# Patient Record
Sex: FEMALE | Race: WHITE | NOT HISPANIC OR LATINO | Employment: OTHER | URBAN - METROPOLITAN AREA
[De-identification: names, ages, dates, MRNs, and addresses within clinical notes are randomized per-mention and may not be internally consistent; named-entity substitution may affect disease eponyms.]

---

## 2020-11-04 ENCOUNTER — HOSPITAL ENCOUNTER (INPATIENT)
Facility: HOSPITAL | Age: 70
LOS: 3 days | Discharge: HOME | End: 2020-11-07
Attending: INTERNAL MEDICINE | Admitting: SURGERY

## 2020-11-04 DIAGNOSIS — K85.10 ACUTE BILIARY PANCREATITIS WITHOUT INFECTION OR NECROSIS: Primary | ICD-10-CM

## 2020-11-04 DIAGNOSIS — Z00.6 EXAMINATION FOR NORMAL COMPARISON OR CONTROL IN CLINICAL RESEARCH: ICD-10-CM

## 2020-11-04 PROBLEM — E66.811 CLASS 1 OBESITY DUE TO EXCESS CALORIES IN ADULT: Chronic | Status: ACTIVE | Noted: 2020-11-04

## 2020-11-04 PROBLEM — I10 HYPERTENSION: Status: ACTIVE | Noted: 2020-11-04

## 2020-11-04 PROBLEM — E66.09 CLASS 1 OBESITY DUE TO EXCESS CALORIES IN ADULT: Chronic | Status: ACTIVE | Noted: 2020-11-04

## 2020-11-04 PROBLEM — K80.43 CALCULUS OF BILE DUCT WITH ACUTE CHOLECYSTITIS AND OBSTRUCTION: Status: ACTIVE | Noted: 2020-11-04

## 2020-11-04 PROBLEM — E78.5 HYPERLIPIDEMIA: Status: ACTIVE | Noted: 2020-11-04

## 2020-11-04 PROBLEM — I73.9 PAD (PERIPHERAL ARTERY DISEASE): Chronic | Status: ACTIVE | Noted: 2020-11-04

## 2020-11-04 PROCEDURE — 25010000002 HYDROMORPHONE PER 4 MG: Performed by: PHYSICIAN ASSISTANT

## 2020-11-04 PROCEDURE — 99223 1ST HOSP IP/OBS HIGH 75: CPT | Performed by: PHYSICIAN ASSISTANT

## 2020-11-04 RX ORDER — SODIUM CHLORIDE 0.9 % (FLUSH) 0.9 %
10 SYRINGE (ML) INJECTION AS NEEDED
Status: DISCONTINUED | OUTPATIENT
Start: 2020-11-04 | End: 2020-11-07 | Stop reason: HOSPADM

## 2020-11-04 RX ORDER — SODIUM CHLORIDE 0.9 % (FLUSH) 0.9 %
10 SYRINGE (ML) INJECTION EVERY 12 HOURS SCHEDULED
Status: DISCONTINUED | OUTPATIENT
Start: 2020-11-05 | End: 2020-11-07 | Stop reason: HOSPADM

## 2020-11-04 RX ORDER — KETOROLAC TROMETHAMINE 15 MG/ML
15 INJECTION, SOLUTION INTRAMUSCULAR; INTRAVENOUS EVERY 6 HOURS PRN
Status: COMPLETED | OUTPATIENT
Start: 2020-11-04 | End: 2020-11-06

## 2020-11-04 RX ORDER — ONDANSETRON 4 MG/1
4 TABLET, FILM COATED ORAL EVERY 6 HOURS PRN
Status: DISCONTINUED | OUTPATIENT
Start: 2020-11-04 | End: 2020-11-07 | Stop reason: HOSPADM

## 2020-11-04 RX ORDER — HYDROMORPHONE HCL 110MG/55ML
1 PATIENT CONTROLLED ANALGESIA SYRINGE INTRAVENOUS
Status: COMPLETED | OUTPATIENT
Start: 2020-11-04 | End: 2020-11-06

## 2020-11-04 RX ORDER — ALUMINA, MAGNESIA, AND SIMETHICONE 2400; 2400; 240 MG/30ML; MG/30ML; MG/30ML
15 SUSPENSION ORAL EVERY 6 HOURS PRN
Status: DISCONTINUED | OUTPATIENT
Start: 2020-11-04 | End: 2020-11-07 | Stop reason: HOSPADM

## 2020-11-04 RX ORDER — SODIUM CHLORIDE 9 MG/ML
100 INJECTION, SOLUTION INTRAVENOUS CONTINUOUS
Status: DISCONTINUED | OUTPATIENT
Start: 2020-11-05 | End: 2020-11-05

## 2020-11-04 RX ORDER — HYDROCODONE BITARTRATE AND ACETAMINOPHEN 10; 325 MG/1; MG/1
1 TABLET ORAL EVERY 4 HOURS PRN
COMMUNITY

## 2020-11-04 RX ORDER — FENOFIBRATE 145 MG/1
160 TABLET, COATED ORAL DAILY
COMMUNITY

## 2020-11-04 RX ORDER — NICOTINE 21 MG/24HR
1 PATCH, TRANSDERMAL 24 HOURS TRANSDERMAL NIGHTLY
Status: DISCONTINUED | OUTPATIENT
Start: 2020-11-04 | End: 2020-11-07 | Stop reason: HOSPADM

## 2020-11-04 RX ORDER — OMEPRAZOLE 20 MG/1
20 CAPSULE, DELAYED RELEASE ORAL 2 TIMES DAILY
COMMUNITY

## 2020-11-04 RX ORDER — ACETAMINOPHEN 325 MG/1
650 TABLET ORAL EVERY 4 HOURS PRN
Status: DISCONTINUED | OUTPATIENT
Start: 2020-11-04 | End: 2020-11-07 | Stop reason: HOSPADM

## 2020-11-04 RX ORDER — CHOLECALCIFEROL (VITAMIN D3) 125 MCG
5 CAPSULE ORAL NIGHTLY PRN
Status: DISCONTINUED | OUTPATIENT
Start: 2020-11-04 | End: 2020-11-07 | Stop reason: HOSPADM

## 2020-11-04 RX ORDER — BISACODYL 10 MG
10 SUPPOSITORY, RECTAL RECTAL DAILY PRN
Status: DISCONTINUED | OUTPATIENT
Start: 2020-11-04 | End: 2020-11-07 | Stop reason: HOSPADM

## 2020-11-04 RX ORDER — ASPIRIN 81 MG/1
81 TABLET, CHEWABLE ORAL DAILY
COMMUNITY

## 2020-11-04 RX ORDER — ATENOLOL 25 MG/1
60 TABLET ORAL DAILY
COMMUNITY

## 2020-11-04 RX ORDER — ACETAMINOPHEN 160 MG/5ML
650 SOLUTION ORAL EVERY 4 HOURS PRN
Status: DISCONTINUED | OUTPATIENT
Start: 2020-11-04 | End: 2020-11-07 | Stop reason: HOSPADM

## 2020-11-04 RX ORDER — ONDANSETRON 2 MG/ML
4 INJECTION INTRAMUSCULAR; INTRAVENOUS EVERY 6 HOURS PRN
Status: DISCONTINUED | OUTPATIENT
Start: 2020-11-04 | End: 2020-11-07 | Stop reason: HOSPADM

## 2020-11-04 RX ORDER — ACETAMINOPHEN 650 MG/1
650 SUPPOSITORY RECTAL EVERY 4 HOURS PRN
Status: DISCONTINUED | OUTPATIENT
Start: 2020-11-04 | End: 2020-11-07 | Stop reason: HOSPADM

## 2020-11-04 RX ADMIN — Medication 1 PATCH: at 23:36

## 2020-11-04 RX ADMIN — HYDROMORPHONE HYDROCHLORIDE 1 MG: 2 INJECTION, SOLUTION INTRAMUSCULAR; INTRAVENOUS; SUBCUTANEOUS at 23:36

## 2020-11-04 RX ADMIN — SODIUM CHLORIDE 100 ML/HR: 9 INJECTION, SOLUTION INTRAVENOUS at 23:36

## 2020-11-05 ENCOUNTER — APPOINTMENT (OUTPATIENT)
Dept: MRI IMAGING | Facility: HOSPITAL | Age: 70
End: 2020-11-05

## 2020-11-05 ENCOUNTER — ANESTHESIA EVENT (OUTPATIENT)
Dept: PERIOP | Facility: HOSPITAL | Age: 70
End: 2020-11-05

## 2020-11-05 ENCOUNTER — INPATIENT HOSPITAL (AMBULATORY)
Dept: URBAN - METROPOLITAN AREA HOSPITAL 84 | Facility: HOSPITAL | Age: 70
End: 2020-11-05

## 2020-11-05 ENCOUNTER — APPOINTMENT (OUTPATIENT)
Dept: GENERAL RADIOLOGY | Facility: HOSPITAL | Age: 70
End: 2020-11-05

## 2020-11-05 ENCOUNTER — APPOINTMENT (OUTPATIENT)
Dept: OTHER | Facility: HOSPITAL | Age: 70
End: 2020-11-05

## 2020-11-05 DIAGNOSIS — K85.10 BILIARY ACUTE PANCREATITIS WITHOUT NECROSIS OR INFECTION: ICD-10-CM

## 2020-11-05 DIAGNOSIS — R93.2 ABNORMAL FINDINGS ON DIAGNOSTIC IMAGING OF LIVER AND BILIARY: ICD-10-CM

## 2020-11-05 DIAGNOSIS — K81.0 ACUTE CHOLECYSTITIS: ICD-10-CM

## 2020-11-05 DIAGNOSIS — R94.5 ABNORMAL RESULTS OF LIVER FUNCTION STUDIES: ICD-10-CM

## 2020-11-05 DIAGNOSIS — R10.11 RIGHT UPPER QUADRANT PAIN: ICD-10-CM

## 2020-11-05 DIAGNOSIS — R10.13 EPIGASTRIC PAIN: ICD-10-CM

## 2020-11-05 LAB
ALBUMIN SERPL-MCNC: 4.1 G/DL (ref 3.5–5.2)
ALBUMIN/GLOB SERPL: 2 G/DL
ALP SERPL-CCNC: 60 U/L (ref 39–117)
ALT SERPL W P-5'-P-CCNC: 82 U/L (ref 1–33)
ANION GAP SERPL CALCULATED.3IONS-SCNC: 7 MMOL/L (ref 5–15)
AST SERPL-CCNC: 124 U/L (ref 1–32)
BASOPHILS # BLD AUTO: 0.1 10*3/MM3 (ref 0–0.2)
BASOPHILS NFR BLD AUTO: 0.5 % (ref 0–1.5)
BILIRUB SERPL-MCNC: 0.6 MG/DL (ref 0–1.2)
BUN SERPL-MCNC: 17 MG/DL (ref 8–23)
BUN/CREAT SERPL: 23.9 (ref 7–25)
CALCIUM SPEC-SCNC: 9.7 MG/DL (ref 8.6–10.5)
CHLORIDE SERPL-SCNC: 105 MMOL/L (ref 98–107)
CHOLEST SERPL-MCNC: 175 MG/DL (ref 0–200)
CO2 SERPL-SCNC: 26 MMOL/L (ref 22–29)
CREAT SERPL-MCNC: 0.71 MG/DL (ref 0.57–1)
DEPRECATED RDW RBC AUTO: 45.9 FL (ref 37–54)
EOSINOPHIL # BLD AUTO: 0 10*3/MM3 (ref 0–0.4)
EOSINOPHIL NFR BLD AUTO: 0.2 % (ref 0.3–6.2)
ERYTHROCYTE [DISTWIDTH] IN BLOOD BY AUTOMATED COUNT: 13.5 % (ref 12.3–15.4)
GFR SERPL CREATININE-BSD FRML MDRD: 81 ML/MIN/1.73
GLOBULIN UR ELPH-MCNC: 2.1 GM/DL
GLUCOSE SERPL-MCNC: 112 MG/DL (ref 65–99)
HCT VFR BLD AUTO: 35.3 % (ref 34–46.6)
HDLC SERPL-MCNC: 37 MG/DL (ref 40–60)
HGB BLD-MCNC: 12.1 G/DL (ref 12–15.9)
LDLC SERPL CALC-MCNC: 111 MG/DL (ref 0–100)
LDLC/HDLC SERPL: 2.9 {RATIO}
LIPASE SERPL-CCNC: 523 U/L (ref 13–60)
LYMPHOCYTES # BLD AUTO: 1.8 10*3/MM3 (ref 0.7–3.1)
LYMPHOCYTES NFR BLD AUTO: 17.5 % (ref 19.6–45.3)
MAGNESIUM SERPL-MCNC: 1.6 MG/DL (ref 1.6–2.4)
MCH RBC QN AUTO: 33 PG (ref 26.6–33)
MCHC RBC AUTO-ENTMCNC: 34.3 G/DL (ref 31.5–35.7)
MCV RBC AUTO: 96.2 FL (ref 79–97)
MONOCYTES # BLD AUTO: 0.7 10*3/MM3 (ref 0.1–0.9)
MONOCYTES NFR BLD AUTO: 6.7 % (ref 5–12)
NEUTROPHILS NFR BLD AUTO: 7.8 10*3/MM3 (ref 1.7–7)
NEUTROPHILS NFR BLD AUTO: 75.1 % (ref 42.7–76)
NRBC BLD AUTO-RTO: 0 /100 WBC (ref 0–0.2)
PHOSPHATE SERPL-MCNC: 3.7 MG/DL (ref 2.5–4.5)
PLATELET # BLD AUTO: 196 10*3/MM3 (ref 140–450)
PMV BLD AUTO: 8.8 FL (ref 6–12)
POTASSIUM SERPL-SCNC: 4.3 MMOL/L (ref 3.5–5.2)
PROT SERPL-MCNC: 6.2 G/DL (ref 6–8.5)
RBC # BLD AUTO: 3.67 10*6/MM3 (ref 3.77–5.28)
SARS-COV-2 RNA PNL SPEC NAA+PROBE: NOT DETECTED
SODIUM SERPL-SCNC: 138 MMOL/L (ref 136–145)
TRIGL SERPL-MCNC: 153 MG/DL (ref 0–150)
VLDLC SERPL-MCNC: 27 MG/DL (ref 5–40)
WBC # BLD AUTO: 10.4 10*3/MM3 (ref 3.4–10.8)

## 2020-11-05 PROCEDURE — 85025 COMPLETE CBC W/AUTO DIFF WBC: CPT | Performed by: PHYSICIAN ASSISTANT

## 2020-11-05 PROCEDURE — 93010 ELECTROCARDIOGRAM REPORT: CPT | Performed by: INTERNAL MEDICINE

## 2020-11-05 PROCEDURE — 83735 ASSAY OF MAGNESIUM: CPT | Performed by: PHYSICIAN ASSISTANT

## 2020-11-05 PROCEDURE — 83690 ASSAY OF LIPASE: CPT | Performed by: PHYSICIAN ASSISTANT

## 2020-11-05 PROCEDURE — 99232 SBSQ HOSP IP/OBS MODERATE 35: CPT | Performed by: INTERNAL MEDICINE

## 2020-11-05 PROCEDURE — 25010000002 LEVOFLOXACIN PER 250 MG: Performed by: NURSE PRACTITIONER

## 2020-11-05 PROCEDURE — 99222 1ST HOSP IP/OBS MODERATE 55: CPT | Performed by: NURSE PRACTITIONER

## 2020-11-05 PROCEDURE — 80061 LIPID PANEL: CPT | Performed by: PHYSICIAN ASSISTANT

## 2020-11-05 PROCEDURE — 87635 SARS-COV-2 COVID-19 AMP PRB: CPT | Performed by: SURGERY

## 2020-11-05 PROCEDURE — 93005 ELECTROCARDIOGRAM TRACING: CPT | Performed by: SURGERY

## 2020-11-05 PROCEDURE — 74181 MRI ABDOMEN W/O CONTRAST: CPT

## 2020-11-05 PROCEDURE — 71046 X-RAY EXAM CHEST 2 VIEWS: CPT

## 2020-11-05 PROCEDURE — 80053 COMPREHEN METABOLIC PANEL: CPT | Performed by: PHYSICIAN ASSISTANT

## 2020-11-05 PROCEDURE — 99222 1ST HOSP IP/OBS MODERATE 55: CPT | Performed by: SURGERY

## 2020-11-05 PROCEDURE — 25010000002 HYDROMORPHONE PER 4 MG: Performed by: PHYSICIAN ASSISTANT

## 2020-11-05 PROCEDURE — 25010000002 KETOROLAC TROMETHAMINE PER 15 MG: Performed by: PHYSICIAN ASSISTANT

## 2020-11-05 PROCEDURE — 84100 ASSAY OF PHOSPHORUS: CPT | Performed by: PHYSICIAN ASSISTANT

## 2020-11-05 RX ORDER — CYCLOBENZAPRINE HCL 10 MG
10 TABLET ORAL 3 TIMES DAILY PRN
COMMUNITY
End: 2020-11-07 | Stop reason: HOSPADM

## 2020-11-05 RX ORDER — LEVOFLOXACIN 5 MG/ML
750 INJECTION, SOLUTION INTRAVENOUS EVERY 24 HOURS
Status: DISCONTINUED | OUTPATIENT
Start: 2020-11-05 | End: 2020-11-07 | Stop reason: HOSPADM

## 2020-11-05 RX ORDER — SIMVASTATIN 40 MG
40 TABLET ORAL NIGHTLY
COMMUNITY

## 2020-11-05 RX ORDER — SODIUM CHLORIDE, SODIUM LACTATE, POTASSIUM CHLORIDE, CALCIUM CHLORIDE 600; 310; 30; 20 MG/100ML; MG/100ML; MG/100ML; MG/100ML
125 INJECTION, SOLUTION INTRAVENOUS CONTINUOUS
Status: DISCONTINUED | OUTPATIENT
Start: 2020-11-05 | End: 2020-11-07 | Stop reason: HOSPADM

## 2020-11-05 RX ORDER — CLINDAMYCIN PHOSPHATE 900 MG/50ML
900 INJECTION, SOLUTION INTRAVENOUS ONCE
Status: CANCELLED | OUTPATIENT
Start: 2020-11-05 | End: 2020-11-05

## 2020-11-05 RX ORDER — PANTOPRAZOLE SODIUM 40 MG/10ML
40 INJECTION, POWDER, LYOPHILIZED, FOR SOLUTION INTRAVENOUS 2 TIMES DAILY
Status: DISCONTINUED | OUTPATIENT
Start: 2020-11-05 | End: 2020-11-07 | Stop reason: HOSPADM

## 2020-11-05 RX ADMIN — HYDROMORPHONE HYDROCHLORIDE 1 MG: 2 INJECTION, SOLUTION INTRAMUSCULAR; INTRAVENOUS; SUBCUTANEOUS at 14:39

## 2020-11-05 RX ADMIN — SODIUM CHLORIDE, POTASSIUM CHLORIDE, SODIUM LACTATE AND CALCIUM CHLORIDE 125 ML/HR: 600; 310; 30; 20 INJECTION, SOLUTION INTRAVENOUS at 20:22

## 2020-11-05 RX ADMIN — HYDROMORPHONE HYDROCHLORIDE 1 MG: 2 INJECTION, SOLUTION INTRAMUSCULAR; INTRAVENOUS; SUBCUTANEOUS at 04:00

## 2020-11-05 RX ADMIN — LEVOFLOXACIN 750 MG: 5 INJECTION, SOLUTION INTRAVENOUS at 17:54

## 2020-11-05 RX ADMIN — KETOROLAC TROMETHAMINE 15 MG: 15 INJECTION, SOLUTION INTRAMUSCULAR; INTRAVENOUS at 02:06

## 2020-11-05 RX ADMIN — KETOROLAC TROMETHAMINE 15 MG: 15 INJECTION, SOLUTION INTRAMUSCULAR; INTRAVENOUS at 09:27

## 2020-11-05 RX ADMIN — PANTOPRAZOLE SODIUM 40 MG: 40 INJECTION, POWDER, FOR SOLUTION INTRAVENOUS at 10:14

## 2020-11-05 RX ADMIN — PANTOPRAZOLE SODIUM 40 MG: 40 INJECTION, POWDER, FOR SOLUTION INTRAVENOUS at 20:21

## 2020-11-05 RX ADMIN — Medication 10 ML: at 02:06

## 2020-11-05 RX ADMIN — Medication 10 ML: at 09:27

## 2020-11-05 RX ADMIN — HYDROMORPHONE HYDROCHLORIDE 1 MG: 2 INJECTION, SOLUTION INTRAMUSCULAR; INTRAVENOUS; SUBCUTANEOUS at 07:15

## 2020-11-05 RX ADMIN — Medication 1 PATCH: at 20:23

## 2020-11-05 RX ADMIN — HYDROMORPHONE HYDROCHLORIDE 1 MG: 2 INJECTION, SOLUTION INTRAMUSCULAR; INTRAVENOUS; SUBCUTANEOUS at 11:35

## 2020-11-05 RX ADMIN — Medication 10 ML: at 20:23

## 2020-11-05 RX ADMIN — HYDROMORPHONE HYDROCHLORIDE 1 MG: 2 INJECTION, SOLUTION INTRAMUSCULAR; INTRAVENOUS; SUBCUTANEOUS at 21:06

## 2020-11-05 RX ADMIN — KETOROLAC TROMETHAMINE 15 MG: 15 INJECTION, SOLUTION INTRAMUSCULAR; INTRAVENOUS at 17:54

## 2020-11-05 RX ADMIN — SODIUM CHLORIDE, POTASSIUM CHLORIDE, SODIUM LACTATE AND CALCIUM CHLORIDE 125 ML/HR: 600; 310; 30; 20 INJECTION, SOLUTION INTRAVENOUS at 10:14

## 2020-11-05 RX ADMIN — METRONIDAZOLE 500 MG: 500 INJECTION, SOLUTION INTRAVENOUS at 20:22

## 2020-11-05 NOTE — PROGRESS NOTES
[facility] Medicine Services Daily Progress Note      Hospitalist Team  LOS 1 days      Patient Care Team:  Christian Salvador MD as PCP - General (Family Medicine)    Patient Location: 4110/1      Subjective   Subjective     Chief Complaint / Subjective  No chief complaint on file.        Brief Synopsis of Hospital Course/HPI  Ms. Chang is a 70 y.o.  presents to [facility] complaining of constant, abdominal pain that woke the patient up on the AM of 11/4/2020 at 0545 without any associated with nausea and decreased appetite. Nothing makes it better and movement makes it worse. Patient reports that the evening before she ate fast food which is abnormal for her. She denies ever having any pain like this before. The pain is diffusely located bit more concentrated in the RUQ and epigastric region. Patient denies any fever, chills, chest pain, SOA, cough, sputum, peripheral edema, dysuria, or recent travel/sick contacts/hospitalizations.      Documentation, labs, and imaging from [facility] ED reviewed and patient noted to have normal renal function, elevated  and ALT 99, Tbili 1.7, elevated WBC 13, significantly elevated lipase 1215 and amylase 814. On CT A/P W patient was noted to have distended and thickened wall gall bladder that is consistent with acute cholecystitis, peripancreatic fat stranding consistent with acute pancreatitis, and biliary ductal dilation and mild distention of distal pancreatic duct consistent with choledocholithiasis.       Date:11/5/20: abd pain          Review of Systems   Constitution: Negative.   HENT: Negative.    Eyes: Negative.    Cardiovascular: Negative.    Respiratory: Negative.    Endocrine: Negative.    Hematologic/Lymphatic: Negative.    Skin: Negative.    Musculoskeletal: Negative.    Gastrointestinal: Positive for abdominal pain.   Genitourinary: Negative.    Neurological: Negative.    Psychiatric/Behavioral: Negative.     "  Allergic/Immunologic: Negative.    All other systems reviewed and are negative.        Objective   Objective      Vital Signs  Temp:  [97.7 °F (36.5 °C)-98.5 °F (36.9 °C)] 98.5 °F (36.9 °C)  Heart Rate:  [71-88] 87  Resp:  [17-20] 18  BP: (115-145)/(53-69) 145/63  Oxygen Therapy  SpO2: 97 %  Pulse Oximetry Type: Intermittent  Device (Oxygen Therapy): room air  Flowsheet Rows      First Filed Value   Admission Height  162.6 cm (64\") Documented at 11/04/2020 2204   Admission Weight  90 kg (198 lb 6.6 oz) Documented at 11/04/2020 2204        Intake & Output (last 3 days)       11/02 0701 - 11/03 0700 11/03 0701 - 11/04 0700 11/04 0701 - 11/05 0700 11/05 0701 - 11/06 0700    P.O.   240 0    I.V. (mL/kg)    1448 (16.1)    Total Intake(mL/kg)   240 (2.7) 1448 (16.1)    Urine (mL/kg/hr)   600 300 (0.3)    Total Output   600 300    Net   -360 +1148                Lines, Drains & Airways    Active LDAs     Name:   Placement date:   Placement time:   Site:   Days:    Peripheral IV 11/05/20 0030 Anterior;Left;Proximal Forearm   11/05/20 0030    Forearm   less than 1                  Physical Exam:    Physical Exam  Vitals signs and nursing note reviewed.   Constitutional:       General: She is not in acute distress.     Appearance: Normal appearance. She is well-developed. She is not toxic-appearing.   HENT:      Head: Normocephalic and atraumatic.      Nose: Nose normal. No congestion or rhinorrhea.      Mouth/Throat:      Mouth: Mucous membranes are moist.      Pharynx: No oropharyngeal exudate.   Eyes:      General:         Right eye: No discharge.      Extraocular Movements: Extraocular movements intact.      Conjunctiva/sclera: Conjunctivae normal.      Pupils: Pupils are equal, round, and reactive to light.   Neck:      Musculoskeletal: Normal range of motion and neck supple. No neck rigidity or muscular tenderness.      Thyroid: No thyromegaly.      Vascular: No carotid bruit or JVD.      Trachea: No tracheal " deviation.   Cardiovascular:      Rate and Rhythm: Normal rate and regular rhythm.      Pulses: Normal pulses.      Heart sounds: Normal heart sounds. No murmur. No friction rub.   Pulmonary:      Effort: Pulmonary effort is normal.      Breath sounds: Normal breath sounds.   Abdominal:      General: Bowel sounds are normal. There is no distension.      Palpations: Abdomen is soft. There is no mass.      Tenderness: There is abdominal tenderness. There is no guarding or rebound.      Hernia: No hernia is present.   Musculoskeletal: Normal range of motion.         General: No swelling, tenderness, deformity or signs of injury.      Right lower leg: No edema.      Left lower leg: No edema.   Lymphadenopathy:      Cervical: No cervical adenopathy.   Skin:     General: Skin is warm and dry.      Coloration: Skin is not jaundiced or pale.      Findings: No bruising, erythema or rash.   Neurological:      General: No focal deficit present.      Mental Status: She is alert and oriented to person, place, and time. Mental status is at baseline.      Cranial Nerves: No cranial nerve deficit.      Sensory: No sensory deficit.      Motor: No weakness or abnormal muscle tone.      Coordination: Coordination normal.   Psychiatric:         Mood and Affect: Mood normal.         Behavior: Behavior normal.         Thought Content: Thought content normal.         Judgment: Judgment normal.               Procedures:    Procedure(s):  CHOLECYSTECTOMY LAPAROSCOPIC INTRAOPERATIVE CHOLANGIOGRAM          Results Review:     I reviewed the patient's new clinical results.      Lab Results (last 24 hours)     Procedure Component Value Units Date/Time    COVID PRE-OP / PRE-PROCEDURE SCREENING ORDER (NO ISOLATION) - Swab, Nasopharynx [[id number]]  (Normal) Collected: 11/05/20 1400    Specimen: Swab from Nasopharynx Updated: 11/05/20 1533    Narrative:      The following orders were created for panel order COVID PRE-OP / PRE-PROCEDURE SCREENING  ORDER (NO ISOLATION) - Swab, Nasopharynx.  Procedure                               Abnormality         Status                     ---------                               -----------         ------                     COVID-19,CEPHEID,COR/MERCEDEZ...[[id number]]  Normal              Final result                 Please view results for these tests on the individual orders.    COVID-19,CEPHEID,COR/MERCEDEZ/PAD IN-HOUSE(OR EMERGENT/ADD-ON),NP SWAB IN TRANSPORT MEDIA 3-4 HR TAT - Swab, Nasopharynx [[id number]]  (Normal) Collected: 11/05/20 1400    Specimen: Swab from Nasopharynx Updated: 11/05/20 1533     COVID19 Not Detected    Narrative:      Fact sheet for providers: https://www.fda.gov/media/309850/download     Fact sheet for patients: https://www.fda.gov/media/700672/download    Lipase [[id number]]  (Abnormal) Collected: 11/05/20 0227    Specimen: Blood Updated: 11/05/20 0348     Lipase 523 U/L     Lipid Panel [[id number]]  (Abnormal) Collected: 11/05/20 0227    Specimen: Blood Updated: 11/05/20 0341     Total Cholesterol 175 mg/dL      Triglycerides 153 mg/dL      HDL Cholesterol 37 mg/dL      LDL Cholesterol  111 mg/dL      VLDL Cholesterol 27 mg/dL      LDL/HDL Ratio 2.90    Narrative:      Cholesterol Reference Ranges  (U.S. Department of Health and Human Services ATP III Classifications)    Desirable          <200 mg/dL  Borderline High    200-239 mg/dL  High Risk          >240 mg/dL      Triglyceride Reference Ranges  (U.S. Department of Health and Human Services ATP III Classifications)    Normal           <150 mg/dL  Borderline High  150-199 mg/dL  High             200-499 mg/dL  Very High        >500 mg/dL    HDL Reference Ranges  (U.S. Department of Health and Human Services ATP III Classifcations)    Low     <40 mg/dl (major risk factor for CHD)  High    >60 mg/dl ('negative' risk factor for CHD)        LDL Reference Ranges  (U.S. Department of Health and Human Services ATP III Classifcations)    Optimal          <100  mg/dL  Near Optimal     100-129 mg/dL  Borderline High  130-159 mg/dL  High             160-189 mg/dL  Very High        >189 mg/dL    Comprehensive Metabolic Panel [[id number]]  (Abnormal) Collected: 11/05/20 0227    Specimen: Blood Updated: 11/05/20 0341     Glucose 112 mg/dL      BUN 17 mg/dL      Creatinine 0.71 mg/dL      Sodium 138 mmol/L      Potassium 4.3 mmol/L      Chloride 105 mmol/L      CO2 26.0 mmol/L      Calcium 9.7 mg/dL      Total Protein 6.2 g/dL      Albumin 4.10 g/dL      ALT (SGPT) 82 U/L      AST (SGOT) 124 U/L      Alkaline Phosphatase 60 U/L      Total Bilirubin 0.6 mg/dL      eGFR Non African Amer 81 mL/min/1.73      Globulin 2.1 gm/dL      A/G Ratio 2.0 g/dL      BUN/Creatinine Ratio 23.9     Anion Gap 7.0 mmol/L     Narrative:      GFR Normal >60  Chronic Kidney Disease <60  Kidney Failure <15      Magnesium [[id number]]  (Normal) Collected: 11/05/20 0227    Specimen: Blood Updated: 11/05/20 0341     Magnesium 1.6 mg/dL     Phosphorus [[id number]]  (Normal) Collected: 11/05/20 0227    Specimen: Blood Updated: 11/05/20 0341     Phosphorus 3.7 mg/dL     CBC & Differential [[id number]]  (Abnormal) Collected: 11/05/20 0227    Specimen: Blood Updated: 11/05/20 0321    Narrative:      The following orders were created for panel order CBC & Differential.  Procedure                               Abnormality         Status                     ---------                               -----------         ------                     CBC Auto Differential[[id number]]        Abnormal            Final result                 Please view results for these tests on the individual orders.    CBC Auto Differential [[id number]]  (Abnormal) Collected: 11/05/20 0227    Specimen: Blood Updated: 11/05/20 0321     WBC 10.40 10*3/mm3      RBC 3.67 10*6/mm3      Hemoglobin 12.1 g/dL      Hematocrit 35.3 %      MCV 96.2 fL      MCH 33.0 pg      MCHC 34.3 g/dL      RDW 13.5 %      RDW-SD 45.9 fl      MPV 8.8 fL      Platelets  196 10*3/mm3      Neutrophil % 75.1 %      Lymphocyte % 17.5 %      Monocyte % 6.7 %      Eosinophil % 0.2 %      Basophil % 0.5 %      Neutrophils, Absolute 7.80 10*3/mm3      Lymphocytes, Absolute 1.80 10*3/mm3      Monocytes, Absolute 0.70 10*3/mm3      Eosinophils, Absolute 0.00 10*3/mm3      Basophils, Absolute 0.10 10*3/mm3      nRBC 0.0 /100 WBC         No results found for: HGBA1C            Lab Results   Component Value Date    LIPASE 523 (H) 11/05/2020     Lab Results   Component Value Date    CHOL 175 11/05/2020    TRIG 153 (H) 11/05/2020    HDL 37 (L) 11/05/2020     (H) 11/05/2020       No results found for: INTRAOP, PREDX, FINALDX, COMDX    Microbiology Results (last 10 days)     Procedure Component Value - Date/Time    COVID PRE-OP / PRE-PROCEDURE SCREENING ORDER (NO ISOLATION) - Swab, Nasopharynx [[id number]]  (Normal) Collected: 11/05/20 1400    Lab Status: Final result Specimen: Swab from Nasopharynx Updated: 11/05/20 1533    Narrative:      The following orders were created for panel order COVID PRE-OP / PRE-PROCEDURE SCREENING ORDER (NO ISOLATION) - Swab, Nasopharynx.  Procedure                               Abnormality         Status                     ---------                               -----------         ------                     COVID-19,CEPHEID,COR/MERCEDEZ...[[id number]]  Normal              Final result                 Please view results for these tests on the individual orders.    COVID-19,CEPHEID,COR/MERCEDEZ/PAD IN-HOUSE(OR EMERGENT/ADD-ON),NP SWAB IN TRANSPORT MEDIA 3-4 HR TAT - Swab, Nasopharynx [[id number]]  (Normal) Collected: 11/05/20 1400    Lab Status: Final result Specimen: Swab from Nasopharynx Updated: 11/05/20 1533     COVID19 Not Detected    Narrative:      Fact sheet for providers: https://www.fda.gov/media/451878/download     Fact sheet for patients: https://www.fda.gov/media/862570/download          ECG/EMG Results (most recent)     Procedure Component Value Units  Date/Time    ECG 12 Lead [[id number]] Collected: 11/05/20 1546     Updated: 11/05/20 1548     QT Interval 402 ms     Narrative:      HEART RATE= 86  bpm  RR Interval= 700  ms  LA Interval= 129  ms  P Horizontal Axis= -20  deg  P Front Axis= 59  deg  QRSD Interval= 93  ms  QT Interval= 402  ms  QRS Axis= 80  deg  T Wave Axis= 42  deg  - BORDERLINE ECG -  Sinus rhythm  Probable left atrial enlargement  Low voltage, precordial leads  Electronically Signed By:   Date and Time of Study: 2020-11-05 15:46:40                    Mri Abdomen Wo Contrast Mrcp    Result Date: 11/5/2020   1. Acute calculus cholecystitis. 2. Fusiform dilation of the common bile duct, and mild dilation of intrahepatic bile ducts. No high-grade biliary stricture or choledocholithiasis is seen. 3. Normal appearance of the pancreatic duct. 4. Inflammatory changes centered within the pancreatic head and the second-third duodenal segments. FINDINGS suggest pancreatitis/duodenitis. These findings could be secondary to gallbladder inflammation, as well. 4. Hepatomegaly with diffuse hepatic steatosis. Presumed left hepatic cyst. 5. Lower lumbar spinal fusion.   Electronically Signed By-Dr. Tavia Domingo MD On:11/5/2020 12:02 PM This report was finalized on [id number] by Dr. Tavia Domingo MD.          Xrays, labs reviewed personally by physician.    Medication Review:   I have reviewed the patient's current medication list      Scheduled Meds  levoFLOXacin, 750 mg, Intravenous, Q24H  metroNIDAZOLE, 500 mg, Intravenous, Q8H  nicotine, 1 patch, Transdermal, Nightly  pantoprazole, 40 mg, Intravenous, BID  sodium chloride, 10 mL, Intravenous, Q12H        Meds Infusions  lactated ringers, 125 mL/hr, Last Rate: 125 mL/hr (11/05/20 1014)        Meds PRN  •  acetaminophen **OR** acetaminophen **OR** acetaminophen  •  aluminum-magnesium hydroxide-simethicone  •  bisacodyl  •  HYDROmorphone  •  ketorolac  •  magnesium hydroxide  •  melatonin  •  ondansetron  **OR** ondansetron  •  sodium chloride        Assessment/Plan   Assessment/Plan     Active Hospital Problems    Diagnosis  POA   • **Calculus of bile duct with acute cholecystitis and obstruction [K80.43]  Yes   • Acute biliary pancreatitis without infection or necrosis [K85.10]  Yes   • PAD (peripheral artery disease) (CMS/HCC) [I73.9]  Unknown   • Hyperlipidemia [E78.5]  Yes   • Hypertension [I10]  Yes   • Class 1 obesity due to excess calories in adult [E66.09]  Unknown   • Tobacco dependence syndrome [F17.200]  Yes      Resolved Hospital Problems   No resolved problems to display.       MEDICAL DECISION MAKING COMPLEXITY BY PROBLEM:   Acute Choledocholithiasis with acute cholecystitis and acute pancreatitis:  - Likely related to age, obesity and poor diet  - IVFs, NPO at midnight, and pain management ordered  - Surgery consulted by transferring ED >We will plan for MRCP to evaluate for choledocholithiasis.  If choledocholithiasis we will have GI see for ERCP.  If there is no evidence of ongoing Rylee toco lithiasis we will wait for her pancreatitis to resolve and will offer her cholecystectomy as there was some suggestion of gallstones on the CT scan.  We will check in daily to see when this can be done possibly tomorrow but if her pain is not better he may have to wait until the weekend.  - Also consulted GI for possible ERCP     HTN: patient is unsure of her home medications and will need to verify with pharmacy in the AM. Holding medications and will monitor BP trends; PRN medications will be ordered if she is uncontrolled     HLD: Same as above. Lipid panel ordered with AM labs.      PAD s/p angioplasty and stent in Right leg: Patient reports that she has complete occlusion in left leg and is suppose to have a peripheral bypass surgery of both legs but she has not scheduled it. Smoking cessation discussed and encouraged. Holding aspirin for possible surgeries.      Obesity: BMI 34.06; lifestyle modifications  discussed and encouraged.     Tobacco use: smoking cessation discussed and encouraged. PRN nicotine patch ordered.      VTE Prophylaxis -   Mechanical Order History:      Ordered        11/04/20 2323  Place Sequential Compression Device  Once         11/04/20 2323  Maintain Sequential Compression Device  Continuous         Signed and Held  SCD (Sequential Compression Device) - Place on Patient in Pre-Op  Once                 Pharmalogical Order History:     None            Code Status -   Code Status and Medical Interventions:   Ordered at: 11/04/20 2323     Code Status:    CPR     Medical Interventions (Level of Support Prior to Arrest):    Full       This patient has been examined wearing appropriate Personal Protective Equipment and discussed with surgery. 11/05/20        Discharge Planning  home          Electronically signed by Zane Burrell MD, 11/05/20, 18:01 EST.  [facility] Hospitalist Team

## 2020-11-05 NOTE — H&P
[facility] Medicine Services      Patient Name: Radha Chang  : 1950  MRN: [id number]  Primary Care Physician: Christian Salvador MD  Date of admission: 2020    Patient Care Team:  Christian Salvador MD as PCP - General (Family Medicine)          Subjective   History Present Illness     Chief Complaint: No chief complaint on file.    Transfer from [facility] ED for abdominal pain    Ms. Chang is a 70 y.o.  presents to [facility] complaining of constant, abdominal pain that woke the patient up on the AM of 2020 at 0545 without any associated with nausea and decreased appetite. Nothing makes it better and movement makes it worse. Patient reports that the evening before she ate fast food which is abnormal for her. She denies ever having any pain like this before. The pain is diffusely located bit more concentrated in the RUQ and epigastric region. Patient denies any fever, chills, chest pain, SOA, cough, sputum, peripheral edema, dysuria, or recent travel/sick contacts/hospitalizations.     Documentation, labs, and imaging from [facility] ED reviewed and patient noted to have normal renal function, elevated  and ALT 99, Tbili 1.7, elevated WBC 13, significantly elevated lipase 1215 and amylase 814. On CT A/P W patient was noted to have distended and thickened wall gall bladder that is consistent with acute cholecystitis, peripancreatic fat stranding consistent with acute pancreatitis, and biliary ductal dilation and mild distention of distal pancreatic duct consistent with choledocholithiasis.          History of Present Illness    Review of Systems   Constitution: Positive for decreased appetite. Negative for fever.   Cardiovascular: Negative for chest pain.   Respiratory: Negative for cough, shortness of breath and sputum production.    Gastrointestinal: Positive for bloating, abdominal pain and nausea. Negative for change in bowel habit,  hematemesis, hematochezia, melena and vomiting.   All other systems reviewed and are negative.          Personal History     Past Medical History:   Past Medical History:   Diagnosis Date   • Arthritis    • Coronary artery disease    • GERD (gastroesophageal reflux disease)    • Hyperlipidemia    • Hypertension    • PAD (peripheral artery disease) (CMS/[facility])        Surgical History:      Past Surgical History:   Procedure Laterality Date   • ANGIOPLASTY Right     right leg stent placed   • APPENDECTOMY     • BACK SURGERY     • CARDIAC CATHETERIZATION     • HYSTERECTOMY     • ROTATOR CUFF REPAIR Right            Family History: family history includes Cancer in her mother; Pancreatitis in her sister. Otherwise pertinent FHx was reviewed and unremarkable.     Social History:  reports that she has been smoking. She has been smoking about 1.00 pack per day. She has never used smokeless tobacco. She reports previous alcohol use. She reports that she does not use drugs.      Medications:  Prior to Admission medications    Medication Sig Start Date End Date Taking? Authorizing Provider   aspirin 81 MG chewable tablet Chew 81 mg Daily.   Yes Dayami Ortega MD   atenolol (TENORMIN) 25 MG tablet Take 20 mg by mouth Daily.   Yes Dayami Ortega MD   fenofibrate (TRICOR) 145 MG tablet Take 145 mg by mouth Daily.   Yes Dayami Ortega MD   HYDROcodone-acetaminophen (NORCO)  MG per tablet Take 1 tablet by mouth Every 4 (Four) Hours As Needed for Moderate Pain .   Yes Dayami Ortega MD   omeprazole (priLOSEC) 20 MG capsule Take 20 mg by mouth 2 (two) times a day.   Yes Dayami Ortega MD       Allergies:    Allergies   Allergen Reactions   • Niacin Itching   • Kefzol [Cefazolin] Itching       Objective   Objective     Vital Signs  Temp:  [97.8 °F (36.6 °C)] 97.8 °F (36.6 °C)  Heart Rate:  [78] 78  Resp:  [18] 18  BP: (123)/(53) 123/53  SpO2:  [99 %] 99 %  on   ;   Device (Oxygen Therapy): room  air  Body mass index is 34.06 kg/m².    Physical Exam  Vitals signs reviewed.   Constitutional:       General: She is not in acute distress.     Appearance: Normal appearance. She is obese. She is not ill-appearing, toxic-appearing or diaphoretic.      Comments: Appears older than stated age; appears uncomfortable   HENT:      Head: Normocephalic and atraumatic.      Right Ear: External ear normal.      Left Ear: External ear normal.      Nose: Nose normal.      Mouth/Throat:      Mouth: Mucous membranes are dry.   Eyes:      General: No scleral icterus.        Right eye: No discharge.         Left eye: No discharge.      Extraocular Movements: Extraocular movements intact.      Conjunctiva/sclera: Conjunctivae normal.   Neck:      Musculoskeletal: Neck supple.   Cardiovascular:      Rate and Rhythm: Normal rate and regular rhythm.      Pulses: Normal pulses.      Heart sounds: Normal heart sounds. No murmur. No gallop.    Pulmonary:      Effort: Pulmonary effort is normal. No respiratory distress.      Breath sounds: Normal breath sounds. No wheezing or rales.   Abdominal:      General: There is distension.      Palpations: Abdomen is soft.      Tenderness: There is abdominal tenderness. There is guarding.      Comments: Hypoactive bowel sounds   Musculoskeletal: Normal range of motion.   Skin:     General: Skin is dry.   Neurological:      General: No focal deficit present.      Mental Status: She is alert and oriented to person, place, and time.   Psychiatric:         Mood and Affect: Mood normal.         Behavior: Behavior normal.         Thought Content: Thought content normal.         Judgment: Judgment normal.         Results Review:  I have personally reviewed most recent cardiac tracings, lab results, microbiology results, pathology results and radiology images and interpretations and agree with findings, most notably: .              Invalid input(s):  ALKPHOS  CrCl cannot be calculated (No successful lab  value found.).  Brief Urine Lab Results     None          Microbiology Results (last 10 days)     ** No results found for the last 240 hours. **          ECG/EMG Results (most recent)     None                    No radiology results for the last 7 days      CrCl cannot be calculated (No successful lab value found.).    Assessment/Plan   Assessment/Plan       Active Hospital Problems    Diagnosis  POA   • **Calculus of bile duct with acute cholecystitis and obstruction [K80.43]  Yes     Priority: High   • Acute biliary pancreatitis without infection or necrosis [K85.10]  Yes     Priority: High   • PAD (peripheral artery disease) (CMS/HCC) [I73.9]  Unknown   • Hyperlipidemia [E78.5]  Yes   • Hypertension [I10]  Yes   • Class 1 obesity due to excess calories in adult [E66.09]  Unknown   • Tobacco dependence syndrome [F17.200]  Yes      Resolved Hospital Problems   No resolved problems to display.       Acute Choledocholithiasis with acute cholecystitis and acute pancreatitis:  - Likely related to age, obesity and poor diet  - IVFs, NPO at midnight, and pain management ordered  - Surgery consulted by transferring ED and continued here  - Also consulted GI for possible ERCP    HTN: patient is unsure of her home medications and will need to verify with pharmacy in the AM. Holding medications and will monitor BP trends; PRN medications will be ordered if she is uncontrolled    HLD: Same as above. Lipid panel ordered with AM labs.     PAD s/p angioplasty and stent in Right leg: Patient reports that she has complete occlusion in left leg and is suppose to have a peripheral bypass surgery of both legs but she has not scheduled it. Smoking cessation discussed and encouraged. Holding aspirin for possible surgeries.     Obesity: BMI 34.06; lifestyle modifications discussed and encouraged.    Tobacco use: smoking cessation discussed and encouraged. PRN nicotine patch ordered.             VTE Prophylaxis -   Mechanical Order  History:     SCDs      Pharmalogical Order History:     None          CODE STATUS:    Code Status and Medical Interventions:   Ordered at: 11/04/20 2323     Code Status:    CPR     Medical Interventions (Level of Support Prior to Arrest):    Full       This patient has been examined wearing appropriate Personal Protective Equipment and discussed with accepting provider. 11/04/20      I discussed the patient's findings and my recommendations with patient.      Signature:Electronically signed by Era Steiner PA-C, 11/05/20, 12:03 AM PEDRO.      [facility] Hospitalist Team

## 2020-11-05 NOTE — PLAN OF CARE
Goal Outcome Evaluation:  Plan of Care Reviewed With: patient      Patient is very painful and requires IV dilaudid and toradol which patient states has helped her pain some. Patient to be seen by GI and general surgeon today for ERCP and cholecystectomy

## 2020-11-05 NOTE — CONSULTS
Inpatient General Surgery Consult  Consult performed by: Dipesh Rodrigues MD  Consult ordered by: Era Steiner PA-C  Reason for consult: pancreatitis, possible cholecystitis          General Surgery Consult Note      Subjective     70-year-old lady who was transferred from [facility] with a chief complaint of 24 hours of sharp progressive constant central abdominal pain.  She never had this before.  Because of the progression she decided to seek medical attention.  She denies any fevers or chills nausea vomiting or change in her bowel function.  She denies any recent pain with oral intake.  On arrival at the outside hospital she was found to have an elevated lipase bilirubin of 1.7 and CT scan showing some dilated intra and extrahepatic bile ducts dilated gallbladder with possible her pericholecystic fluid and evidence of pancreatitis she was transferred for management of her pancreatitis I been asked to see her for considerations for cholecystectomy if this represents gallstone pancreatitis.    History  Past Medical History:   Diagnosis Date   • Arthritis    • Coronary artery disease    • GERD (gastroesophageal reflux disease)    • Hyperlipidemia    • Hypertension    • PAD (peripheral artery disease) (CMS/HCC)      Past Surgical History:   Procedure Laterality Date   • ANGIOPLASTY Right     right leg stent placed   • APPENDECTOMY     • BACK SURGERY     • CARDIAC CATHETERIZATION     • HYSTERECTOMY     • ROTATOR CUFF REPAIR Right      Family History   Problem Relation Age of Onset   • Cancer Mother         pancreatic   • Pancreatitis Sister      Social History     Tobacco Use   • Smoking status: Current Every Day Smoker     Packs/day: 1.00   • Smokeless tobacco: Never Used   Substance Use Topics   • Alcohol use: Not Currently   • Drug use: Never     Medications Prior to Admission   Medication Sig Dispense Refill Last Dose   • aspirin 81 MG chewable tablet Chew 81 mg Daily.      • atenolol  (TENORMIN) 25 MG tablet Take 60 mg by mouth Daily.      • cyclobenzaprine (FLEXERIL) 10 MG tablet Take 10 mg by mouth 3 (Three) Times a Day As Needed for Muscle Spasms.      • fenofibrate (TRICOR) 145 MG tablet Take 160 mg by mouth Daily.      • HYDROcodone-acetaminophen (NORCO)  MG per tablet Take 1 tablet by mouth Every 4 (Four) Hours As Needed for Moderate Pain .      • omeprazole (priLOSEC) 20 MG capsule Take 20 mg by mouth 2 (two) times a day.      • simvastatin (ZOCOR) 40 MG tablet Take 40 mg by mouth Every Night.        Allergies:  Niacin and Kefzol [cefazolin]    Review of Systems   Constitutional: Negative for chills and fever.   HENT: Negative for sore throat and trouble swallowing.    Eyes: Negative for blurred vision and double vision.   Respiratory: Negative for cough and shortness of breath.    Cardiovascular: Positive for leg swelling. Negative for chest pain.   Gastrointestinal: Positive for abdominal pain and constipation. Negative for abdominal distention, blood in stool, diarrhea, nausea and vomiting.   Genitourinary: Negative for dysuria and hematuria.   Skin: Negative for rash and bruise.   Neurological: Negative for dizziness and confusion.   Psychiatric/Behavioral: Negative for agitation and depressed mood.        Objective     Vital Signs  Temp:  [97.7 °F (36.5 °C)-98.4 °F (36.9 °C)] 98.4 °F (36.9 °C)  Heart Rate:  [71-83] 78  Resp:  [18-20] 20  BP: (115-136)/(53-64) 127/53    Physical Exam  Vitals signs reviewed.   Constitutional:       Appearance: Normal appearance. She is well-developed.   HENT:      Head: Normocephalic and atraumatic.   Eyes:      General: No scleral icterus.     Conjunctiva/sclera: Conjunctivae normal.   Neck:      Musculoskeletal: No muscular tenderness.      Trachea: No tracheal deviation.   Cardiovascular:      Rate and Rhythm: Normal rate.      Pulses: Normal pulses.   Pulmonary:      Effort: Pulmonary effort is normal. No respiratory distress.   Abdominal:       General: There is no distension.      Palpations: Abdomen is soft.      Comments: Obese with some focal tenderness to palpation in the epigastrium.  No evidence of peritonitis   Musculoskeletal:         General: No swelling or tenderness.   Lymphadenopathy:      Cervical: No cervical adenopathy.   Skin:     General: Skin is warm and dry.   Neurological:      General: No focal deficit present.      Mental Status: She is alert. Mental status is at baseline.   Psychiatric:         Mood and Affect: Mood normal.         Behavior: Behavior normal.         Results Review:  Lab Results (last 24 hours)     Procedure Component Value Units Date/Time    Lipase [[id number]]  (Abnormal) Collected: 11/05/20 0227    Specimen: Blood Updated: 11/05/20 0348     Lipase 523 U/L     Lipid Panel [[id number]]  (Abnormal) Collected: 11/05/20 0227    Specimen: Blood Updated: 11/05/20 0341     Total Cholesterol 175 mg/dL      Triglycerides 153 mg/dL      HDL Cholesterol 37 mg/dL      LDL Cholesterol  111 mg/dL      VLDL Cholesterol 27 mg/dL      LDL/HDL Ratio 2.90    Narrative:      Cholesterol Reference Ranges  (U.S. Department of Health and Human Services ATP III Classifications)    Desirable          <200 mg/dL  Borderline High    200-239 mg/dL  High Risk          >240 mg/dL      Triglyceride Reference Ranges  (U.S. Department of Health and Human Services ATP III Classifications)    Normal           <150 mg/dL  Borderline High  150-199 mg/dL  High             200-499 mg/dL  Very High        >500 mg/dL    HDL Reference Ranges  (U.S. Department of Health and Human Services ATP III Classifcations)    Low     <40 mg/dl (major risk factor for CHD)  High    >60 mg/dl ('negative' risk factor for CHD)        LDL Reference Ranges  (U.S. Department of Health and Human Services ATP III Classifcations)    Optimal          <100 mg/dL  Near Optimal     100-129 mg/dL  Borderline High  130-159 mg/dL  High             160-189 mg/dL  Very High        >189  mg/dL    Comprehensive Metabolic Panel [[id number]]  (Abnormal) Collected: 11/05/20 0227    Specimen: Blood Updated: 11/05/20 0341     Glucose 112 mg/dL      BUN 17 mg/dL      Creatinine 0.71 mg/dL      Sodium 138 mmol/L      Potassium 4.3 mmol/L      Chloride 105 mmol/L      CO2 26.0 mmol/L      Calcium 9.7 mg/dL      Total Protein 6.2 g/dL      Albumin 4.10 g/dL      ALT (SGPT) 82 U/L      AST (SGOT) 124 U/L      Alkaline Phosphatase 60 U/L      Total Bilirubin 0.6 mg/dL      eGFR Non African Amer 81 mL/min/1.73      Globulin 2.1 gm/dL      A/G Ratio 2.0 g/dL      BUN/Creatinine Ratio 23.9     Anion Gap 7.0 mmol/L     Narrative:      GFR Normal >60  Chronic Kidney Disease <60  Kidney Failure <15      Magnesium [[id number]]  (Normal) Collected: 11/05/20 0227    Specimen: Blood Updated: 11/05/20 0341     Magnesium 1.6 mg/dL     Phosphorus [[id number]]  (Normal) Collected: 11/05/20 0227    Specimen: Blood Updated: 11/05/20 0341     Phosphorus 3.7 mg/dL     CBC & Differential [[id number]]  (Abnormal) Collected: 11/05/20 0227    Specimen: Blood Updated: 11/05/20 0321    Narrative:      The following orders were created for panel order CBC & Differential.  Procedure                               Abnormality         Status                     ---------                               -----------         ------                     CBC Auto Differential[[id number]]        Abnormal            Final result                 Please view results for these tests on the individual orders.    CBC Auto Differential [[id number]]  (Abnormal) Collected: 11/05/20 0227    Specimen: Blood Updated: 11/05/20 0321     WBC 10.40 10*3/mm3      RBC 3.67 10*6/mm3      Hemoglobin 12.1 g/dL      Hematocrit 35.3 %      MCV 96.2 fL      MCH 33.0 pg      MCHC 34.3 g/dL      RDW 13.5 %      RDW-SD 45.9 fl      MPV 8.8 fL      Platelets 196 10*3/mm3      Neutrophil % 75.1 %      Lymphocyte % 17.5 %      Monocyte % 6.7 %      Eosinophil % 0.2 %      Basophil %  0.5 %      Neutrophils, Absolute 7.80 10*3/mm3      Lymphocytes, Absolute 1.80 10*3/mm3      Monocytes, Absolute 0.70 10*3/mm3      Eosinophils, Absolute 0.00 10*3/mm3      Basophils, Absolute 0.10 10*3/mm3      nRBC 0.0 /100 WBC         Imaging Results (Last 24 Hours)     Procedure Component Value Units Date/Time    CT Outside Films [[id number]] Resulted: 11/05/20 1137     Updated: 11/05/20 1137    Narrative:      This procedure was auto-finalized with no dictation required.    MRI abdomen wo contrast mrcp [[id number]] Resulted: 11/05/20 1157     Updated: 11/05/20 1115          I reviewed the patient's other test results and agree with the interpretation  I reviewed the patient's new imaging results and agree with the interpretation.    Assessment/Plan     Principal Problem:    Calculus of bile duct with acute cholecystitis and obstruction  Active Problems:    Hyperlipidemia    Hypertension    Tobacco dependence syndrome    Acute biliary pancreatitis without infection or necrosis    PAD (peripheral artery disease) (CMS/HCC)    Class 1 obesity due to excess calories in adult    The CT scan was equivocal for common bile duct stones with the elevation of the bilirubin it is possible.  We will plan for MRCP to evaluate for choledocholithiasis.  If choledocholithiasis we will have GI see for ERCP.  If there is no evidence of ongoing Rylee toco lithiasis we will wait for her pancreatitis to resolve and will offer her cholecystectomy as there was some suggestion of gallstones on the CT scan.  We will check in daily to see when this can be done possibly tomorrow but if her pain is not better he may have to wait until the weekend.    This note was created using Dragon Voice Recognition software.    Dipesh Rodrigues MD  11/05/20  11:57 EST

## 2020-11-05 NOTE — CONSULTS
GI CONSULT  NOTE:    Referring Provider:  DARLENE Leal    Chief complaint: Cholecystitis, elevated LFTs    Subjective .     History of present illness: Radha Chang is a 70 y.o. female with past medical history significant for arthritis, CAD, hyperlipidemia, hypertension, PAD, appendectomy, and hysterectomy who presents as a transfer from [facility] with complaints of abdominal pain and elevated LFTs.  The patient reportedly had a CT at the outlying facility that showed distended and thickened gallbladder consistent with cholecystitis, changes of acute pancreatitis, biliary duct dilation and mild distention of the distal pancreatic duct consistent with choledocholithiasis.  LFTs at [facility] were total bilirubin 1.7, , and ALT 99.  The patient was ultimately transferred to [facility] for further evaluation and treatment.  The patient reports that she began having epigastric pain that then radiated throughout her entire abdomen.  The pain is constant and was sharp in nature.  States that her pain has improved since yesterday.  She denies any nausea/vomiting.  Heartburn is typically controlled on omeprazole as an outpatient.  She denies dysphagia.  States that she does typically struggle with some constipation secondary to hydrocodone use as an outpatient.  She will use stool softeners and fiber daily.  Denies any bright red blood per rectum or melena.  No recent fevers.  She denies any previous history of elevated LFTs.  No history of liver disease.      Endo History:  No previous endoscopy.    Past Medical History:  Past Medical History:   Diagnosis Date   • Arthritis    • Coronary artery disease    • GERD (gastroesophageal reflux disease)    • Hyperlipidemia    • Hypertension    • PAD (peripheral artery disease) (CMS/HCC)        Past Surgical History:  Past Surgical History:   Procedure Laterality Date   • ANGIOPLASTY Right     right leg stent placed   •  APPENDECTOMY     • BACK SURGERY     • CARDIAC CATHETERIZATION     • HYSTERECTOMY     • ROTATOR CUFF REPAIR Right        Social History:  Social History     Tobacco Use   • Smoking status: Current Every Day Smoker     Packs/day: 1.00   • Smokeless tobacco: Never Used   Substance Use Topics   • Alcohol use: Not Currently   • Drug use: Never       Family History:  Family History   Problem Relation Age of Onset   • Cancer Mother         pancreatic   • Pancreatitis Sister        Medications:  Medications Prior to Admission   Medication Sig Dispense Refill Last Dose   • aspirin 81 MG chewable tablet Chew 81 mg Daily.      • atenolol (TENORMIN) 25 MG tablet Take 20 mg by mouth Daily.      • fenofibrate (TRICOR) 145 MG tablet Take 145 mg by mouth Daily.      • HYDROcodone-acetaminophen (NORCO)  MG per tablet Take 1 tablet by mouth Every 4 (Four) Hours As Needed for Moderate Pain .      • omeprazole (priLOSEC) 20 MG capsule Take 20 mg by mouth 2 (two) times a day.          Scheduled Meds:nicotine, 1 patch, Transdermal, Nightly  sodium chloride, 10 mL, Intravenous, Q12H      Continuous Infusions:sodium chloride, 100 mL/hr, Last Rate: 100 mL/hr (11/04/20 3871)      PRN Meds:.•  acetaminophen **OR** acetaminophen **OR** acetaminophen  •  aluminum-magnesium hydroxide-simethicone  •  bisacodyl  •  HYDROmorphone  •  ketorolac  •  magnesium hydroxide  •  melatonin  •  ondansetron **OR** ondansetron  •  sodium chloride    ALLERGIES:  Niacin and Kefzol [cefazolin]    ROS:  The following systems were reviewed and negative;   Constitution:  No fevers, chills, no unintentional weight loss  Skin: no rash, no jaundice  Eyes:  No blurry vision, no eye pain  HENT:  No change in hearing or smell  Resp:  No dyspnea or cough  CV:  No chest pain or palpitations  :  No dysuria, hematuria  Musculoskeletal:  No leg cramps or arthralgias  Neuro:  No tremor, no numbness  Psych:  No depression or confusion    Objective     Vital Signs:  "  Vitals:    11/04/20 2204 11/05/20 0102 11/05/20 0433 11/05/20 0816   BP: 123/53 136/64 115/62 127/53   BP Location: Left arm Left arm Left arm Left arm   Patient Position: Sitting Lying Sitting Sitting   Pulse: 78 71 83 78   Resp: 18 20 18 20   Temp: 97.8 °F (36.6 °C) 98.2 °F (36.8 °C) 97.7 °F (36.5 °C) 98.4 °F (36.9 °C)   TempSrc: Oral Oral Oral Oral   SpO2: 99% 96% 95% 94%   Weight: 90 kg (198 lb 6.6 oz)      Height: 162.6 cm (64\")          Physical Exam:       General Appearance:    Awake and alert, in no acute distress, overweight habitus   Head:    Normocephalic, without obvious abnormality, atraumatic   Throat:   No oral lesions, no thrush, oral mucosa moist   Lungs:     Respirations regular, even and unlabored   Chest Wall:    No abnormalities observed   Abdomen:     Soft, epigastric/right upper quadrant tenderness, no rebound or guarding, non-distended   Rectal:     Deferred   Extremities:   Moves all extremities, no edema, no cyanosis   Pulses:   Pulses palpable and equal bilaterally   Skin:   No rash, no jaundice, normal palpation   Lymph nodes:   No cervical, supraclavicular or submandibular palpable adenopathy   Neurologic:   Cranial nerves 2 - 12 grossly intact, no asterixis       Results Review:   I reviewed the patient's labs and imaging.  CBC    Results from last 7 days   Lab Units 11/05/20  0227   WBC 10*3/mm3 10.40   HEMOGLOBIN g/dL 12.1   PLATELETS 10*3/mm3 196     CMP   Results from last 7 days   Lab Units 11/05/20  0227   SODIUM mmol/L 138   POTASSIUM mmol/L 4.3   CHLORIDE mmol/L 105   CO2 mmol/L 26.0   BUN mg/dL 17   CREATININE mg/dL 0.71   GLUCOSE mg/dL 112*   ALBUMIN g/dL 4.10   BILIRUBIN mg/dL 0.6   ALK PHOS U/L 60   AST (SGOT) U/L 124*   ALT (SGPT) U/L 82*   MAGNESIUM mg/dL 1.6   PHOSPHORUS mg/dL 3.7   LIPASE U/L 523*     Cr Clearance Estimated Creatinine Clearance: 70.1 mL/min (by C-G formula based on SCr of 0.71 mg/dL).  Coag     HbA1C No results found for: HGBA1C  Blood Glucose No " results found for: POCGLU  Infection     UA      Radiology(recent) No radiology results for the last day       ASSESSMENT:  -Elevated LFTs -rule out choledocholithiasis  -Gallstone pancreatitis  -Epigastric/RUQ pain  -Abnormal CT showing changes of acute cholecystitis  -Arthritis  -CAD  -Hyperlipidemia  -Hypertension  -PAD  -History of appendectomy  -History of hysterectomy    PLAN:  Patient is a 70-year-old female with history of arthritis, CAD, hyperlipidemia, hypertension, PAD, appendectomy, and hysterectomy who presents as a transfer from [facility] with complaints of abdominal pain and elevated LFTs.  The patient reportedly had a CT at the outlying facility that showed distended and thickened gallbladder consistent with cholecystitis, changes of acute pancreatitis, biliary duct dilation and mild distention of the distal pancreatic duct consistent with choledocholithiasis.  LFTs at [facility] were total bilirubin 1.7, , and ALT 99.    LFTs are trending down.  Continue to monitor.  We will plan MRCP to assess for possible choledocholithiasis.  Consider ERCP pending results.  If MRCP is unremarkable, would recommend proceeding with laparoscopic cholecystectomy with possible IOC.  We will change fluid to LR due to patient's acute pancreatitis.  Maintain n.p.o.  Start PPI twice daily.  Antiemetics/analgesics as needed.  Supportive care.    I discussed the patients findings and my recommendations with the patient.  I will discuss the case with Dr. Dominguez and change plan accordingly.    We appreciate the referral.    Ana Vigil, YURI  11/05/20  09:18 EST

## 2020-11-05 NOTE — PAT
Inpt added to surgery schedule for 11/6/2020 with Dr. Rodrigues, all testing has been ordered, spoke with nurse Dewey.

## 2020-11-05 NOTE — PLAN OF CARE
Goal Outcome Evaluation:  Plan of Care Reviewed With: patient      Patient had MRI this morning and plan has advanced to surgery tomorrow with Dr. Rodrigues. Patient is agreeable to this plan. Patient still having significant pain but states that she feels better than she did on Tuesday and that the pain medicine does help. VSS, will continue to monitor.

## 2020-11-05 NOTE — PROGRESS NOTES
Discharge Planning Assessment   Saurav     Patient Name: Radha Chang  MRN: [id number]  Today's Date: 11/5/2020    Admit Date: 11/4/2020    Discharge Needs Assessment     Row Name 11/05/20 1432       Living Environment    Lives With  spouse    Current Living Arrangements  home/apartment/condo    Potentially Unsafe Housing Conditions  unable to assess    Primary Care Provided by  self    Provides Primary Care For  no one    Family Caregiver if Needed  spouse    Quality of Family Relationships  unable to assess       Resource/Environmental Concerns    Transportation Concerns  car, none       Transition Planning    Patient/Family Anticipates Transition to  home with family    Patient/Family Anticipated Services at Transition  none    Transportation Anticipated  family or friend will provide;car, drives self       Discharge Needs Assessment    Equipment Currently Used at Home  cane, straight    Provided Post Acute Provider List?  N/A    N/A Provider List Comment  patient denies any needs for discharge        Discharge Plan     Row Name 11/05/20 1442       Plan    Plan  anticipate return home    Patient/Family in Agreement with Plan  yes    Plan Comments  spoke to patient in room with ppe(mask and goggles); staying 6 feet away and for less than 15 mins; patient states she is independent at home with spouse and follows with dr bennett;         Patient Forms    Important Message from Medicare (IMM)  Delivered per documentation im given 11/4            Carol naegele rn  Case management  Office number [phone]  Cell phone [phone]

## 2020-11-06 ENCOUNTER — ANESTHESIA (OUTPATIENT)
Dept: PERIOP | Facility: HOSPITAL | Age: 70
End: 2020-11-06

## 2020-11-06 ENCOUNTER — APPOINTMENT (OUTPATIENT)
Dept: GENERAL RADIOLOGY | Facility: HOSPITAL | Age: 70
End: 2020-11-06

## 2020-11-06 LAB
ABO GROUP BLD: NORMAL
ALBUMIN SERPL-MCNC: 3.4 G/DL (ref 3.5–5.2)
ALBUMIN/GLOB SERPL: 1.4 G/DL
ALP SERPL-CCNC: 58 U/L (ref 39–117)
ALT SERPL W P-5'-P-CCNC: 50 U/L (ref 1–33)
AMMONIA BLD-SCNC: 20 UMOL/L (ref 11–51)
ANION GAP SERPL CALCULATED.3IONS-SCNC: 12 MMOL/L (ref 5–15)
AST SERPL-CCNC: 57 U/L (ref 1–32)
BASOPHILS # BLD AUTO: 0.2 10*3/MM3 (ref 0–0.2)
BASOPHILS NFR BLD AUTO: 1.4 % (ref 0–1.5)
BILIRUB SERPL-MCNC: 0.5 MG/DL (ref 0–1.2)
BLD GP AB SCN SERPL QL: NEGATIVE
BUN SERPL-MCNC: 16 MG/DL (ref 8–23)
BUN/CREAT SERPL: 23.9 (ref 7–25)
CALCIUM SPEC-SCNC: 9 MG/DL (ref 8.6–10.5)
CHLORIDE SERPL-SCNC: 103 MMOL/L (ref 98–107)
CO2 SERPL-SCNC: 22 MMOL/L (ref 22–29)
CREAT SERPL-MCNC: 0.67 MG/DL (ref 0.57–1)
CRP SERPL-MCNC: 22.13 MG/DL (ref 0–0.5)
DEPRECATED RDW RBC AUTO: 45.9 FL (ref 37–54)
EOSINOPHIL # BLD AUTO: 0 10*3/MM3 (ref 0–0.4)
EOSINOPHIL NFR BLD AUTO: 0.4 % (ref 0.3–6.2)
ERYTHROCYTE [DISTWIDTH] IN BLOOD BY AUTOMATED COUNT: 13.5 % (ref 12.3–15.4)
GFR SERPL CREATININE-BSD FRML MDRD: 87 ML/MIN/1.73
GLOBULIN UR ELPH-MCNC: 2.5 GM/DL
GLUCOSE SERPL-MCNC: 91 MG/DL (ref 65–99)
HCT VFR BLD AUTO: 32 % (ref 34–46.6)
HGB BLD-MCNC: 10.8 G/DL (ref 12–15.9)
INR PPP: 1.13 (ref 0.93–1.1)
LIPASE SERPL-CCNC: 76 U/L (ref 13–60)
LYMPHOCYTES # BLD AUTO: 1.9 10*3/MM3 (ref 0.7–3.1)
LYMPHOCYTES NFR BLD AUTO: 16 % (ref 19.6–45.3)
MCH RBC QN AUTO: 32.4 PG (ref 26.6–33)
MCHC RBC AUTO-ENTMCNC: 33.6 G/DL (ref 31.5–35.7)
MCV RBC AUTO: 96.6 FL (ref 79–97)
MONOCYTES # BLD AUTO: 0.7 10*3/MM3 (ref 0.1–0.9)
MONOCYTES NFR BLD AUTO: 6.3 % (ref 5–12)
NEUTROPHILS NFR BLD AUTO: 75.9 % (ref 42.7–76)
NEUTROPHILS NFR BLD AUTO: 9 10*3/MM3 (ref 1.7–7)
NRBC BLD AUTO-RTO: 0.1 /100 WBC (ref 0–0.2)
PLATELET # BLD AUTO: 170 10*3/MM3 (ref 140–450)
PMV BLD AUTO: 8.4 FL (ref 6–12)
POTASSIUM SERPL-SCNC: 3.9 MMOL/L (ref 3.5–5.2)
PROT SERPL-MCNC: 5.9 G/DL (ref 6–8.5)
PROTHROMBIN TIME: 12.4 SECONDS (ref 9.6–11.7)
RBC # BLD AUTO: 3.32 10*6/MM3 (ref 3.77–5.28)
RH BLD: POSITIVE
SODIUM SERPL-SCNC: 137 MMOL/L (ref 136–145)
T&S EXPIRATION DATE: NORMAL
WBC # BLD AUTO: 11.8 10*3/MM3 (ref 3.4–10.8)

## 2020-11-06 PROCEDURE — 25010000002 KETOROLAC TROMETHAMINE PER 15 MG: Performed by: PHYSICIAN ASSISTANT

## 2020-11-06 PROCEDURE — 25010000002 HYDROMORPHONE PER 4 MG: Performed by: NURSE ANESTHETIST, CERTIFIED REGISTERED

## 2020-11-06 PROCEDURE — 99232 SBSQ HOSP IP/OBS MODERATE 35: CPT | Performed by: NURSE PRACTITIONER

## 2020-11-06 PROCEDURE — 82140 ASSAY OF AMMONIA: CPT | Performed by: NURSE PRACTITIONER

## 2020-11-06 PROCEDURE — 47563 LAPARO CHOLECYSTECTOMY/GRAPH: CPT | Performed by: SURGERY

## 2020-11-06 PROCEDURE — 74300 X-RAY BILE DUCTS/PANCREAS: CPT

## 2020-11-06 PROCEDURE — 25010000002 PROPOFOL 10 MG/ML EMULSION: Performed by: NURSE ANESTHETIST, CERTIFIED REGISTERED

## 2020-11-06 PROCEDURE — 85610 PROTHROMBIN TIME: CPT | Performed by: NURSE PRACTITIONER

## 2020-11-06 PROCEDURE — 25010000002 FENTANYL CITRATE (PF) 100 MCG/2ML SOLUTION: Performed by: NURSE ANESTHETIST, CERTIFIED REGISTERED

## 2020-11-06 PROCEDURE — 86140 C-REACTIVE PROTEIN: CPT | Performed by: NURSE PRACTITIONER

## 2020-11-06 PROCEDURE — 99232 SBSQ HOSP IP/OBS MODERATE 35: CPT | Performed by: INTERNAL MEDICINE

## 2020-11-06 PROCEDURE — 86900 BLOOD TYPING SEROLOGIC ABO: CPT

## 2020-11-06 PROCEDURE — 80053 COMPREHEN METABOLIC PANEL: CPT | Performed by: PHYSICIAN ASSISTANT

## 2020-11-06 PROCEDURE — 86850 RBC ANTIBODY SCREEN: CPT | Performed by: SURGERY

## 2020-11-06 PROCEDURE — 83690 ASSAY OF LIPASE: CPT | Performed by: NURSE PRACTITIONER

## 2020-11-06 PROCEDURE — 88304 TISSUE EXAM BY PATHOLOGIST: CPT | Performed by: SURGERY

## 2020-11-06 PROCEDURE — 47563 LAPARO CHOLECYSTECTOMY/GRAPH: CPT | Performed by: NURSE PRACTITIONER

## 2020-11-06 PROCEDURE — BF111ZZ FLUOROSCOPY OF BILIARY AND PANCREATIC DUCTS USING LOW OSMOLAR CONTRAST: ICD-10-PCS | Performed by: SURGERY

## 2020-11-06 PROCEDURE — 86901 BLOOD TYPING SEROLOGIC RH(D): CPT

## 2020-11-06 PROCEDURE — 0 IOHEXOL 300 MG/ML SOLUTION: Performed by: SURGERY

## 2020-11-06 PROCEDURE — 25010000002 ONDANSETRON PER 1 MG: Performed by: NURSE ANESTHETIST, CERTIFIED REGISTERED

## 2020-11-06 PROCEDURE — 85025 COMPLETE CBC W/AUTO DIFF WBC: CPT | Performed by: PHYSICIAN ASSISTANT

## 2020-11-06 PROCEDURE — 86900 BLOOD TYPING SEROLOGIC ABO: CPT | Performed by: SURGERY

## 2020-11-06 PROCEDURE — 86901 BLOOD TYPING SEROLOGIC RH(D): CPT | Performed by: SURGERY

## 2020-11-06 PROCEDURE — 25010000002 HYDROMORPHONE PER 4 MG: Performed by: PHYSICIAN ASSISTANT

## 2020-11-06 PROCEDURE — 25010000002 LEVOFLOXACIN PER 250 MG: Performed by: SURGERY

## 2020-11-06 PROCEDURE — 25010000002 HYDROMORPHONE PER 4 MG: Performed by: SURGERY

## 2020-11-06 PROCEDURE — 99233 SBSQ HOSP IP/OBS HIGH 50: CPT | Performed by: SURGERY

## 2020-11-06 PROCEDURE — 0FT44ZZ RESECTION OF GALLBLADDER, PERCUTANEOUS ENDOSCOPIC APPROACH: ICD-10-PCS | Performed by: SURGERY

## 2020-11-06 DEVICE — LIGAMAX 5 MM ENDOSCOPIC MULTIPLE CLIP APPLIER
Type: IMPLANTABLE DEVICE | Site: ABDOMEN | Status: FUNCTIONAL
Brand: LIGAMAX

## 2020-11-06 DEVICE — SEAL HEMO SURG ARISTA/AH ABS/PWDR 3GM: Type: IMPLANTABLE DEVICE | Site: ABDOMEN | Status: FUNCTIONAL

## 2020-11-06 RX ORDER — HYDROMORPHONE HCL 110MG/55ML
0.5 PATIENT CONTROLLED ANALGESIA SYRINGE INTRAVENOUS
Status: DISCONTINUED | OUTPATIENT
Start: 2020-11-06 | End: 2020-11-06 | Stop reason: HOSPADM

## 2020-11-06 RX ORDER — NEOSTIGMINE METHYLSULFATE 5 MG/5 ML
SYRINGE (ML) INTRAVENOUS AS NEEDED
Status: DISCONTINUED | OUTPATIENT
Start: 2020-11-06 | End: 2020-11-06 | Stop reason: SURG

## 2020-11-06 RX ORDER — FENTANYL CITRATE 50 UG/ML
INJECTION, SOLUTION INTRAMUSCULAR; INTRAVENOUS AS NEEDED
Status: DISCONTINUED | OUTPATIENT
Start: 2020-11-06 | End: 2020-11-06 | Stop reason: SURG

## 2020-11-06 RX ORDER — ONDANSETRON 2 MG/ML
INJECTION INTRAMUSCULAR; INTRAVENOUS AS NEEDED
Status: DISCONTINUED | OUTPATIENT
Start: 2020-11-06 | End: 2020-11-06 | Stop reason: SURG

## 2020-11-06 RX ORDER — HYDROCODONE BITARTRATE AND ACETAMINOPHEN 10; 325 MG/1; MG/1
1 TABLET ORAL EVERY 4 HOURS PRN
Status: DISCONTINUED | OUTPATIENT
Start: 2020-11-06 | End: 2020-11-07 | Stop reason: HOSPADM

## 2020-11-06 RX ORDER — HYDROMORPHONE HCL 110MG/55ML
PATIENT CONTROLLED ANALGESIA SYRINGE INTRAVENOUS AS NEEDED
Status: DISCONTINUED | OUTPATIENT
Start: 2020-11-06 | End: 2020-11-06 | Stop reason: SURG

## 2020-11-06 RX ORDER — HYDROCODONE BITARTRATE AND ACETAMINOPHEN 5; 325 MG/1; MG/1
1 TABLET ORAL ONCE
Status: DISCONTINUED | OUTPATIENT
Start: 2020-11-06 | End: 2020-11-06

## 2020-11-06 RX ORDER — GLYCOPYRROLATE 1 MG/5 ML
SYRINGE (ML) INTRAVENOUS AS NEEDED
Status: DISCONTINUED | OUTPATIENT
Start: 2020-11-06 | End: 2020-11-06 | Stop reason: SURG

## 2020-11-06 RX ORDER — LIDOCAINE HYDROCHLORIDE AND EPINEPHRINE 10; 10 MG/ML; UG/ML
INJECTION, SOLUTION INFILTRATION; PERINEURAL AS NEEDED
Status: DISCONTINUED | OUTPATIENT
Start: 2020-11-06 | End: 2020-11-06 | Stop reason: HOSPADM

## 2020-11-06 RX ORDER — ROCURONIUM BROMIDE 10 MG/ML
INJECTION, SOLUTION INTRAVENOUS AS NEEDED
Status: DISCONTINUED | OUTPATIENT
Start: 2020-11-06 | End: 2020-11-06 | Stop reason: SURG

## 2020-11-06 RX ORDER — HYDROCODONE BITARTRATE AND ACETAMINOPHEN 5; 325 MG/1; MG/1
1 TABLET ORAL EVERY 6 HOURS PRN
Status: DISCONTINUED | OUTPATIENT
Start: 2020-11-06 | End: 2020-11-06 | Stop reason: ALTCHOICE

## 2020-11-06 RX ORDER — IBUPROFEN 400 MG/1
600 TABLET ORAL ONCE AS NEEDED
Status: DISCONTINUED | OUTPATIENT
Start: 2020-11-06 | End: 2020-11-06 | Stop reason: HOSPADM

## 2020-11-06 RX ORDER — LIDOCAINE HYDROCHLORIDE 20 MG/ML
INJECTION, SOLUTION EPIDURAL; INFILTRATION; INTRACAUDAL; PERINEURAL AS NEEDED
Status: DISCONTINUED | OUTPATIENT
Start: 2020-11-06 | End: 2020-11-06 | Stop reason: SURG

## 2020-11-06 RX ORDER — PROPOFOL 10 MG/ML
VIAL (ML) INTRAVENOUS AS NEEDED
Status: DISCONTINUED | OUTPATIENT
Start: 2020-11-06 | End: 2020-11-06 | Stop reason: SURG

## 2020-11-06 RX ORDER — PHENYLEPHRINE HCL IN 0.9% NACL 0.5 MG/5ML
SYRINGE (ML) INTRAVENOUS AS NEEDED
Status: DISCONTINUED | OUTPATIENT
Start: 2020-11-06 | End: 2020-11-06 | Stop reason: SURG

## 2020-11-06 RX ORDER — HYDROCODONE BITARTRATE AND ACETAMINOPHEN 10; 325 MG/1; MG/1
1 TABLET ORAL ONCE AS NEEDED
Status: DISCONTINUED | OUTPATIENT
Start: 2020-11-06 | End: 2020-11-06 | Stop reason: HOSPADM

## 2020-11-06 RX ADMIN — HYDROMORPHONE HYDROCHLORIDE 0.5 MG: 2 INJECTION INTRAMUSCULAR; INTRAVENOUS; SUBCUTANEOUS at 11:59

## 2020-11-06 RX ADMIN — KETOROLAC TROMETHAMINE 15 MG: 15 INJECTION, SOLUTION INTRAMUSCULAR; INTRAVENOUS at 05:30

## 2020-11-06 RX ADMIN — METRONIDAZOLE 500 MG: 500 INJECTION, SOLUTION INTRAVENOUS at 18:33

## 2020-11-06 RX ADMIN — LIDOCAINE HYDROCHLORIDE 100 MG: 20 INJECTION, SOLUTION EPIDURAL; INFILTRATION; INTRACAUDAL; PERINEURAL at 11:05

## 2020-11-06 RX ADMIN — PANTOPRAZOLE SODIUM 40 MG: 40 INJECTION, POWDER, FOR SOLUTION INTRAVENOUS at 20:31

## 2020-11-06 RX ADMIN — Medication 4 MG: at 12:04

## 2020-11-06 RX ADMIN — Medication 8 MG: at 12:04

## 2020-11-06 RX ADMIN — SODIUM CHLORIDE, POTASSIUM CHLORIDE, SODIUM LACTATE AND CALCIUM CHLORIDE 125 ML/HR: 600; 310; 30; 20 INJECTION, SOLUTION INTRAVENOUS at 06:33

## 2020-11-06 RX ADMIN — METRONIDAZOLE 500 MG: 500 INJECTION, SOLUTION INTRAVENOUS at 09:45

## 2020-11-06 RX ADMIN — FENTANYL CITRATE 50 MCG: 50 INJECTION, SOLUTION INTRAMUSCULAR; INTRAVENOUS at 10:58

## 2020-11-06 RX ADMIN — HYDROMORPHONE HYDROCHLORIDE 0.5 MG: 2 INJECTION INTRAMUSCULAR; INTRAVENOUS; SUBCUTANEOUS at 11:41

## 2020-11-06 RX ADMIN — HYDROMORPHONE HYDROCHLORIDE 1 MG: 2 INJECTION, SOLUTION INTRAMUSCULAR; INTRAVENOUS; SUBCUTANEOUS at 16:35

## 2020-11-06 RX ADMIN — ONDANSETRON 4 MG: 2 INJECTION INTRAMUSCULAR; INTRAVENOUS at 12:00

## 2020-11-06 RX ADMIN — FENTANYL CITRATE 50 MCG: 50 INJECTION, SOLUTION INTRAMUSCULAR; INTRAVENOUS at 11:35

## 2020-11-06 RX ADMIN — HYDROMORPHONE HYDROCHLORIDE 0.5 MG: 2 INJECTION INTRAMUSCULAR; INTRAVENOUS; SUBCUTANEOUS at 11:47

## 2020-11-06 RX ADMIN — HYDROMORPHONE HYDROCHLORIDE 1 MG: 2 INJECTION, SOLUTION INTRAMUSCULAR; INTRAVENOUS; SUBCUTANEOUS at 07:17

## 2020-11-06 RX ADMIN — PHENYLEPHRINE HYDROCHLORIDE 100 MCG: 10 INJECTION INTRAVENOUS at 11:19

## 2020-11-06 RX ADMIN — METRONIDAZOLE 500 MG: 500 INJECTION, SOLUTION INTRAVENOUS at 01:03

## 2020-11-06 RX ADMIN — HYDROMORPHONE HYDROCHLORIDE 1 MG: 2 INJECTION, SOLUTION INTRAMUSCULAR; INTRAVENOUS; SUBCUTANEOUS at 10:41

## 2020-11-06 RX ADMIN — LEVOFLOXACIN 750 MG: 5 INJECTION, SOLUTION INTRAVENOUS at 16:35

## 2020-11-06 RX ADMIN — HYDROMORPHONE HYDROCHLORIDE 1 MG: 2 INJECTION, SOLUTION INTRAMUSCULAR; INTRAVENOUS; SUBCUTANEOUS at 01:03

## 2020-11-06 RX ADMIN — Medication 10 ML: at 09:46

## 2020-11-06 RX ADMIN — PANTOPRAZOLE SODIUM 40 MG: 40 INJECTION, POWDER, FOR SOLUTION INTRAVENOUS at 09:45

## 2020-11-06 RX ADMIN — HYDROMORPHONE HYDROCHLORIDE 0.5 MG: 2 INJECTION, SOLUTION INTRAMUSCULAR; INTRAVENOUS; SUBCUTANEOUS at 12:52

## 2020-11-06 RX ADMIN — HYDROCODONE BITARTRATE AND ACETAMINOPHEN 1 TABLET: 5; 325 TABLET ORAL at 20:30

## 2020-11-06 RX ADMIN — ACETAMINOPHEN 650 MG: 325 TABLET, FILM COATED ORAL at 02:30

## 2020-11-06 RX ADMIN — HYDROCODONE BITARTRATE AND ACETAMINOPHEN 1 TABLET: 5; 325 TABLET ORAL at 22:37

## 2020-11-06 RX ADMIN — ROCURONIUM BROMIDE 40 MG: 10 INJECTION, SOLUTION INTRAVENOUS at 11:05

## 2020-11-06 RX ADMIN — SODIUM CHLORIDE, POTASSIUM CHLORIDE, SODIUM LACTATE AND CALCIUM CHLORIDE: 600; 310; 30; 20 INJECTION, SOLUTION INTRAVENOUS at 10:54

## 2020-11-06 RX ADMIN — PROPOFOL 150 MG: 10 INJECTION, EMULSION INTRAVENOUS at 11:05

## 2020-11-06 RX ADMIN — Medication 10 ML: at 20:31

## 2020-11-06 NOTE — OP NOTE
Operative Report:    Patient Name:  Radha Chang  YOB: 1950    Date of Surgery:  11/6/2020     Indications: 70-year-old lady who was transferred from [facility] with pancreatitis likely gallstone pancreatitis.  She went underwent MRCP here in the hospital which not show any evidence of common bile duct stones.  As her pancreatitis resolved I counseled her about the risks and benefits of cholecystectomy to prevent recurrent gallstone pancreatitis she understood the risks of the operation and was willing to proceed.    Pre-op Diagnosis:   Acute biliary pancreatitis without infection or necrosis [K85.10]       Post-Op Diagnosis Codes:     * Acute biliary pancreatitis without infection or necrosis [K85.10]    Procedure/CPT® Codes:      Procedure(s):  CHOLECYSTECTOMY LAPAROSCOPIC INTRAOPERATIVE CHOLANGIOGRAM    Staff:  Surgeon(s):  Dipesh Rodrigues MD    Assistant: Phyllis Morgan APRN    Anesthesia: General    Estimated Blood Loss: minimal    Implants:    Implant Name Type Inv. Item Serial No.  Lot No. LRB No. Used Action   CLIPAPPLR M/ ENDO LIGAMAX5 5MM 33CM MD/GUILLERMO - [id number] Implant CLIPAPPLR M/ ENDO LIGAMAX5 5MM 33CM MD/LG  [employer]  DIV OF J AND J [id number] N/A 1 Implanted   SEAL HEMO ABS RANDY AH PWDR 3GM - [id number] Implant SEAL HEMO ABS RANDY AH PWDR 3GM  MEDAFOR HEMOSTATIS POLYMER Medudem [id number] N/A 1 Implanted       Specimen:          Specimens     ID Source Type Tests Collected By Collected At Frozen?      A Gallbladder Tissue · TISSUE PATHOLOGY EXAM   Dipesh Rodrigues MD 11/6/20 1128      Description: gallbladder               Findings: Intraoperative cholangiogram showing filling of the cystic duct, bile duct, hepatic duct right and left hepatic ducts they were dilated but there was no evidence of gallstone within the distal common bile duct there was normal spillage of contrast into the duodenum.    Complications: None    Description of  Procedure: Patient was taken to the operating room placed on the operating table in the supine position under general anesthesia.  Her abdomen was prepped and draped normal sterile fashion.  Timeout was performed identifying the correct patient procedure site of operation.  I began the operation by entering the abdomen through infraumbilical skin incision the fascia was grasped elevated incised the peritoneum was entered bluntly.  The Brody trocar was placed the abdomen insufflated camera introduced there is no evidence of injury to underlying structures.  She was placed reverse Trendelenburg with the right side up.  The epigastric and 2 right subcostal 5 mm ports were placed under laparoscopic guidance.  The gallbladder was grasped retracted towards the right upper quadrant and the infundibulum tracked laterally the base the gallbladder was dissected free from surrounding tissues I was able to identify the cystic duct and cystic artery.  I put clips on the cystic artery prior to the cholangiogram.  The Hummel clamp was placed across the base the gallbladder the catheter was placed in the bile duct and the intraoperative cholangiogram was obtained.  This showed opacification of the cystic duct which then led into the bile duct.  The common bile duct was dilated with there is no evidence of stones downstream there was a smooth taper with spillage of contrast to the duodenum.  The common hepatic duct and right and left hepatic ducts were dilated but were opacified without any evidence of stones.  That they completed the cholangiogram.  The cystic duct was then doubly clipped and divided the cystic artery was divided the gallbladder was dissected off of the liver edge using the Bovie.  There was some bleeding which was stopped using the Bovie.  There were still gallstones which were evacuated with the suction .  The gallbladder was placed in Endo Catch bag and removed.  Her upper quadrant was thoroughly  inspected it was suctioned hemostasis was achieved Gaurav was placed at the surgical dissection planes.  Upon inspection she did have some adhesions in the right lower quadrant from her previous appendectomy there are also some lesions in the left abdomen from the omentum to the abdominal wall.  The remainder of the inspection was normal.  The ports were then removed without any abdominal wall hemorrhage the Brody trocar site was closed with 0 Vicryl suture the skin was closed with 4-0 Vicryl suture and skin affix was placed on the wounds.      Dipesh Rodrigues MD     Date: 11/6/2020  Time: 12:31 EST    This note was created using Dragon Voice Recognition software.

## 2020-11-06 NOTE — ANESTHESIA PROCEDURE NOTES
Peripheral IV    Patient location during procedure: OR  Start time: 11/6/2020 11:10 AM  End time: 11/6/2020 11:14 AM  Performed By   CRNA: Lizeth Dorado CRNA  Preanesthetic Checklist  Completed: patient identified, site marked, surgical consent, pre-op evaluation, timeout performed, IV checked, risks and benefits discussed and monitors and equipment checked  Peripheral IV Prep   Patient position: supine   Prep: ChloraPrep  Peripheral IV Procedure   Laterality:left  Location:  Wrist  Catheter size: 20 G         Post Assessment   Dressing Type: transparent.    IV Dressing/Site: clean, dry and intact

## 2020-11-06 NOTE — PLAN OF CARE
Goal Outcome Evaluation:  Plan of Care Reviewed With: patient     Outcome Summary: Very painful tonight-back pain. Taking prn pain meds through out the night.Expecting potential surgery today and has signed consent. Alert and oriented x4 and understanding of possibility of OR today.

## 2020-11-06 NOTE — PROGRESS NOTES
General Surgery Progress Note     LOS: 2 days   Patient Care Team:  Christian Salvador MD as PCP - General (Family Medicine)    Subjective     Interval History:   Patient has had an interval improvement in her abdominal pain.  Still not having much of an appetite.  She now complains of severe back pain from laying flat for the MRI.  No significant bowel function since arrival.    Objective     Vital Signs  Temp:  [97.6 °F (36.4 °C)-98.5 °F (36.9 °C)] 97.6 °F (36.4 °C)  Heart Rate:  [87-94] 90  Resp:  [17-20] 18  BP: (139-178)/(63-70) 150/67    Physical Exam  Constitutional:       Appearance: Normal appearance.   HENT:      Head: Normocephalic and atraumatic.   Eyes:      General: No scleral icterus.     Conjunctiva/sclera: Conjunctivae normal.   Cardiovascular:      Rate and Rhythm: Normal rate.      Pulses: Normal pulses.   Pulmonary:      Effort: Pulmonary effort is normal. No respiratory distress.   Abdominal:      Comments: Soft and obese mild focal tender to palpation right upper quadrant without peritoneal signs   Musculoskeletal:         General: Swelling present.   Skin:     General: Skin is warm.   Neurological:      General: No focal deficit present.      Mental Status: She is alert. Mental status is at baseline.   Psychiatric:         Mood and Affect: Mood normal.         Behavior: Behavior normal.            Results Review:    Lab Results (last 24 hours)     Procedure Component Value Units Date/Time    Comprehensive Metabolic Panel [[id number]]  (Abnormal) Collected: 11/06/20 0157    Specimen: Blood Updated: 11/06/20 0225     Glucose 91 mg/dL      BUN 16 mg/dL      Creatinine 0.67 mg/dL      Sodium 137 mmol/L      Potassium 3.9 mmol/L      Comment: Slight hemolysis detected by analyzer. Results may be affected.        Chloride 103 mmol/L      CO2 22.0 mmol/L      Calcium 9.0 mg/dL      Total Protein 5.9 g/dL      Albumin 3.40 g/dL      ALT (SGPT) 50 U/L      AST (SGOT) 57 U/L      Alkaline  Phosphatase 58 U/L      Total Bilirubin 0.5 mg/dL      eGFR Non African Amer 87 mL/min/1.73      Globulin 2.5 gm/dL      A/G Ratio 1.4 g/dL      BUN/Creatinine Ratio 23.9     Anion Gap 12.0 mmol/L     Narrative:      GFR Normal >60  Chronic Kidney Disease <60  Kidney Failure <15      Lipase [[id number]]  (Abnormal) Collected: 11/06/20 0157    Specimen: Blood Updated: 11/06/20 0225     Lipase 76 U/L     C-reactive Protein [[id number]]  (Abnormal) Collected: 11/06/20 0157    Specimen: Blood Updated: 11/06/20 0225     C-Reactive Protein 22.13 mg/dL     Ammonia [[id number]]  (Normal) Collected: 11/06/20 0157    Specimen: Blood Updated: 11/06/20 0224     Ammonia 20 umol/L     Protime-INR [[id number]]  (Abnormal) Collected: 11/06/20 0156    Specimen: Blood Updated: 11/06/20 0218     Protime 12.4 Seconds      INR 1.13    CBC & Differential [[id number]]  (Abnormal) Collected: 11/06/20 0157    Specimen: Blood Updated: 11/06/20 0204    Narrative:      The following orders were created for panel order CBC & Differential.  Procedure                               Abnormality         Status                     ---------                               -----------         ------                     CBC Auto Differential[[id number]]        Abnormal            Final result                 Please view results for these tests on the individual orders.    CBC Auto Differential [[id number]]  (Abnormal) Collected: 11/06/20 0157    Specimen: Blood Updated: 11/06/20 0204     WBC 11.80 10*3/mm3      RBC 3.32 10*6/mm3      Hemoglobin 10.8 g/dL      Hematocrit 32.0 %      MCV 96.6 fL      MCH 32.4 pg      MCHC 33.6 g/dL      RDW 13.5 %      RDW-SD 45.9 fl      MPV 8.4 fL      Platelets 170 10*3/mm3      Neutrophil % 75.9 %      Lymphocyte % 16.0 %      Monocyte % 6.3 %      Eosinophil % 0.4 %      Basophil % 1.4 %      Neutrophils, Absolute 9.00 10*3/mm3      Lymphocytes, Absolute 1.90 10*3/mm3      Monocytes, Absolute 0.70 10*3/mm3      Eosinophils,  Absolute 0.00 10*3/mm3      Basophils, Absolute 0.20 10*3/mm3      nRBC 0.1 /100 WBC     COVID PRE-OP / PRE-PROCEDURE SCREENING ORDER (NO ISOLATION) - Swab, Nasopharynx [[id number]]  (Normal) Collected: 11/05/20 1400    Specimen: Swab from Nasopharynx Updated: 11/05/20 1533    Narrative:      The following orders were created for panel order COVID PRE-OP / PRE-PROCEDURE SCREENING ORDER (NO ISOLATION) - Swab, Nasopharynx.  Procedure                               Abnormality         Status                     ---------                               -----------         ------                     COVID-19,CEPHEID,COR/EMRCEDEZ...[[id number]]  Normal              Final result                 Please view results for these tests on the individual orders.    COVID-19,CEPHEID,COR/MERCEDEZ/PAD IN-HOUSE(OR EMERGENT/ADD-ON),NP SWAB IN TRANSPORT MEDIA 3-4 HR TAT - Swab, Nasopharynx [[id number]]  (Normal) Collected: 11/05/20 1400    Specimen: Swab from Nasopharynx Updated: 11/05/20 1533     COVID19 Not Detected    Narrative:      Fact sheet for providers: https://www.fda.gov/media/089842/download     Fact sheet for patients: https://www.fda.gov/media/547755/download        Imaging Results (Last 24 Hours)     Procedure Component Value Units Date/Time    XR Chest PA & Lateral [[id number]] Collected: 11/05/20 1842     Updated: 11/05/20 1845    Narrative:      EXAMINATION: XR CHEST PA AND LATERAL-     DATE OF EXAM: 11/5/2020 6:23 PM     INDICATION: pre op; K85.10-Biliary acute pancreatitis without necrosis  or infection; Z00.6-Encounter for examination for normal comparison and  control in clinical research program.  Preoperative evaluation prior to  cholecystectomy.      COMPARISON: None available     TECHNIQUE: PA and Lateral views of the chest were obtained.     FINDINGS:  The cardiomediastinal silhouette is within normal limits. Pulmonary  vascularity is unremarkable. There is no focal airspace consolidation,  pleural effusion, or pneumothorax.  There is biapical pleural-parenchymal  scarring. There are degenerative changes of the thoracic spine.       Impression:      No acute cardiopulmonary abnormality.     Electronically Signed By-Aristides Galeano On:11/5/2020 6:43 PM  This report was finalized on [id number] by  Aristides Galeano, .    MRI abdomen wo contrast mrcp [[id number]] Collected: 11/05/20 1157     Updated: 11/05/20 1204    Narrative:      MRI ABDOMEN WO CONTRAST MRCP-     Date of Exam: 11/5/2020 11:15 AM     Indication: rule out choledocholithiasis, elevated LFTs.  Severe  abdominal pain for one day. Previous appendectomy.     Comparison: CT abdomen and pelvis with contrast performed at [facility] 11/04/2020.     Technique:  Multiplanar multisequence MR images of the abdomen was  performed without contrast. MRCP was performed.         FINDINGS:     Several of the images are degraded by motion.     Cholelithiasis is seen. There is abnormal wall thickening of the  gallbladder with pericholecystic fluid consistent with acute  cholecystitis. The common bile duct demonstrates fusiform dilation up to  11 mm, without high-grade stricture. No choledocholithiasis is seen. The  intrahepatic bile ducts are mildly prominent at 5 mm.     The pancreatic duct appears nondilated.     Inflammatory changes are centered around the pancreatic head and the  second-third duodenal segments, and may represent secondary reactive  inflammation. Acute pancreatitis and/or duodenitis have a similar  appearance.     A 13 mm T2 hyperintense lesion is seen within the medial left hepatic  segment is a segment IVb), statistically favored to represent a cyst.  The liver is diffusely steatotic, and is enlarged up to 18 cm, but does  not appear frankly cirrhotic.     The spleen, adrenals, and kidneys are within normal limits. There are  surgical changes of posterior lumbar spine fusion. Mild lumbar curvature  toward the left.     The included lung bases are clear, and the  heart size appears within  normal limits.          Impression:         1. Acute calculus cholecystitis.  2. Fusiform dilation of the common bile duct, and mild dilation of  intrahepatic bile ducts. No high-grade biliary stricture or  choledocholithiasis is seen.  3. Normal appearance of the pancreatic duct.  4. Inflammatory changes centered within the pancreatic head and the  second-third duodenal segments. FINDINGS suggest  pancreatitis/duodenitis. These findings could be secondary to  gallbladder inflammation, as well.  4. Hepatomegaly with diffuse hepatic steatosis. Presumed left hepatic  cyst.  5. Lower lumbar spinal fusion.        Electronically Signed By-Dr. Tavia Domingo MD On:11/5/2020 12:02 PM  This report was finalized on [id number] by Dr. Tavia Domingo MD.    CT Outside Films [[id number]] Resulted: 11/05/20 1137     Updated: 11/05/20 1137    Narrative:      This procedure was auto-finalized with no dictation required.         I reviewed the patient's new clinical results.    Medication Review:    Current Facility-Administered Medications:   •  acetaminophen (TYLENOL) tablet 650 mg, 650 mg, Oral, Q4H PRN, 650 mg at 11/06/20 0230 **OR** acetaminophen (TYLENOL) 160 MG/5ML solution 650 mg, 650 mg, Oral, Q4H PRN **OR** acetaminophen (TYLENOL) suppository 650 mg, 650 mg, Rectal, Q4H PRN, Era Steiner PA-C  •  aluminum-magnesium hydroxide-simethicone (MAALOX MAX) 400-400-40 MG/5ML suspension 15 mL, 15 mL, Oral, Q6H PRN, Era Steiner PA-C  •  bisacodyl (DULCOLAX) suppository 10 mg, 10 mg, Rectal, Daily PRN, Era Steiner PA-C  •  HYDROmorphone (DILAUDID) injection 1 mg, 1 mg, Intravenous, Q3H PRN, Era Steiner PA-C, 1 mg at 11/06/20 0717  •  lactated ringers infusion, 125 mL/hr, Intravenous, Continuous, Ana Vigil APRN, Last Rate: 125 mL/hr at 11/06/20 0633, 125 mL/hr at 11/06/20 0633  •  levoFLOXacin (LEVAQUIN) 750 mg/150 mL D5W (premix) (LEVAQUIN) 750 mg, 750 mg, Intravenous, Q24H,  Ana Vigil, APRN, Stopped at 11/05/20 1930  •  magnesium hydroxide (MILK OF MAGNESIA) suspension 2400 mg/10mL 10 mL, 10 mL, Oral, Daily PRN, Era Steiner PA-C  •  melatonin tablet 5 mg, 5 mg, Oral, Nightly PRN, Era Steiner PA-C  •  metroNIDAZOLE (FLAGYL) 500 mg/100mL IVPB, 500 mg, Intravenous, Q8H, Ana Vigil, APRN, Last Rate: 0 mL/hr at 11/06/20 0150, 500 mg at 11/06/20 0945  •  nicotine (NICODERM CQ) 21 MG/24HR patch 1 patch, 1 patch, Transdermal, Nightly, Era Steiner PA-C, 1 patch at 11/05/20 2023  •  ondansetron (ZOFRAN) tablet 4 mg, 4 mg, Oral, Q6H PRN **OR** ondansetron (ZOFRAN) injection 4 mg, 4 mg, Intravenous, Q6H PRN, Era Steiner PA-C  •  pantoprazole (PROTONIX) injection 40 mg, 40 mg, Intravenous, BID, Ana Vigil, APRN, 40 mg at 11/06/20 0945  •  sodium chloride 0.9 % flush 10 mL, 10 mL, Intravenous, Q12H, Era Steiner PA-C, 10 mL at 11/05/20 2023  •  sodium chloride 0.9 % flush 10 mL, 10 mL, Intravenous, PRN, Era Steiner PA-C    Assessment/Plan     Principal Problem:    Calculus of bile duct with acute cholecystitis and obstruction  Active Problems:    Hyperlipidemia    Hypertension    Tobacco dependence syndrome    Acute biliary pancreatitis without infection or necrosis    PAD (peripheral artery disease) (CMS/HCC)    Class 1 obesity due to excess calories in adult      MRI did not show any evidence of choledocholithiasis.  No plan for ERCP at present.  I have counseled her about the likely diagnosis of gallstone pancreatitis and the decision to move forward with cholecystectomy for preventing recurrent gallstone pancreatitis.  There is also a possibility that there is a degree of cholecystitis.  I talked to her about the nonsurgical management including watching and waiting which could yield a recurrent pancreatitis which can be life-threatening.  We talked about the risks of surgery including but not limited to bleeding infection scarring hernia  intra-abdominal injury to bowel liver bile ducts bleeding and bile leaking.  After discussion about the risks of the operation the benefits the side effects of being status post cholecystectomy she understands the risk and does want to proceed with the operation.  I will try to do a cholangiogram during the operation to better evaluate the bile ducts if there is common bile duct stones she may still require ERCP.  She understands the decision making today and is willing to proceed.    This note was created using Dragon Voice Recognition software.    Dipesh Rodrigues MD  11/06/20  09:46 EST

## 2020-11-06 NOTE — PROGRESS NOTES
LOS: 2 days   Patient Care Team:  Christian Salvador MD as PCP - General (Family Medicine)      Subjective     Interval History:     Subjective: Patient denies any abdominal pain this morning.  Her main complaint is severe back pain.  No nausea/vomiting.      ROS:   No chest pain, shortness of breath, or cough.        Medication Review:     Current Facility-Administered Medications:   •  acetaminophen (TYLENOL) tablet 650 mg, 650 mg, Oral, Q4H PRN, 650 mg at 11/06/20 0230 **OR** acetaminophen (TYLENOL) 160 MG/5ML solution 650 mg, 650 mg, Oral, Q4H PRN **OR** acetaminophen (TYLENOL) suppository 650 mg, 650 mg, Rectal, Q4H PRN, Era Steiner PA-C  •  aluminum-magnesium hydroxide-simethicone (MAALOX MAX) 400-400-40 MG/5ML suspension 15 mL, 15 mL, Oral, Q6H PRN, Era Steiner PA-C  •  bisacodyl (DULCOLAX) suppository 10 mg, 10 mg, Rectal, Daily PRN, rEa Steiner PA-C  •  HYDROmorphone (DILAUDID) injection 1 mg, 1 mg, Intravenous, Q3H PRN, Era Steiner PA-C, 1 mg at 11/06/20 0717  •  lactated ringers infusion, 125 mL/hr, Intravenous, Continuous, Ana Vigil, APRN, Last Rate: 125 mL/hr at 11/06/20 0633, 125 mL/hr at 11/06/20 0633  •  levoFLOXacin (LEVAQUIN) 750 mg/150 mL D5W (premix) (LEVAQUIN) 750 mg, 750 mg, Intravenous, Q24H, Ana Vigil, APRN, Stopped at 11/05/20 1930  •  magnesium hydroxide (MILK OF MAGNESIA) suspension 2400 mg/10mL 10 mL, 10 mL, Oral, Daily PRN, Era Steiner PA-C  •  melatonin tablet 5 mg, 5 mg, Oral, Nightly PRN, Era Steiner PA-C  •  metroNIDAZOLE (FLAGYL) 500 mg/100mL IVPB, 500 mg, Intravenous, Q8H, Ana Vigil APRN, Last Rate: 0 mL/hr at 11/06/20 0150, 500 mg at 11/06/20 0945  •  nicotine (NICODERM CQ) 21 MG/24HR patch 1 patch, 1 patch, Transdermal, Nightly, Era Steiner PA-C, 1 patch at 11/05/20 2023  •  ondansetron (ZOFRAN) tablet 4 mg, 4 mg, Oral, Q6H PRN **OR** ondansetron (ZOFRAN) injection 4 mg, 4 mg, Intravenous, Q6H PRN, Era Steiner  REVA REYNOSO  •  pantoprazole (PROTONIX) injection 40 mg, 40 mg, Intravenous, BID, Ana Vigil, APRN, 40 mg at 11/06/20 0945  •  sodium chloride 0.9 % flush 10 mL, 10 mL, Intravenous, Q12H, Era Steiner PA-C, 10 mL at 11/06/20 0946  •  sodium chloride 0.9 % flush 10 mL, 10 mL, Intravenous, PRN, Era Steiner PA-C      Objective     Vital Signs  Vitals:    11/05/20 1610 11/05/20 2151 11/06/20 0515 11/06/20 0808   BP: 145/63 147/69 178/70 150/67   BP Location: Right arm Left arm Left arm Left arm   Patient Position: Sitting Lying Sitting Sitting   Pulse: 87 91 94 90   Resp: 18 18 20 18   Temp: 98.5 °F (36.9 °C) 98.4 °F (36.9 °C) 98.2 °F (36.8 °C) 97.6 °F (36.4 °C)   TempSrc: Oral Oral Oral Oral   SpO2: 97% 94% 97% 98%   Weight:       Height:           Physical Exam:     General Appearance:    Awake and alert, in no acute distress, obese habitus   Head:    Normocephalic, without obvious abnormality   Eyes:          Conjunctivae normal, anicteric sclera   Throat:   No oral lesions, no thrush, oral mucosa moist   Neck:   No adenopathy, supple, no JVD   Lungs:     respirations regular, even and unlabored   Abdomen:     Soft, RUQ tenderness, no rebound or guarding, non-distended   Rectal:     Deferred   Extremities:   No edema, no cyanosis   Skin:   No bruising or rash, no jaundice        Results Review:    CBC    Results from last 7 days   Lab Units 11/06/20 0157 11/05/20 0227   WBC 10*3/mm3 11.80* 10.40   HEMOGLOBIN g/dL 10.8* 12.1   PLATELETS 10*3/mm3 170 196     CMP   Results from last 7 days   Lab Units 11/06/20  0157 11/05/20 0227   SODIUM mmol/L 137 138   POTASSIUM mmol/L 3.9 4.3   CHLORIDE mmol/L 103 105   CO2 mmol/L 22.0 26.0   BUN mg/dL 16 17   CREATININE mg/dL 0.67 0.71   GLUCOSE mg/dL 91 112*   ALBUMIN g/dL 3.40* 4.10   BILIRUBIN mg/dL 0.5 0.6   ALK PHOS U/L 58 60   AST (SGOT) U/L 57* 124*   ALT (SGPT) U/L 50* 82*   MAGNESIUM mg/dL  --  1.6   PHOSPHORUS mg/dL  --  3.7   LIPASE U/L 76* 523*   AMMONIA  umol/L 20  --      Cr Clearance Estimated Creatinine Clearance: 70.1 mL/min (by C-G formula based on SCr of 0.67 mg/dL).  Coag   Results from last 7 days   Lab Units 11/06/20  0156   INR  1.13*     HbA1C No results found for: HGBA1C  Blood Glucose No results found for: POCGLU  Infection     UA      Radiology(recent) Mri Abdomen Wo Contrast Mrcp    Result Date: 11/5/2020   1. Acute calculus cholecystitis. 2. Fusiform dilation of the common bile duct, and mild dilation of intrahepatic bile ducts. No high-grade biliary stricture or choledocholithiasis is seen. 3. Normal appearance of the pancreatic duct. 4. Inflammatory changes centered within the pancreatic head and the second-third duodenal segments. FINDINGS suggest pancreatitis/duodenitis. These findings could be secondary to gallbladder inflammation, as well. 4. Hepatomegaly with diffuse hepatic steatosis. Presumed left hepatic cyst. 5. Lower lumbar spinal fusion.   Electronically Signed By-Dr. Tavia Domingo MD On:11/5/2020 12:02 PM This report was finalized on [id number] by Dr. Tavia Domingo MD.    Xr Chest Pa & Lateral    Result Date: 11/5/2020  No acute cardiopulmonary abnormality.  Electronically Signed By-Aristides Galeano On:11/5/2020 6:43 PM This report was finalized on [id number] by  Aristides Galeano, .         Assessment/Plan     ASSESSMENT:  -Elevated LFTs -rule out choledocholithiasis  -Gallstone pancreatitis  -Epigastric/RUQ pain  -Abnormal CT showing changes of acute cholecystitis  -Arthritis  -CAD  -Hyperlipidemia  -Hypertension  -PAD  -History of appendectomy  -History of hysterectomy    PLAN:  Patient is a 70-year-old female with history of arthritis, CAD, hyperlipidemia, hypertension, PAD, appendectomy, and hysterectomy who presents as a transfer from [facility] with complaints of abdominal pain and elevated LFTs.  The patient reportedly had a CT at the outlying facility that showed distended and thickened gallbladder  consistent with cholecystitis, changes of acute pancreatitis, biliary duct dilation and mild distention of the distal pancreatic duct consistent with choledocholithiasis.  LFTs at [facility] were total bilirubin 1.7, , and ALT 99.  MRCP yesterday did not show any evidence of choledocholithiasis.     Patient denies any abdominal pain this morning.  Her main complaint is back pain.  MRCP yesterday did not show any evidence of choledocholithiasis.  LFTs continue to trend down.  Continue to monitor.  Lipase has also trended down significantly.  CRP is markedly elevated at 22.13.  Plan is for cholecystectomy once general surgery deems it safe from a pancreatitis standpoint.  Continue antiemetics/analgesics as needed.  Also continue aggressive IV fluid hydration.  Continue PPI twice daily.  Not much else to add from GI standpoint at this time.  We will see as needed.    Ana Vigil, YURI  11/06/20  10:06 EST

## 2020-11-06 NOTE — PLAN OF CARE
Goal Outcome Evaluation:  Plan of Care Reviewed With: patient     Outcome Summary: Patient is A&O x4. Able to verbalize and make needs known. Complaints of pain noted during shift. PRN medications administered as requested. Tolerated surgery well. Call light within reach. Will continue to monitor.

## 2020-11-06 NOTE — ANESTHESIA PROCEDURE NOTES
Airway  Urgency: elective    Date/Time: 11/6/2020 11:08 AM  Airway not difficult    General Information and Staff    Patient location during procedure: OR  Anesthesiologist: Brandon Gould MD  CRNA: Lizeth Dorado CRNA    Indications and Patient Condition  Indications for airway management: airway protection    Preoxygenated: yes  Mask difficulty assessment: 1 - vent by mask    Final Airway Details  Final airway type: endotracheal airway      Successful airway: ETT    Successful intubation technique: direct laryngoscopy  Facilitating devices/methods: intubating stylet  Endotracheal tube insertion site: oral  Blade: Elvira  Blade size: 3  ETT size (mm): 7.5  Cormack-Lehane Classification: grade I - full view of glottis  Placement verified by: chest auscultation and capnometry   Measured from: lips  ETT/EBT  to lips (cm): 21  Number of attempts at approach: 1  Assessment: lips, teeth, and gum same as pre-op

## 2020-11-06 NOTE — PROGRESS NOTES
Continued Stay Note  [facility]     Patient Name: Radha Chang  MRN: [id number]  Today's Date: 11/6/2020    Admit Date: 11/4/2020    Discharge Plan     Row Name 11/06/20 1410       Plan    Plan  anticipate return home    Plan Comments  Barriers to discharge: Patient having a lap choley today 11/6          Expected Discharge Date and Time     Expected Discharge Date Expected Discharge Time    Nov 8, 2020               Anna [place] [address]   [phone]  office  [phone]  fax  [email or web address]

## 2020-11-06 NOTE — PROGRESS NOTES
[facility] Daily Progress Note      Hospitalist Team  LOS 2 days      Patient Care Team:  Christian Salvador MD as PCP - General (Family Medicine)    Patient Location: 4110/1      Subjective   Subjective     Chief Complaint / Subjective  No chief complaint on file.        Brief Synopsis of Hospital Course/HPI  Ms. Chang is a 70 y.o.  presents to [facility] complaining of constant, abdominal pain that woke the patient up on the AM of 11/4/2020 at 0545 without any associated with nausea and decreased appetite. Nothing makes it better and movement makes it worse. Patient reports that the evening before she ate fast food which is abnormal for her. She denies ever having any pain like this before. The pain is diffusely located bit more concentrated in the RUQ and epigastric region. Patient denies any fever, chills, chest pain, SOA, cough, sputum, peripheral edema, dysuria, or recent travel/sick contacts/hospitalizations.      Documentation, labs, and imaging from [facility] ED reviewed and patient noted to have normal renal function, elevated  and ALT 99, Tbili 1.7, elevated WBC 13, significantly elevated lipase 1215 and amylase 814. On CT A/P W patient was noted to have distended and thickened wall gall bladder that is consistent with acute cholecystitis, peripancreatic fat stranding consistent with acute pancreatitis, and biliary ductal dilation and mild distention of distal pancreatic duct consistent with choledocholithiasis.       Date:  11/5/20: abd pain    11/6/20-s/p CHOLECYSTECTOMY LAPAROSCOPIC INTRAOPERATIVE CHOLANGIOGRAM, doing well after surgery.      Review of Systems   Constitution: Negative.   HENT: Negative.    Eyes: Negative.    Cardiovascular: Negative.    Respiratory: Negative.    Endocrine: Negative.    Hematologic/Lymphatic: Negative.    Skin: Negative.    Musculoskeletal: Negative.    Gastrointestinal: Positive for abdominal pain.    "  Genitourinary: Negative.    Neurological: Negative.    Psychiatric/Behavioral: Negative.    Allergic/Immunologic: Negative.    All other systems reviewed and are negative.        Objective   Objective      Vital Signs  Temp:  [96.9 °F (36.1 °C)-98.4 °F (36.9 °C)] 98.1 °F (36.7 °C)  Heart Rate:  [] 90  Resp:  [14-21] 18  BP: (117-178)/(39-71) 148/71  Oxygen Therapy  SpO2: 93 %  Pulse Oximetry Type: Intermittent  Device (Oxygen Therapy): room air  Flow (L/min): 2  Flowsheet Rows      First Filed Value   Admission Height  162.6 cm (64\") Documented at 11/04/2020 2204   Admission Weight  90 kg (198 lb 6.6 oz) Documented at 11/04/2020 2204        Intake & Output (last 3 days)       11/03 0701 - 11/04 0700 11/04 0701 - 11/05 0700 11/05 0701 - 11/06 0700 11/06 0701 - 11/07 0700    P.O.  240 0 0    I.V. (mL/kg)   2995 (33.3) 915 (10.2)    Total Intake(mL/kg)  240 (2.7) 2995 (33.3) 915 (10.2)    Urine (mL/kg/hr)  600 1100 (0.5) 200 (0.2)    Total Output  600 1100 200    Net  -360 +1895 +715                Lines, Drains & Airways    Active LDAs     Name:   Placement date:   Placement time:   Site:   Days:    Peripheral IV 11/05/20 0030 Anterior;Left;Proximal Forearm   11/05/20    0030    Forearm   less than 1                Physical Exam  Vitals signs and nursing note reviewed.   Constitutional:       General: She is not in acute distress.     Appearance: Normal appearance. She is well-developed. She is not toxic-appearing.   HENT:      Head: Normocephalic and atraumatic.      Nose: Nose normal. No congestion or rhinorrhea.      Mouth/Throat:      Mouth: Mucous membranes are moist.      Pharynx: No oropharyngeal exudate.   Eyes:      General:         Right eye: No discharge.      Extraocular Movements: Extraocular movements intact.      Conjunctiva/sclera: Conjunctivae normal.      Pupils: Pupils are equal, round, and reactive to light.   Neck:      Musculoskeletal: Normal range of motion and neck supple. No neck " rigidity or muscular tenderness.      Thyroid: No thyromegaly.      Vascular: No carotid bruit or JVD.      Trachea: No tracheal deviation.   Cardiovascular:      Rate and Rhythm: Normal rate and regular rhythm.      Pulses: Normal pulses.      Heart sounds: Normal heart sounds. No murmur. No friction rub.   Pulmonary:      Effort: Pulmonary effort is normal.      Breath sounds: Normal breath sounds.   Abdominal:      General: Bowel sounds are normal. There is no distension.      Palpations: Abdomen is soft. There is no mass.      Tenderness: There is abdominal tenderness. There is no guarding or rebound.      Hernia: No hernia is present.   Musculoskeletal: Normal range of motion.         General: No swelling, tenderness, deformity or signs of injury.      Right lower leg: No edema.      Left lower leg: No edema.   Lymphadenopathy:      Cervical: No cervical adenopathy.   Skin:     General: Skin is warm and dry.      Coloration: Skin is not jaundiced or pale.      Findings: No bruising, erythema or rash.   Neurological:      General: No focal deficit present.      Mental Status: She is alert and oriented to person, place, and time. Mental status is at baseline.      Cranial Nerves: No cranial nerve deficit.      Sensory: No sensory deficit.      Motor: No weakness or abnormal muscle tone.      Coordination: Coordination normal.   Psychiatric:         Mood and Affect: Mood normal.         Behavior: Behavior normal.         Thought Content: Thought content normal.         Judgment: Judgment normal.              Wounds (last 24 hours)      LDA Wound     Row Name 11/06/20 1629 11/06/20 1317 11/06/20 1307       Wound 11/06/20 1123 abdomen Incision    Wound - Properties Group Placement Date: 11/06/20  -LT Placement Time: 1123 -LT Location: abdomen  -LT Primary Wound Type: Incision  -LT    Dressing Appearance  dry;intact;open to air;no drainage  -JM  dry;intact;no drainage;open to air  -AV  dry;intact;no drainage;open to  air  -AV    Closure  Liquid skin adhesive  -JM  Liquid skin adhesive  -AV  Liquid skin adhesive  -AV    Retired Wound - Properties Group Date first assessed: 11/06/20 -LT Time first assessed: 1123 -LT Location: abdomen  -LT Primary Wound Type: Incision  -LT    Row Name 11/06/20 1251 11/06/20 1237 11/06/20 1222       Wound 11/06/20 1123 abdomen Incision    Wound - Properties Group Placement Date: 11/06/20  -LT Placement Time: 1123 -LT Location: abdomen  -LT Primary Wound Type: Incision  -LT    Dressing Appearance  dry;intact;no drainage;open to air  -AV  dry;intact;no drainage;open to air  -AV  dry;intact;no drainage;open to air  -AV    Closure  Liquid skin adhesive  -AV  Liquid skin adhesive  -AV  Liquid skin adhesive  -AV    Retired Wound - Properties Group Date first assessed: 11/06/20  -LT Time first assessed: 1123  -LT Location: abdomen  -LT Primary Wound Type: Incision  -LT    Row Name 11/06/20 1209             Wound 11/06/20 1123 abdomen Incision    Wound - Properties Group Placement Date: 11/06/20  -LT Placement Time: 1123 -LT Location: abdomen  -LT Primary Wound Type: Incision  -LT    Closure  Liquid skin adhesive skin affix x4  -LT      Retired Wound - Properties Group Date first assessed: 11/06/20  -LT Time first assessed: 1123 -LT Location: abdomen  -LT Primary Wound Type: Incision  -LT      User Key  (r) = Recorded By, (t) = Taken By, (c) = Cosigned By    Initials Name Provider Type    Yakelin Smith RN Registered Nurse    Tiffany Su RN Registered Nurse    Danielle Barrios LPN Licensed Nurse          Procedures:    Procedure(s):  CHOLECYSTECTOMY LAPAROSCOPIC INTRAOPERATIVE CHOLANGIOGRAM          Results Review:     I reviewed the patient's new clinical results.      Lab Results (last 24 hours)     Procedure Component Value Units Date/Time    Comprehensive Metabolic Panel [[id number]]  (Abnormal) Collected: 11/06/20 0157    Specimen: Blood Updated: 11/06/20 0225     Glucose 91  mg/dL      BUN 16 mg/dL      Creatinine 0.67 mg/dL      Sodium 137 mmol/L      Potassium 3.9 mmol/L      Comment: Slight hemolysis detected by analyzer. Results may be affected.        Chloride 103 mmol/L      CO2 22.0 mmol/L      Calcium 9.0 mg/dL      Total Protein 5.9 g/dL      Albumin 3.40 g/dL      ALT (SGPT) 50 U/L      AST (SGOT) 57 U/L      Alkaline Phosphatase 58 U/L      Total Bilirubin 0.5 mg/dL      eGFR Non African Amer 87 mL/min/1.73      Globulin 2.5 gm/dL      A/G Ratio 1.4 g/dL      BUN/Creatinine Ratio 23.9     Anion Gap 12.0 mmol/L     Narrative:      GFR Normal >60  Chronic Kidney Disease <60  Kidney Failure <15      Lipase [[id number]]  (Abnormal) Collected: 11/06/20 0157    Specimen: Blood Updated: 11/06/20 0225     Lipase 76 U/L     C-reactive Protein [[id number]]  (Abnormal) Collected: 11/06/20 0157    Specimen: Blood Updated: 11/06/20 0225     C-Reactive Protein 22.13 mg/dL     Ammonia [[id number]]  (Normal) Collected: 11/06/20 0157    Specimen: Blood Updated: 11/06/20 0224     Ammonia 20 umol/L     Protime-INR [[id number]]  (Abnormal) Collected: 11/06/20 0156    Specimen: Blood Updated: 11/06/20 0218     Protime 12.4 Seconds      INR 1.13    CBC & Differential [[id number]]  (Abnormal) Collected: 11/06/20 0157    Specimen: Blood Updated: 11/06/20 0204    Narrative:      The following orders were created for panel order CBC & Differential.  Procedure                               Abnormality         Status                     ---------                               -----------         ------                     CBC Auto Differential[[id number]]        Abnormal            Final result                 Please view results for these tests on the individual orders.    CBC Auto Differential [[id number]]  (Abnormal) Collected: 11/06/20 0157    Specimen: Blood Updated: 11/06/20 0204     WBC 11.80 10*3/mm3      RBC 3.32 10*6/mm3      Hemoglobin 10.8 g/dL      Hematocrit 32.0 %      MCV 96.6 fL      MCH 32.4  pg      MCHC 33.6 g/dL      RDW 13.5 %      RDW-SD 45.9 fl      MPV 8.4 fL      Platelets 170 10*3/mm3      Neutrophil % 75.9 %      Lymphocyte % 16.0 %      Monocyte % 6.3 %      Eosinophil % 0.4 %      Basophil % 1.4 %      Neutrophils, Absolute 9.00 10*3/mm3      Lymphocytes, Absolute 1.90 10*3/mm3      Monocytes, Absolute 0.70 10*3/mm3      Eosinophils, Absolute 0.00 10*3/mm3      Basophils, Absolute 0.20 10*3/mm3      nRBC 0.1 /100 WBC         No results found for: HGBA1C  Results from last 7 days   Lab Units 11/06/20  0156   INR  1.13*           Lab Results   Component Value Date    LIPASE 76 (H) 11/06/2020     Lab Results   Component Value Date    CHOL 175 11/05/2020    TRIG 153 (H) 11/05/2020    HDL 37 (L) 11/05/2020     (H) 11/05/2020       No results found for: INTRAOP, PREDX, FINALDX, COMDX    Microbiology Results (last 10 days)     Procedure Component Value - Date/Time    COVID PRE-OP / PRE-PROCEDURE SCREENING ORDER (NO ISOLATION) - Swab, Nasopharynx [[id number]]  (Normal) Collected: 11/05/20 1400    Lab Status: Final result Specimen: Swab from Nasopharynx Updated: 11/05/20 1533    Narrative:      The following orders were created for panel order COVID PRE-OP / PRE-PROCEDURE SCREENING ORDER (NO ISOLATION) - Swab, Nasopharynx.  Procedure                               Abnormality         Status                     ---------                               -----------         ------                     COVID-19,CEPHEID,COR/MERCEDEZ...[[id number]]  Normal              Final result                 Please view results for these tests on the individual orders.    COVID-19,CEPHEID,COR/MERCEDEZ/PAD IN-HOUSE(OR EMERGENT/ADD-ON),NP SWAB IN TRANSPORT MEDIA 3-4 HR TAT - Swab, Nasopharynx [[id number]]  (Normal) Collected: 11/05/20 1400    Lab Status: Final result Specimen: Swab from Nasopharynx Updated: 11/05/20 1533     COVID19 Not Detected    Narrative:      Fact sheet for providers:  https://www.fda.gov/media/211331/download     Fact sheet for patients: https://www.fda.gov/media/372934/download          ECG/EMG Results (most recent)     Procedure Component Value Units Date/Time    ECG 12 Lead [[id number]] Collected: 11/05/20 1546     Updated: 11/05/20 1548     QT Interval 402 ms     Narrative:      HEART RATE= 86  bpm  RR Interval= 700  ms  ID Interval= 129  ms  P Horizontal Axis= -20  deg  P Front Axis= 59  deg  QRSD Interval= 93  ms  QT Interval= 402  ms  QRS Axis= 80  deg  T Wave Axis= 42  deg  - BORDERLINE ECG -  Sinus rhythm  Probable left atrial enlargement  Low voltage, precordial leads  Electronically Signed By:   Date and Time of Study: 2020-11-05 15:46:40                    Mri Abdomen Wo Contrast Mrcp    Result Date: 11/5/2020   1. Acute calculus cholecystitis. 2. Fusiform dilation of the common bile duct, and mild dilation of intrahepatic bile ducts. No high-grade biliary stricture or choledocholithiasis is seen. 3. Normal appearance of the pancreatic duct. 4. Inflammatory changes centered within the pancreatic head and the second-third duodenal segments. FINDINGS suggest pancreatitis/duodenitis. These findings could be secondary to gallbladder inflammation, as well. 4. Hepatomegaly with diffuse hepatic steatosis. Presumed left hepatic cyst. 5. Lower lumbar spinal fusion.   Electronically Signed By-Dr. Tavia Domingo MD On:11/5/2020 12:02 PM This report was finalized on [id number] by Dr. Tavia Domingo MD.    Fl Cholangiogram Operative    Result Date: 11/6/2020  Intraoperative cholangiogram with fluoroscopy. Please refer to the procedure report for findings and recommendations.  Electronically Signed By-Dr. Tavia Domingo MD On:11/6/2020 12:27 PM This report was finalized on [id number] by Dr. Tavia Domingo MD.    Xr Chest Pa & Lateral    Result Date: 11/5/2020  No acute cardiopulmonary abnormality.  Electronically Signed By-Aristides Galeano On:11/5/2020 6:43 PM This report was  finalized on [id number] by  Aristides Galeano .          Xrays, labs reviewed personally by physician.    Medication Review:   I have reviewed the patient's current medication list      Scheduled Meds  levoFLOXacin, 750 mg, Intravenous, Q24H  metroNIDAZOLE, 500 mg, Intravenous, Q8H  nicotine, 1 patch, Transdermal, Nightly  pantoprazole, 40 mg, Intravenous, BID  sodium chloride, 10 mL, Intravenous, Q12H        Meds Infusions  lactated ringers, 125 mL/hr, Last Rate: Stopped (11/06/20 1226)        Meds PRN  •  acetaminophen **OR** acetaminophen **OR** acetaminophen  •  aluminum-magnesium hydroxide-simethicone  •  bisacodyl  •  magnesium hydroxide  •  melatonin  •  ondansetron **OR** ondansetron  •  sodium chloride        Assessment/Plan   Assessment/Plan     Active Hospital Problems    Diagnosis  POA   • **Calculus of bile duct with acute cholecystitis and obstruction [K80.43]  Yes   • Acute biliary pancreatitis without infection or necrosis [K85.10]  Yes   • PAD (peripheral artery disease) (CMS/HCC) [I73.9]  Unknown   • Hyperlipidemia [E78.5]  Yes   • Hypertension [I10]  Yes   • Class 1 obesity due to excess calories in adult [E66.09]  Unknown   • Tobacco dependence syndrome [F17.200]  Yes      Resolved Hospital Problems   No resolved problems to display.       MEDICAL DECISION MAKING COMPLEXITY BY PROBLEM:   Acute Choledocholithiasis with acute cholecystitis and acute pancreatitis:  - Likely related to age, obesity and poor diet  - IVFs, NPO at midnight, and pain management ordered  - Surgery consulted by transferring ED >CHOLECYSTECTOMY LAPAROSCOPIC INTRAOPERATIVE CHOLANGIOGRAM  - Also consulted GI for possible ERCP     HTN: patient is unsure of her home medications and will need to verify with pharmacy in the AM. Holding medications and will monitor BP trends; PRN medications will be ordered if she is uncontrolled     HLD: Same as above. Lipid panel ordered with AM labs.      PAD s/p angioplasty and stent in  Right leg: Patient reports that she has complete occlusion in left leg and is suppose to have a peripheral bypass surgery of both legs but she has not scheduled it. Smoking cessation discussed and encouraged. Holding aspirin for possible surgeries.      Obesity: BMI 34.06; lifestyle modifications discussed and encouraged.     Tobacco use: smoking cessation discussed and encouraged. PRN nicotine patch ordered.      VTE Prophylaxis -   Mechanical Order History:      Ordered        11/04/20 2323  Place Sequential Compression Device  Once         11/04/20 2323  Maintain Sequential Compression Device  Continuous         Signed and Held  SCD (Sequential Compression Device) - Place on Patient in Pre-Op  Once                 Pharmalogical Order History:     None            Code Status -   Code Status and Medical Interventions:   Ordered at: 11/04/20 2323     Code Status:    CPR     Medical Interventions (Level of Support Prior to Arrest):    Full       This patient has been examined wearing appropriate Personal Protective Equipment and discussed with surgery. 11/06/20        Discharge Planning  home          Electronically signed by Zane Burrell MD, 11/06/20, 17:59 EST.  [facility] Hospitalist Team

## 2020-11-06 NOTE — ANESTHESIA PREPROCEDURE EVALUATION
Anesthesia Evaluation     Patient summary reviewed and Nursing notes reviewed   no history of anesthetic complications:  NPO Solid Status: > 8 hours  NPO Liquid Status: > 8 hours           Airway   Dental      Pulmonary    (+) a smoker Current,   Cardiovascular     ECG reviewed  PT is on anticoagulation therapy  Patient on routine beta blocker    (+) hypertension, CAD, PVD, hyperlipidemia,       Neuro/Psych  GI/Hepatic/Renal/Endo    (+) obesity,  GERD,      Musculoskeletal     (+) chronic pain,   Abdominal    Substance History      OB/GYN          Other   arthritis,      ROS/Med Hx Other: Acute pancreatitis, claudication, biliary stone    PSH  ROTATOR CUFF REPAIR APPENDECTOMY  HYSTERECTOMY BACK SURGERY  CARDIAC CATHETERIZATION ANGIOPLASTY                  Anesthesia Plan    ASA 3     general   (Patient identified; pre-operative vital signs, all relevant labs/studies, complete medical/surgical/anesthetic history, full medication list, full allergy list, and NPO status obtained/reviewed; physical assessment performed; anesthetic options, side effects, potential complications, risks, and benefits discussed; questions answered; written anesthesia consent obtained; patient cleared for procedure; anesthesia machine and equipment checked and functioning)  intravenous induction     Anesthetic plan, all risks, benefits, and alternatives have been provided, discussed and informed consent has been obtained with: patient.

## 2020-11-06 NOTE — ANESTHESIA POSTPROCEDURE EVALUATION
Patient: Radha Chang    Procedure Summary     Date: 11/06/20 Room / Location: [facility] OR 06 / [facility] MAIN OR    Anesthesia Start: 1054 Anesthesia Stop: 1226    Procedure: CHOLECYSTECTOMY LAPAROSCOPIC INTRAOPERATIVE CHOLANGIOGRAM (N/A Abdomen) Diagnosis:       Acute biliary pancreatitis without infection or necrosis      (Acute biliary pancreatitis without infection or necrosis [K85.10])    Surgeon: Dipesh Rodrigues MD Provider: Brandon Gould MD    Anesthesia Type: general ASA Status: 3          Anesthesia Type: general    Vitals  Vitals Value Taken Time   /42 11/06/20 1309   Temp 98.2 °F (36.8 °C) 11/06/20 1222   Pulse 94 11/06/20 1311   Resp 14 11/06/20 1251   SpO2 95 % 11/06/20 1311   Vitals shown include unvalidated device data.        Post Anesthesia Care and Evaluation    Patient location during evaluation: PACU  Patient participation: complete - patient participated  Level of consciousness: awake  Pain scale: See nurse's notes for pain score.  Pain management: adequate  Airway patency: patent  Anesthetic complications: No anesthetic complications  PONV Status: none  Cardiovascular status: acceptable  Respiratory status: acceptable  Hydration status: acceptable    Comments: Patient seen and examined postoperatively; vital signs stable; SpO2 greater than or equal to 90%; cardiopulmonary status stable; nausea/vomiting adequately controlled; pain adequately controlled; no apparent anesthesia complications; patient discharged from anesthesia care when discharge criteria were met

## 2020-11-07 VITALS
DIASTOLIC BLOOD PRESSURE: 57 MMHG | HEIGHT: 64 IN | HEART RATE: 89 BPM | WEIGHT: 198.41 LBS | RESPIRATION RATE: 16 BRPM | OXYGEN SATURATION: 92 % | BODY MASS INDEX: 33.87 KG/M2 | TEMPERATURE: 98.4 F | SYSTOLIC BLOOD PRESSURE: 136 MMHG

## 2020-11-07 LAB
ALBUMIN SERPL-MCNC: 3.2 G/DL (ref 3.5–5.2)
ALBUMIN/GLOB SERPL: 1.1 G/DL
ALP SERPL-CCNC: 64 U/L (ref 39–117)
ALT SERPL W P-5'-P-CCNC: 49 U/L (ref 1–33)
ANION GAP SERPL CALCULATED.3IONS-SCNC: 11 MMOL/L (ref 5–15)
AST SERPL-CCNC: 76 U/L (ref 1–32)
BASOPHILS # BLD AUTO: 0 10*3/MM3 (ref 0–0.2)
BASOPHILS NFR BLD AUTO: 0.3 % (ref 0–1.5)
BILIRUB SERPL-MCNC: 0.5 MG/DL (ref 0–1.2)
BUN SERPL-MCNC: 11 MG/DL (ref 8–23)
BUN/CREAT SERPL: 20.4 (ref 7–25)
CALCIUM SPEC-SCNC: 8.9 MG/DL (ref 8.6–10.5)
CHLORIDE SERPL-SCNC: 104 MMOL/L (ref 98–107)
CO2 SERPL-SCNC: 23 MMOL/L (ref 22–29)
CREAT SERPL-MCNC: 0.54 MG/DL (ref 0.57–1)
CRP SERPL-MCNC: 25.04 MG/DL (ref 0–0.5)
DEPRECATED RDW RBC AUTO: 47.3 FL (ref 37–54)
EOSINOPHIL # BLD AUTO: 0 10*3/MM3 (ref 0–0.4)
EOSINOPHIL NFR BLD AUTO: 0.4 % (ref 0.3–6.2)
ERYTHROCYTE [DISTWIDTH] IN BLOOD BY AUTOMATED COUNT: 13.8 % (ref 12.3–15.4)
GFR SERPL CREATININE-BSD FRML MDRD: 111 ML/MIN/1.73
GLOBULIN UR ELPH-MCNC: 2.8 GM/DL
GLUCOSE SERPL-MCNC: 113 MG/DL (ref 65–99)
HCT VFR BLD AUTO: 30.7 % (ref 34–46.6)
HGB BLD-MCNC: 10.4 G/DL (ref 12–15.9)
LIPASE SERPL-CCNC: 30 U/L (ref 13–60)
LYMPHOCYTES # BLD AUTO: 1.4 10*3/MM3 (ref 0.7–3.1)
LYMPHOCYTES NFR BLD AUTO: 14.7 % (ref 19.6–45.3)
MCH RBC QN AUTO: 33 PG (ref 26.6–33)
MCHC RBC AUTO-ENTMCNC: 33.8 G/DL (ref 31.5–35.7)
MCV RBC AUTO: 97.9 FL (ref 79–97)
MONOCYTES # BLD AUTO: 0.8 10*3/MM3 (ref 0.1–0.9)
MONOCYTES NFR BLD AUTO: 8 % (ref 5–12)
NEUTROPHILS NFR BLD AUTO: 7.4 10*3/MM3 (ref 1.7–7)
NEUTROPHILS NFR BLD AUTO: 76.6 % (ref 42.7–76)
NRBC BLD AUTO-RTO: 0 /100 WBC (ref 0–0.2)
PLATELET # BLD AUTO: 159 10*3/MM3 (ref 140–450)
PMV BLD AUTO: 8.9 FL (ref 6–12)
POTASSIUM SERPL-SCNC: 3.6 MMOL/L (ref 3.5–5.2)
PROT SERPL-MCNC: 6 G/DL (ref 6–8.5)
RBC # BLD AUTO: 3.14 10*6/MM3 (ref 3.77–5.28)
SODIUM SERPL-SCNC: 138 MMOL/L (ref 136–145)
WBC # BLD AUTO: 9.7 10*3/MM3 (ref 3.4–10.8)

## 2020-11-07 PROCEDURE — 80053 COMPREHEN METABOLIC PANEL: CPT | Performed by: SURGERY

## 2020-11-07 PROCEDURE — 83690 ASSAY OF LIPASE: CPT | Performed by: SURGERY

## 2020-11-07 PROCEDURE — 85025 COMPLETE CBC W/AUTO DIFF WBC: CPT | Performed by: SURGERY

## 2020-11-07 PROCEDURE — 86140 C-REACTIVE PROTEIN: CPT | Performed by: SURGERY

## 2020-11-07 PROCEDURE — 99239 HOSP IP/OBS DSCHRG MGMT >30: CPT | Performed by: INTERNAL MEDICINE

## 2020-11-07 RX ADMIN — PANTOPRAZOLE SODIUM 40 MG: 40 INJECTION, POWDER, FOR SOLUTION INTRAVENOUS at 09:05

## 2020-11-07 RX ADMIN — Medication 10 ML: at 12:24

## 2020-11-07 RX ADMIN — HYDROCODONE BITARTRATE AND ACETAMINOPHEN 1 TABLET: 10; 325 TABLET ORAL at 03:27

## 2020-11-07 RX ADMIN — METRONIDAZOLE 500 MG: 500 INJECTION, SOLUTION INTRAVENOUS at 09:05

## 2020-11-07 RX ADMIN — METRONIDAZOLE 500 MG: 500 INJECTION, SOLUTION INTRAVENOUS at 02:00

## 2020-11-07 RX ADMIN — SODIUM CHLORIDE, POTASSIUM CHLORIDE, SODIUM LACTATE AND CALCIUM CHLORIDE 125 ML/HR: 600; 310; 30; 20 INJECTION, SOLUTION INTRAVENOUS at 01:22

## 2020-11-07 RX ADMIN — HYDROCODONE BITARTRATE AND ACETAMINOPHEN 1 TABLET: 10; 325 TABLET ORAL at 09:04

## 2020-11-07 NOTE — PROGRESS NOTES
LOS: 3 days   Patient Care Team:  Christian Salvador MD as PCP - General (Family Medicine)    Reason for follow-up: Postop    Subjective   Patient seen and examined.  Ready to go home.  No nausea or vomiting.  Main complaint is back pain from chronic issues    Objective   Visions are all clean dry and intact without infection    Vital Signs  Vitals:    11/06/20 2100 11/07/20 0100 11/07/20 0251 11/07/20 0500   BP: 170/64 157/67  136/57   BP Location: Left arm Left arm  Left arm   Patient Position: Lying Lying  Lying   Pulse: 101   89   Resp: 16   16   Temp: 99.3 °F (37.4 °C)  98.3 °F (36.8 °C) 98.4 °F (36.9 °C)   TempSrc: Oral  Oral Oral   SpO2: 96%   92%   Weight:       Height:             Results Review:       Lab Results (last 24 hours)     Procedure Component Value Units Date/Time    Comprehensive Metabolic Panel [[id number]]  (Abnormal) Collected: 11/07/20 0216    Specimen: Blood Updated: 11/07/20 0318     Glucose 113 mg/dL      BUN 11 mg/dL      Creatinine 0.54 mg/dL      Sodium 138 mmol/L      Potassium 3.6 mmol/L      Chloride 104 mmol/L      CO2 23.0 mmol/L      Calcium 8.9 mg/dL      Total Protein 6.0 g/dL      Albumin 3.20 g/dL      ALT (SGPT) 49 U/L      AST (SGOT) 76 U/L      Alkaline Phosphatase 64 U/L      Total Bilirubin 0.5 mg/dL      eGFR Non African Amer 111 mL/min/1.73      Globulin 2.8 gm/dL      A/G Ratio 1.1 g/dL      BUN/Creatinine Ratio 20.4     Anion Gap 11.0 mmol/L     Narrative:      GFR Normal >60  Chronic Kidney Disease <60  Kidney Failure <15      C-reactive Protein [[id number]]  (Abnormal) Collected: 11/07/20 0216    Specimen: Blood Updated: 11/07/20 0318     C-Reactive Protein 25.04 mg/dL     Lipase [[id number]]  (Normal) Collected: 11/07/20 0216    Specimen: Blood Updated: 11/07/20 0318     Lipase 30 U/L     CBC & Differential [[id number]]  (Abnormal) Collected: 11/07/20 0216    Specimen: Blood Updated: 11/07/20 0252    Narrative:      The following orders were created for panel  order CBC & Differential.  Procedure                               Abnormality         Status                     ---------                               -----------         ------                     CBC Auto Differential[[id number]]        Abnormal            Final result                 Please view results for these tests on the individual orders.    CBC Auto Differential [[id number]]  (Abnormal) Collected: 11/07/20 0216    Specimen: Blood Updated: 11/07/20 0252     WBC 9.70 10*3/mm3      RBC 3.14 10*6/mm3      Hemoglobin 10.4 g/dL      Hematocrit 30.7 %      MCV 97.9 fL      MCH 33.0 pg      MCHC 33.8 g/dL      RDW 13.8 %      RDW-SD 47.3 fl      MPV 8.9 fL      Platelets 159 10*3/mm3      Neutrophil % 76.6 %      Lymphocyte % 14.7 %      Monocyte % 8.0 %      Eosinophil % 0.4 %      Basophil % 0.3 %      Neutrophils, Absolute 7.40 10*3/mm3      Lymphocytes, Absolute 1.40 10*3/mm3      Monocytes, Absolute 0.80 10*3/mm3      Eosinophils, Absolute 0.00 10*3/mm3      Basophils, Absolute 0.00 10*3/mm3      nRBC 0.0 /100 WBC            Imaging Results (Last 24 Hours)     Procedure Component Value Units Date/Time    FL Cholangiogram Operative [[id number]] Collected: 11/06/20 1225     Updated: 11/06/20 1229    Narrative:      DATE OF EXAM:  11/6/2020 11:40 AM     PROCEDURE:  FL CHOLANGIOGRAM OPERATIVE-     INDICATIONS:  CHOLECYSTECTOMY LAPAROSCOPIC INTRAOPERATIVE CHOLANGIOGRAM;  K85.10-Biliary acute pancreatitis without necrosis or infection;  Z00.6-Encounter for examination for normal comparison and control in  clinical research program     COMPARISON:  MRI abdomen, MRCP 11/05/2020.     TECHNIQUE:   Digital spot images were obtained from an intraoperative cholangiogram  procedure performed by the surgeon.      Fluoroscopic time:   37.9 seconds     FINDINGS:  3 spot fluoroscopic series images were obtained during intraoperative  cholangiogram. Contrast was injected into the cystic duct stump.  Informed dilation of the  CBD. No high-grade CBD stricture is identified.  No definite intraluminal filling defects are identified on the submitted  images. Contrast fills the duodenum.        Impression:      Intraoperative cholangiogram with fluoroscopy. Please refer to the  procedure report for findings and recommendations.     Electronically Signed By-Dr. Tavia Domingo MD On:11/6/2020 12:27 PM  This report was finalized on [id number] by Dr. Tavia Domingo MD.          Medication Review:   Current Facility-Administered Medications:   •  acetaminophen (TYLENOL) tablet 650 mg, 650 mg, Oral, Q4H PRN, 650 mg at 11/06/20 0230 **OR** acetaminophen (TYLENOL) 160 MG/5ML solution 650 mg, 650 mg, Oral, Q4H PRN **OR** acetaminophen (TYLENOL) suppository 650 mg, 650 mg, Rectal, Q4H PRN, Dipesh Rodrigues MD  •  aluminum-magnesium hydroxide-simethicone (MAALOX MAX) 400-400-40 MG/5ML suspension 15 mL, 15 mL, Oral, Q6H PRN, Dipesh Rodrigues MD  •  bisacodyl (DULCOLAX) suppository 10 mg, 10 mg, Rectal, Daily PRN, Dipesh Rodrigues MD  •  HYDROcodone-acetaminophen (NORCO)  MG per tablet 1 tablet, 1 tablet, Oral, Q4H PRN, Randy Marmolejo PA-C, 1 tablet at 11/07/20 0904  •  lactated ringers infusion, 125 mL/hr, Intravenous, Continuous, Dipesh Rodrigues MD, Last Rate: 125 mL/hr at 11/07/20 0122, 125 mL/hr at 11/07/20 0122  •  levoFLOXacin (LEVAQUIN) 750 mg/150 mL D5W (premix) (LEVAQUIN) 750 mg, 750 mg, Intravenous, Q24H, Dipesh Rodrigues MD, Last Rate: 0 mL/hr at 11/05/20 1930, 750 mg at 11/06/20 1635  •  magnesium hydroxide (MILK OF MAGNESIA) suspension 2400 mg/10mL 10 mL, 10 mL, Oral, Daily PRN, Dipesh Rodrigues MD  •  melatonin tablet 5 mg, 5 mg, Oral, Nightly PRN, Dipesh Rodrigues MD  •  metroNIDAZOLE (FLAGYL) 500 mg/100mL IVPB, 500 mg, Intravenous, Q8H, Dipesh Rodrigues MD, Last Rate: 0 mL/hr at 11/06/20 0150, 500 mg at 11/07/20 0905  •  nicotine (NICODERM CQ) 21 MG/24HR patch 1 patch, 1 patch, Transdermal, Nightly, Dipesh Rodrigues MD, 1  patch at 11/05/20 2023  •  ondansetron (ZOFRAN) tablet 4 mg, 4 mg, Oral, Q6H PRN **OR** ondansetron (ZOFRAN) injection 4 mg, 4 mg, Intravenous, Q6H PRN, Dipesh Rodrigues MD  •  pantoprazole (PROTONIX) injection 40 mg, 40 mg, Intravenous, BID, Dipesh Rodrigues MD, 40 mg at 11/07/20 0905  •  sodium chloride 0.9 % flush 10 mL, 10 mL, Intravenous, Q12H, Dipesh Rodrigues MD, 10 mL at 11/06/20 2031  •  sodium chloride 0.9 % flush 10 mL, 10 mL, Intravenous, PRN, Dipesh Rodrigues MD    Assessment/Plan         Calculus of bile duct with acute cholecystitis and obstruction    Hyperlipidemia    Hypertension    Tobacco dependence syndrome    Acute biliary pancreatitis without infection or necrosis    PAD (peripheral artery disease) (CMS/HCC)    Class 1 obesity due to excess calories in adult      Impression: Postop day #1 lap julia for gallstone pancreatitis    Plan: Okay to go home today.  See Dr. Rodrigues in the office in 2 weeks.        Az Pablo DO  11/07/20  11:06 EST

## 2020-11-07 NOTE — DISCHARGE SUMMARY
[facility] Medicine Services  DISCHARGE SUMMARY        Prepared For PCP:  Christian Salvador MD    Patient Name: Radha Chang  : 1950  MRN: [id number]      Date of Admission:   2020    Date of Discharge:  2020    Length of stay:  LOS: 3 days     Hospital Course     Presenting Problem:   Calculus of bile duct with acute cholecystitis and obstruction [K80.43]      Active Hospital Problems    Diagnosis  POA   • **Calculus of bile duct with acute cholecystitis and obstruction [K80.43]  Yes   • Acute biliary pancreatitis without infection or necrosis [K85.10]  Yes   • PAD (peripheral artery disease) (CMS/HCC) [I73.9]  Unknown   • Hyperlipidemia [E78.5]  Yes   • Hypertension [I10]  Yes   • Class 1 obesity due to excess calories in adult [E66.09]  Unknown   • Tobacco dependence syndrome [F17.200]  Yes      Resolved Hospital Problems   No resolved problems to display.     Acute Choledocholithiasis with acute cholecystitis and acute pancreatitis:  - Likely related to age, obesity and poor diet  - Surgery consulted >CHOLECYSTECTOMY LAPAROSCOPIC INTRAOPERATIVE CHOLANGIOGRAM  - Also consulted GI for possible ERCP     HTN: patient is unsure of her home medications and will need to verify with pharmacy in the AM. Holding medications and will monitor BP trends; PRN medications will be ordered if she is uncontrolled     HLD: Same as above. Lipid panel ordered with AM labs.      PAD s/p angioplasty and stent in Right leg: Patient reports that she has complete occlusion in left leg and is suppose to have a peripheral bypass surgery of both legs but she has not scheduled it. Smoking cessation discussed and encouraged. Holding aspirin for possible surgeries.      Obesity: BMI 34.06; lifestyle modifications discussed and encouraged.     Tobacco use: smoking cessation discussed and encouraged. PRN nicotine patch ordered      Hospital Course:  Radha Chang is a 70 y.o. female   presented to [facility] complaining of constant, abdominal pain that woke the patient up on the AM of 11/4/2020 at 0545 without any associated with nausea and decreased appetite. Nothing makes it better and movement makes it worse. Patient reports that the evening before she ate fast food which is abnormal for her. She denies ever having any pain like this before. The pain is diffusely located bit more concentrated in the RUQ and epigastric region. Patient denies any fever, chills, chest pain, SOA, cough, sputum, peripheral edema, dysuria, or recent travel/sick contacts/hospitalizations.      Documentation, labs, and imaging from [facility] ED reviewed and patient noted to have normal renal function, elevated  and ALT 99, Tbili 1.7, elevated WBC 13, significantly elevated lipase 1215 and amylase 814. On CT A/P W patient was noted to have distended and thickened wall gall bladder that is consistent with acute cholecystitis, peripancreatic fat stranding consistent with acute pancreatitis, and biliary ductal dilation and mild distention of distal pancreatic duct consistent with choledocholithiasis.       Date:  11/5/20: abd pain    11/6/20-s/p CHOLECYSTECTOMY LAPAROSCOPIC INTRAOPERATIVE CHOLANGIOGRAM, doing well after surgery.  11/7/20-doing better today, will dc home      Day of Discharge     HPI:       Vital Signs:   Temp:  [97.8 °F (36.6 °C)-99.3 °F (37.4 °C)] 98.4 °F (36.9 °C)  Heart Rate:  [] 89  Resp:  [14-21] 16  BP: (117-170)/(39-71) 136/57       Physical Exam  Vitals signs and nursing note reviewed.   Constitutional:       Appearance: Normal appearance. She is well-developed.   HENT:      Head: Normocephalic and atraumatic.      Nose: Nose normal. No congestion or rhinorrhea.      Mouth/Throat:      Mouth: Mucous membranes are moist.      Pharynx: No oropharyngeal exudate.   Eyes:      General: No scleral icterus.     Extraocular Movements: Extraocular movements intact.       Conjunctiva/sclera: Conjunctivae normal.      Pupils: Pupils are equal, round, and reactive to light.   Neck:      Musculoskeletal: Normal range of motion and neck supple. No muscular tenderness.      Thyroid: No thyromegaly.      Vascular: No carotid bruit or JVD.      Trachea: No tracheal deviation.   Cardiovascular:      Rate and Rhythm: Normal rate and regular rhythm.      Pulses: Normal pulses.      Heart sounds: Normal heart sounds. No friction rub.   Pulmonary:      Effort: Pulmonary effort is normal.      Breath sounds: Normal breath sounds. No stridor. No rhonchi.   Chest:      Chest wall: No tenderness.   Abdominal:      General: Bowel sounds are normal.      Palpations: Abdomen is soft. There is no mass.      Tenderness: There is no rebound.      Hernia: No hernia is present.   Musculoskeletal: Normal range of motion.         General: No swelling, tenderness, deformity or signs of injury.      Right lower leg: No edema.      Left lower leg: No edema.   Skin:     General: Skin is warm and dry.      Coloration: Skin is not jaundiced or pale.      Findings: No bruising, erythema or rash.   Neurological:      General: No focal deficit present.      Mental Status: She is alert and oriented to person, place, and time. Mental status is at baseline.      Cranial Nerves: No cranial nerve deficit.      Sensory: No sensory deficit.      Motor: No weakness or abnormal muscle tone.      Coordination: Coordination normal.   Psychiatric:         Mood and Affect: Mood normal.         Behavior: Behavior normal.         Thought Content: Thought content normal.         Judgment: Judgment normal.         Pertinent  and/or Most Recent Results     Results from last 7 days   Lab Units 11/07/20  0216 11/06/20  0157 11/05/20  0227   WBC 10*3/mm3 9.70 11.80* 10.40   HEMOGLOBIN g/dL 10.4* 10.8* 12.1   HEMATOCRIT % 30.7* 32.0* 35.3   PLATELETS 10*3/mm3 159 170 196   SODIUM mmol/L 138 137 138   POTASSIUM mmol/L 3.6 3.9 4.3   CHLORIDE  mmol/L 104 103 105   CO2 mmol/L 23.0 22.0 26.0   BUN mg/dL 11 16 17   CREATININE mg/dL 0.54* 0.67 0.71   GLUCOSE mg/dL 113* 91 112*   CALCIUM mg/dL 8.9 9.0 9.7     Results from last 7 days   Lab Units 11/07/20  0216 11/06/20  0157 11/06/20  0156 11/05/20  0227   BILIRUBIN mg/dL 0.5 0.5  --  0.6   ALK PHOS U/L 64 58  --  60   ALT (SGPT) U/L 49* 50*  --  82*   AST (SGOT) U/L 76* 57*  --  124*   PROTIME Seconds  --   --  12.4*  --    INR   --   --  1.13*  --      Results from last 7 days   Lab Units 11/05/20 0227   CHOLESTEROL mg/dL 175   TRIGLYCERIDES mg/dL 153*   HDL CHOL mg/dL 37*           Brief Urine Lab Results     None          Microbiology Results Abnormal     Procedure Component Value - Date/Time    COVID PRE-OP / PRE-PROCEDURE SCREENING ORDER (NO ISOLATION) - Swab, Nasopharynx [[id number]]  (Normal) Collected: 11/05/20 1400    Lab Status: Final result Specimen: Swab from Nasopharynx Updated: 11/05/20 1533    Narrative:      The following orders were created for panel order COVID PRE-OP / PRE-PROCEDURE SCREENING ORDER (NO ISOLATION) - Swab, Nasopharynx.  Procedure                               Abnormality         Status                     ---------                               -----------         ------                     COVID-19,CEPHEID,COR/MERCEDEZ...[[id number]]  Normal              Final result                 Please view results for these tests on the individual orders.    COVID-19,CEPHEID,COR/MERCEDEZ/PAD IN-HOUSE(OR EMERGENT/ADD-ON),NP SWAB IN TRANSPORT MEDIA 3-4 HR TAT - Swab, Nasopharynx [[id number]]  (Normal) Collected: 11/05/20 1400    Lab Status: Final result Specimen: Swab from Nasopharynx Updated: 11/05/20 1533     COVID19 Not Detected    Narrative:      Fact sheet for providers: https://www.fda.gov/media/546036/download     Fact sheet for patients: https://www.fda.gov/media/721975/download          Mri Abdomen Wo Contrast Mrcp    Result Date: 11/5/2020  Impression:  1. Acute calculus cholecystitis. 2.  Fusiform dilation of the common bile duct, and mild dilation of intrahepatic bile ducts. No high-grade biliary stricture or choledocholithiasis is seen. 3. Normal appearance of the pancreatic duct. 4. Inflammatory changes centered within the pancreatic head and the second-third duodenal segments. FINDINGS suggest pancreatitis/duodenitis. These findings could be secondary to gallbladder inflammation, as well. 4. Hepatomegaly with diffuse hepatic steatosis. Presumed left hepatic cyst. 5. Lower lumbar spinal fusion.   Electronically Signed By-Dr. Tavia Domingo MD On:11/5/2020 12:02 PM This report was finalized on [id number] by Dr. Tavia Domingo MD.    Fl Cholangiogram Operative    Result Date: 11/6/2020  Impression: Intraoperative cholangiogram with fluoroscopy. Please refer to the procedure report for findings and recommendations.  Electronically Signed By-Dr. Tavia Domingo MD On:11/6/2020 12:27 PM This report was finalized on [id number] by Dr. Tavia Domingo MD.    Xr Chest Pa & Lateral    Result Date: 11/5/2020  Impression: No acute cardiopulmonary abnormality.  Electronically Signed By-Aristides Galeano On:11/5/2020 6:43 PM This report was finalized on [id number] by  Aristides Galeano, .                             Test Results Pending at Discharge  Pending Labs     Order Current Status    Tissue Pathology Exam Collected (11/06/20 1128)            Procedures Performed  Procedure(s):  CHOLECYSTECTOMY LAPAROSCOPIC INTRAOPERATIVE CHOLANGIOGRAM         Consults:   Consults     Date and Time Order Name Status Description    11/4/2020 0441 Inpatient Gastroenterology Consult Completed     11/4/2020 2326 Inpatient General Surgery Consult Completed             Discharge Details        Discharge Medications      Continue These Medications      Instructions Start Date   aspirin 81 MG chewable tablet   81 mg, Oral, Daily      atenolol 25 MG tablet  Commonly known as: TENORMIN   60 mg, Oral, Daily      fenofibrate  145 MG tablet  Commonly known as: TRICOR   160 mg, Oral, Daily      HYDROcodone-acetaminophen  MG per tablet  Commonly known as: NORCO   1 tablet, Oral, Every 4 Hours PRN      omeprazole 20 MG capsule  Commonly known as: priLOSEC   20 mg, Oral, 2 times daily      simvastatin 40 MG tablet  Commonly known as: ZOCOR   40 mg, Oral, Nightly         Stop These Medications    cyclobenzaprine 10 MG tablet  Commonly known as: FLEXERIL            Allergies   Allergen Reactions   • Niacin Itching   • Kefzol [Cefazolin] Itching         Discharge Disposition:  Home or Self Care    Diet:  Hospital:  Diet Order   Procedures   • Diet Liquids; Clear Liquid         Discharge Activity:   Activity Instructions     Gradually Increase Activity Until at Pre-Hospitalization Level              CODE STATUS:    Code Status and Medical Interventions:   Ordered at: 11/04/20 2323     Code Status:    CPR     Medical Interventions (Level of Support Prior to Arrest):    Full         Follow-up Appointments  No future appointments.          Condition on Discharge:      Stable      This patient has been examined wearing appropriate Personal Protective Equipment and discussed with rn. 11/07/20      Electronically signed by Zane Burrell MD, 11/07/20, 11:41 AM EST.      Time: I spent  34  minutes on this discharge activity which included face-to-face encounter with the patient/reviewing the data in the system/coordination of the care with the nursing staff as well as consultants/documentation/entering orders.

## 2020-11-07 NOTE — PLAN OF CARE
Goal Outcome Evaluation:  Plan of Care Reviewed With: patient  Progress: improving  Outcome Summary: patient alert and oriented, ambulates by self. Has not complained of her abdomen, punctures intact, clean, no drainage. She has chronic back pain which has been her main issue tonight. repostioning and warm blankets on her right hip with norco 10s has helped. Vitals stable as well. Will continue to monitor.

## 2020-11-08 ENCOUNTER — READMISSION MANAGEMENT (OUTPATIENT)
Dept: CALL CENTER | Facility: HOSPITAL | Age: 70
End: 2020-11-08

## 2020-11-08 NOTE — OUTREACH NOTE
Prep Survey      Responses   [facility] discharged from?  [facility]   Is LACE score < 7 ?  No   Eligibility  Readm Mgmt   Discharge diagnosis  cholecystectomy   Does the patient have one of the following disease processes/diagnoses(primary or secondary)?  General Surgery   Does the patient have Home health ordered?  No   Is there a DME ordered?  No   Comments regarding appointments  f/u apmts needed   Medication alerts for this patient  stop flexeril   Prep survey completed?  Yes          Soheila Soto RN

## 2020-11-09 NOTE — PROGRESS NOTES
Case Management Discharge Note      Final Note: Home    Provided Post Acute Provider List?: N/A  N/A Provider List Comment: patient denies any needs for discharge    Selected Continued Care - Discharged on 11/7/2020 Admission date: 11/4/2020 - Discharge disposition: Home or Self Care              Final Discharge Disposition Code: 01 - home or self-care

## 2020-11-10 LAB
LAB AP CASE REPORT: NORMAL
PATH REPORT.FINAL DX SPEC: NORMAL
PATH REPORT.GROSS SPEC: NORMAL

## 2020-11-11 ENCOUNTER — READMISSION MANAGEMENT (OUTPATIENT)
Dept: CALL CENTER | Facility: HOSPITAL | Age: 70
End: 2020-11-11

## 2020-11-11 NOTE — OUTREACH NOTE
General Surgery Week 1 Survey      Responses   [facility] patient discharged from?  [facility]   Does the patient have one of the following disease processes/diagnoses(primary or secondary)?  General Surgery   Week 1 attempt successful?  Yes   Call start time  1538   Call end time  1544   Is patient permission given to speak with other caregiver?  Yes   List who call center can speak with  spouse   Medication alerts for this patient  Pt's pcp has put her back on muscle relaxer.   Meds reviewed with patient/caregiver?  Yes   Is the patient having any side effects they believe may be caused by any medication additions or changes?  No   Does the patient have all medications related to this admission filled (includes all antibiotics, pain medications, etc.)  Yes   Does the patient have a follow up appointment scheduled with their surgeon?  Yes   Has the patient kept scheduled appointments due by today?  Yes   Has home health visited the patient within 72 hours of discharge?  N/A   Psychosocial issues?  No   Did the patient receive a copy of their discharge instructions?  Yes   Nursing interventions  Reviewed instructions with patient   What is the patient's perception of their health status since discharge?  Improving   Nursing interventions  Nurse provided patient education   Is the patient /caregiver able to teach back basic post-op care?  Continue use of incentive spirometry at least 1 week post discharge, Practice 'cough and deep breath', Drive as instructed by MD in discharge instructions, Take showers only when approved by MD-sponge bathe until then, No tub bath, swimming, or hot tub until instructed by MD, Keep incision areas clean,dry and protected, Do not remove steri-strips, Lifting as instructed by MD in discharge instructions   Is the patient/caregiver able to teach back signs and symptoms of incisional infection?  Increased redness, swelling or pain at the incisonal site, Increased drainage or bleeding,  Incisional warmth, Pus or odor from incision, Fever   Is the patient/caregiver able to teach back steps to recovery at home?  Set small, achievable goals for return to baseline health, Rest and rebuild strength, gradually increase activity   Is the patient/caregiver able to teach back the hierarchy of who to call/visit for symptoms/problems? PCP, Specialist, Home health nurse, Urgent Care, ED, 911  Yes   Additional teach back comments  Pt doing very well with no complications.   Week 1 call completed?  Yes          Savannah Liu RN

## 2020-11-14 LAB — QT INTERVAL: 402 MS

## 2020-11-18 ENCOUNTER — READMISSION MANAGEMENT (OUTPATIENT)
Dept: CALL CENTER | Facility: HOSPITAL | Age: 70
End: 2020-11-18

## 2020-11-18 NOTE — OUTREACH NOTE
General Surgery Week 2 Survey      Responses   [facility] patient discharged from?  [facility]   Does the patient have one of the following disease processes/diagnoses(primary or secondary)?  General Surgery   Week 2 attempt successful?  Yes   Call start time  1518   Call end time  1519   Discharge diagnosis  cholecystectomy   Meds reviewed with patient/caregiver?  Yes   Is the patient taking all medications as directed (includes completed medication regime)?  Yes   Does the patient have a follow up appointment scheduled with their surgeon?  Yes [11/19/20]   Has the patient kept scheduled appointments due by today?  N/A   Comments  12/21/20 appt with Dr. Evans   What is the patient's perception of their health status since discharge?  Improving   Nursing interventions  Nurse provided patient education   Is the patient/caregiver able to teach back signs and symptoms of incisional infection?  Fever   Is the patient/caregiver able to teach back steps to recovery at home?  Rest and rebuild strength, gradually increase activity, Eat a well-balance diet   If the patient is a current smoker, are they able to teach back resources for cessation?  Smoking cessation medications   Week 2 call completed?  Yes   Revoked  No further contact(revokes)-requires comment   Graduated/Revoked comments  Pt is doing well           Melissa Douglas RN

## 2020-11-19 ENCOUNTER — OFFICE VISIT (OUTPATIENT)
Dept: SURGERY | Facility: CLINIC | Age: 70
End: 2020-11-19

## 2020-11-19 VITALS
SYSTOLIC BLOOD PRESSURE: 157 MMHG | OXYGEN SATURATION: 98 % | BODY MASS INDEX: 34.15 KG/M2 | TEMPERATURE: 97.7 F | HEART RATE: 69 BPM | HEIGHT: 64 IN | WEIGHT: 200 LBS | RESPIRATION RATE: 20 BRPM | DIASTOLIC BLOOD PRESSURE: 76 MMHG

## 2020-11-19 DIAGNOSIS — K85.10 ACUTE BILIARY PANCREATITIS WITHOUT INFECTION OR NECROSIS: Primary | ICD-10-CM

## 2020-11-19 PROCEDURE — 99024 POSTOP FOLLOW-UP VISIT: CPT | Performed by: SURGERY

## 2020-11-19 RX ORDER — FLUCONAZOLE 150 MG/1
1 TABLET ORAL DAILY
COMMUNITY
Start: 2020-11-10

## 2020-11-19 RX ORDER — GABAPENTIN 300 MG/1
300 CAPSULE ORAL 3 TIMES DAILY
COMMUNITY

## 2020-11-19 RX ORDER — IBUPROFEN 200 MG
200 TABLET ORAL AS NEEDED
COMMUNITY

## 2020-11-19 RX ORDER — CYCLOBENZAPRINE HCL 10 MG
10 TABLET ORAL DAILY
COMMUNITY

## 2020-11-19 RX ORDER — MULTIPLE VITAMINS W/ MINERALS TAB 9MG-400MCG
1 TAB ORAL DAILY
COMMUNITY

## 2020-11-19 RX ORDER — FUROSEMIDE 40 MG/1
40 TABLET ORAL DAILY
COMMUNITY

## 2020-11-19 NOTE — PROGRESS NOTES
"Subjective   Radha Chang is a 70 y.o. female.   70-year-old lady who is now about 10 days out from laparoscopic cholecystectomy for acute pancreatitis.  She is recovering quite well from her surgery.  She says that she is eating and drinking pretty well she is having regular bowel function she has not had any severe abdominal pain like what brought her to the hospital.  She continues to have severe back pain that she is always dealt with.    Objective   /76 (BP Location: Right arm, Patient Position: Sitting, Cuff Size: Adult)   Pulse 69   Temp 97.7 °F (36.5 °C) (Temporal)   Resp 20   Ht 162.6 cm (64\")   Wt 90.7 kg (200 lb)   SpO2 98%   BMI 34.33 kg/m²   Physical Exam  On exam she is in no distress her abdomen is soft and obese it is nondistended it is minimally tender to palpation her incisions are healing well without erythema or drainage.  Assessment/Plan   Diagnoses and all orders for this visit:    1. Acute biliary pancreatitis without infection or necrosis (Primary)    Pathology reviewed consistent with chronic cholecystitis mucosal erosions and cholelithiasis.    10 days out from laparoscopic cholecystectomy for acute biliary pancreatitis.  Recovering well.  No restrictions.  Follow-up as needed.    Dipesh Rodrigues MD  11/19/2020  11:00 AM EST    This note was created using Dragon Voice Recognition software.  "

## 2020-12-21 ENCOUNTER — OFFICE (AMBULATORY)
Dept: URBAN - METROPOLITAN AREA CLINIC 64 | Facility: CLINIC | Age: 70
End: 2020-12-21

## 2020-12-21 VITALS
DIASTOLIC BLOOD PRESSURE: 58 MMHG | WEIGHT: 196 LBS | HEIGHT: 64 IN | SYSTOLIC BLOOD PRESSURE: 141 MMHG | HEART RATE: 71 BPM

## 2020-12-21 DIAGNOSIS — Z12.11 ENCOUNTER FOR SCREENING FOR MALIGNANT NEOPLASM OF COLON: ICD-10-CM

## 2020-12-21 DIAGNOSIS — K85.10 BILIARY ACUTE PANCREATITIS WITHOUT NECROSIS OR INFECTION: ICD-10-CM

## 2020-12-21 PROCEDURE — 99214 OFFICE O/P EST MOD 30 MIN: CPT | Performed by: NURSE PRACTITIONER

## 2021-02-02 ENCOUNTER — OFFICE (AMBULATORY)
Dept: URBAN - METROPOLITAN AREA PATHOLOGY 4 | Facility: PATHOLOGY | Age: 71
End: 2021-02-02

## 2021-02-02 ENCOUNTER — OFFICE (AMBULATORY)
Dept: URBAN - METROPOLITAN AREA PATHOLOGY 4 | Facility: PATHOLOGY | Age: 71
End: 2021-02-02
Payer: COMMERCIAL

## 2021-02-02 ENCOUNTER — ON CAMPUS - OUTPATIENT (AMBULATORY)
Dept: URBAN - METROPOLITAN AREA HOSPITAL 2 | Facility: HOSPITAL | Age: 71
End: 2021-02-02

## 2021-02-02 VITALS
SYSTOLIC BLOOD PRESSURE: 137 MMHG | HEART RATE: 82 BPM | DIASTOLIC BLOOD PRESSURE: 105 MMHG | SYSTOLIC BLOOD PRESSURE: 129 MMHG | HEART RATE: 85 BPM | SYSTOLIC BLOOD PRESSURE: 141 MMHG | HEART RATE: 81 BPM | OXYGEN SATURATION: 97 % | HEART RATE: 72 BPM | HEIGHT: 64 IN | RESPIRATION RATE: 18 BRPM | WEIGHT: 183 LBS | SYSTOLIC BLOOD PRESSURE: 158 MMHG | RESPIRATION RATE: 16 BRPM | SYSTOLIC BLOOD PRESSURE: 142 MMHG | HEART RATE: 89 BPM | SYSTOLIC BLOOD PRESSURE: 152 MMHG | HEART RATE: 73 BPM | OXYGEN SATURATION: 100 % | DIASTOLIC BLOOD PRESSURE: 70 MMHG | DIASTOLIC BLOOD PRESSURE: 66 MMHG | DIASTOLIC BLOOD PRESSURE: 88 MMHG | SYSTOLIC BLOOD PRESSURE: 161 MMHG | DIASTOLIC BLOOD PRESSURE: 59 MMHG | DIASTOLIC BLOOD PRESSURE: 67 MMHG | OXYGEN SATURATION: 98 % | TEMPERATURE: 98.4 F | OXYGEN SATURATION: 96 %

## 2021-02-02 DIAGNOSIS — D12.4 BENIGN NEOPLASM OF DESCENDING COLON: ICD-10-CM

## 2021-02-02 DIAGNOSIS — D12.5 BENIGN NEOPLASM OF SIGMOID COLON: ICD-10-CM

## 2021-02-02 DIAGNOSIS — Z12.11 ENCOUNTER FOR SCREENING FOR MALIGNANT NEOPLASM OF COLON: ICD-10-CM

## 2021-02-02 DIAGNOSIS — K64.1 SECOND DEGREE HEMORRHOIDS: ICD-10-CM

## 2021-02-02 PROBLEM — K63.5 POLYP OF COLON: Status: ACTIVE | Noted: 2021-02-02

## 2021-02-02 LAB
GI HISTOLOGY: A. UNSPECIFIED: (no result)
GI HISTOLOGY: B. UNSPECIFIED: (no result)
GI HISTOLOGY: PDF REPORT: (no result)

## 2021-02-02 PROCEDURE — 88305 TISSUE EXAM BY PATHOLOGIST: CPT | Mod: 26 | Performed by: INTERNAL MEDICINE

## 2021-02-02 PROCEDURE — 45385 COLONOSCOPY W/LESION REMOVAL: CPT | Mod: PT | Performed by: INTERNAL MEDICINE

## 2021-02-02 RX ORDER — LIDOCAINE 5 G/100G
OINTMENT TOPICAL
Qty: 2 | Refills: 0 | Status: ACTIVE
Start: 2021-02-02

## 2023-05-10 ENCOUNTER — HOSPITAL ENCOUNTER (INPATIENT)
Facility: HOSPITAL | Age: 73
LOS: 1 days | Discharge: HOME | DRG: 247 | End: 2023-05-12
Attending: EMERGENCY MEDICINE | Admitting: STUDENT IN AN ORGANIZED HEALTH CARE EDUCATION/TRAINING PROGRAM
Payer: MEDICARE

## 2023-05-10 ENCOUNTER — APPOINTMENT (OUTPATIENT)
Dept: GENERAL RADIOLOGY | Facility: HOSPITAL | Age: 73
DRG: 247 | End: 2023-05-10
Payer: MEDICARE

## 2023-05-10 DIAGNOSIS — I21.4 NSTEMI, INITIAL EPISODE OF CARE: ICD-10-CM

## 2023-05-10 DIAGNOSIS — I20.0 UNSTABLE ANGINA: Primary | ICD-10-CM

## 2023-05-10 LAB
ANION GAP SERPL CALCULATED.3IONS-SCNC: 14 MMOL/L (ref 5–15)
BASOPHILS # BLD AUTO: 0.1 10*3/MM3 (ref 0–0.2)
BASOPHILS NFR BLD AUTO: 1.3 % (ref 0–1.5)
BUN SERPL-MCNC: 10 MG/DL (ref 8–23)
BUN/CREAT SERPL: 12 (ref 7–25)
CALCIUM SPEC-SCNC: 9.9 MG/DL (ref 8.6–10.5)
CHLORIDE SERPL-SCNC: 103 MMOL/L (ref 98–107)
CO2 SERPL-SCNC: 22 MMOL/L (ref 22–29)
CREAT SERPL-MCNC: 0.83 MG/DL (ref 0.57–1)
DEPRECATED RDW RBC AUTO: 48.1 FL (ref 37–54)
EGFRCR SERPLBLD CKD-EPI 2021: 74.5 ML/MIN/1.73
EOSINOPHIL # BLD AUTO: 0.1 10*3/MM3 (ref 0–0.4)
EOSINOPHIL NFR BLD AUTO: 0.7 % (ref 0.3–6.2)
ERYTHROCYTE [DISTWIDTH] IN BLOOD BY AUTOMATED COUNT: 13.5 % (ref 12.3–15.4)
GEN 5 2HR TROPONIN T REFLEX: 20 NG/L
GLUCOSE SERPL-MCNC: 114 MG/DL (ref 65–99)
HCT VFR BLD AUTO: 37.7 % (ref 34–46.6)
HGB BLD-MCNC: 12.8 G/DL (ref 12–15.9)
HOLD SPECIMEN: NORMAL
HOLD SPECIMEN: NORMAL
LYMPHOCYTES # BLD AUTO: 3.8 10*3/MM3 (ref 0.7–3.1)
LYMPHOCYTES NFR BLD AUTO: 38.9 % (ref 19.6–45.3)
MCH RBC QN AUTO: 32.8 PG (ref 26.6–33)
MCHC RBC AUTO-ENTMCNC: 33.9 G/DL (ref 31.5–35.7)
MCV RBC AUTO: 96.7 FL (ref 79–97)
MONOCYTES # BLD AUTO: 0.7 10*3/MM3 (ref 0.1–0.9)
MONOCYTES NFR BLD AUTO: 7.6 % (ref 5–12)
NEUTROPHILS NFR BLD AUTO: 5 10*3/MM3 (ref 1.7–7)
NEUTROPHILS NFR BLD AUTO: 51.5 % (ref 42.7–76)
NRBC BLD AUTO-RTO: 0.1 /100 WBC (ref 0–0.2)
PLATELET # BLD AUTO: 235 10*3/MM3 (ref 140–450)
PMV BLD AUTO: 9.3 FL (ref 6–12)
POTASSIUM SERPL-SCNC: 3.5 MMOL/L (ref 3.5–5.2)
RBC # BLD AUTO: 3.9 10*6/MM3 (ref 3.77–5.28)
SODIUM SERPL-SCNC: 139 MMOL/L (ref 136–145)
TROPONIN T DELTA: 8 NG/L
TROPONIN T SERPL HS-MCNC: 12 NG/L
WBC NRBC COR # BLD: 9.7 10*3/MM3 (ref 3.4–10.8)
WHOLE BLOOD HOLD COAG: NORMAL

## 2023-05-10 PROCEDURE — 71045 X-RAY EXAM CHEST 1 VIEW: CPT

## 2023-05-10 PROCEDURE — 80061 LIPID PANEL: CPT | Performed by: STUDENT IN AN ORGANIZED HEALTH CARE EDUCATION/TRAINING PROGRAM

## 2023-05-10 PROCEDURE — 85025 COMPLETE CBC W/AUTO DIFF WBC: CPT | Performed by: EMERGENCY MEDICINE

## 2023-05-10 PROCEDURE — 93005 ELECTROCARDIOGRAM TRACING: CPT | Performed by: EMERGENCY MEDICINE

## 2023-05-10 PROCEDURE — 93005 ELECTROCARDIOGRAM TRACING: CPT

## 2023-05-10 PROCEDURE — 25010000002 MORPHINE PER 10 MG: Performed by: EMERGENCY MEDICINE

## 2023-05-10 PROCEDURE — 99285 EMERGENCY DEPT VISIT HI MDM: CPT

## 2023-05-10 PROCEDURE — 84484 ASSAY OF TROPONIN QUANT: CPT | Performed by: EMERGENCY MEDICINE

## 2023-05-10 PROCEDURE — 83880 ASSAY OF NATRIURETIC PEPTIDE: CPT | Performed by: STUDENT IN AN ORGANIZED HEALTH CARE EDUCATION/TRAINING PROGRAM

## 2023-05-10 PROCEDURE — 84443 ASSAY THYROID STIM HORMONE: CPT | Performed by: STUDENT IN AN ORGANIZED HEALTH CARE EDUCATION/TRAINING PROGRAM

## 2023-05-10 PROCEDURE — 80048 BASIC METABOLIC PNL TOTAL CA: CPT | Performed by: EMERGENCY MEDICINE

## 2023-05-10 RX ORDER — NITROGLYCERIN 20 MG/100ML
10-50 INJECTION INTRAVENOUS
Status: DISCONTINUED | OUTPATIENT
Start: 2023-05-10 | End: 2023-05-12

## 2023-05-10 RX ORDER — SODIUM CHLORIDE 0.9 % (FLUSH) 0.9 %
10 SYRINGE (ML) INJECTION AS NEEDED
Status: DISCONTINUED | OUTPATIENT
Start: 2023-05-10 | End: 2023-05-12 | Stop reason: HOSPADM

## 2023-05-10 RX ORDER — MORPHINE SULFATE 2 MG/ML
2 INJECTION, SOLUTION INTRAMUSCULAR; INTRAVENOUS ONCE
Status: COMPLETED | OUTPATIENT
Start: 2023-05-10 | End: 2023-05-10

## 2023-05-10 RX ORDER — NITROGLYCERIN 20 MG/100ML
INJECTION INTRAVENOUS
Status: COMPLETED
Start: 2023-05-10 | End: 2023-05-10

## 2023-05-10 RX ADMIN — NITROGLYCERIN 10 MCG/MIN: 20 INJECTION INTRAVENOUS at 23:43

## 2023-05-10 RX ADMIN — MORPHINE SULFATE 2 MG: 2 INJECTION, SOLUTION INTRAMUSCULAR; INTRAVENOUS at 22:37

## 2023-05-10 NOTE — Clinical Note
First balloon inflation max pressure = 5 jessica. First balloon inflation duration = 5 seconds. Second inflation of balloon - Max pressure = 10 jessica. 2nd Inflation of balloon - Duration = 6 seconds. Third inflation of balloon - Max pressure = 10 jessica. 3rd Inflation of balloon - Duration = 4 seconds. Fourth inflation of balloon - Max pressure = 10 jessica. 4th Inflation of balloon - Duration = 6 seconds.

## 2023-05-10 NOTE — Clinical Note
OT EVALUATION   03/10/18 1225   Note Type   Note type Eval only   Restrictions/Precautions   Other Precautions Cardiac/sternal;O2;Fall Risk;Hard of hearing   Pain Assessment   Pain Assessment No/denies pain   Pain Score No Pain   Home Living   Type of Home Apartment   Home Layout One level; Able to live on main level with bedroom/bathroom   Bathroom Shower/Tub Tub/shower unit   Bathroom Toilet Standard   Bathroom Equipment Grab bars in shower; Shower chair  (hand held shower)   Home Equipment Cane;Walker  (rollator)   Prior Function   Level of [place] Needs assistance with IADLs; Needs assistance with ADLs and functional mobility   Lives With Alone   Receives Help From Home health; Family   ADL Assistance Needs assistance   IADLs Needs assistance   Falls in the last 6 months 0   Comments pt reports she just returned home from [place] where she was previously in the hospital  She indicates that her aide helps with housekeeping and her ADLS and that her daughters assist her as well  Subjective   Subjective They brouhgt me the wrong peaches     ADL   Where Assessed Chair   Eating Assistance 4  Minimal Assistance   Eating Deficit Setup   Grooming Assistance 4  Minimal Assistance   UB Dressing Assistance 4  Minimal Assistance   LB Dressing Assistance 3  Moderate Assistance   Bed Mobility   Rolling R 5  Supervision   Supine to Sit 5  Supervision   Transfers   Sit to Stand 4  Minimal assistance   Stand to Sit 4  Minimal assistance   Stand pivot 4  Minimal assistance   Functional Mobility   Functional Mobility 4  Minimal assistance   Additional Comments 5 feet   Additional items Rolling walker   Balance   Static Sitting Fair   Dynamic Sitting Fair   Static Standing Fair   Dynamic Standing Fair -   Activity Tolerance   Activity Tolerance Patient limited by pain   RUE Assessment   RUE Assessment (ROM: grossly WFL MMT: 4/5)   LUE Assessment   LUE Assessment (ROM: grossly WFL MMT: 4/5)   Hand Function   Gross Motor catheter removed. Coordination Functional   Fine Motor Coordination Impaired   Sensation   Additional Comments L UE residual deficits from CVA   Cognition   Overall Cognitive Status [facility]   Arousal/Participation Alert; Cooperative   Attention Attends with cues to redirect   Orientation Level Oriented X4   Following Commands Follows one step commands without difficulty   Comments pt hard of hearing   Assessment   Limitation Decreased ADL status; Decreased UE ROM; Decreased UE strength;Decreased endurance;Decreased self-care trans;Decreased high-level ADLs   Prognosis Good   Assessment Patient evaluated by Occupational Therapy  Patient admitted with Acute on chronic diastolic CHF (congestive heart failure) ([facility] [address] )  The patients occupational profile, medical and therapy history includes a expanded review of medical and/or therapy records and additional review of physical, cognitive, or psychosocial history related to current functional performance  Comorbidities affecting functional mobility and ADLS include: TIANA, CHF, CKD, CVA, gout and obesity  Prior to admission, patient was requiring assist for functional mobility with rolling walker, requiring assist for ADLS, requiring assist for IADLS and living alone in a single level home with a few steps to enter  Pt has an aide 3x/week and assist/support from local family members  The evaluation identifies the following performance deficits: weakness, decreased ROM, impaired balance, decreased endurance, decreased coordination, increased fall risk, decreased ADLS, decreased IADLS, decreased activity tolerance, SOB upon exertion and decreased strength, that result in activity limitations and/or participation restrictions  This evaluation requires clinical decision making of high complexity, because the patient presents with comorbidites that affect occupational performance and required significant modification of tasks or assistance with consideration of multiple treatment options    The Barthel Index was used as a functional outcome tool presenting with a score of 40, indicating marked limitations of functional mobility and ADLS  Patient will benefit from skilled Occupational Therapy services to address above deficits and facilitate a safe return to prior level of function  Goals   Patient Goals to go home   STG Time Frame (1-7 days)   Short Term Goal #1 Patient will increase standing tolerance to 4 minutes during functional activity; Patient will increase bed mobility to independent; Patient will increase functional mobility to and from bathroom with rolling walker with min assist to increase performance with ADLS; Patient will tolerate 5 minutes of UE ROM/strengthening to increase general activity tolerance and performance in ADLS/IADLS; Patient will improve functional activity tolerance to 8 minutes of sustained functional tasks to increase participation in basic self-care and decrease assistance level  LTG Time Frame (8-14 days)   Long Term Goal #1 Patient will increase standing tolerance to 7 minutes during functional activity; Patient will increase bed mobility to independent; Patient will increase functional mobility to and from bathroom with rolling walker with supervision to increase performance with ADLS; Patient will tolerate 8 minutes of UE ROM/strengthening to increase general activity tolerance and performance in ADLS/IADLS; Patient will improve functional activity tolerance to 12 minutes of sustained functional tasks to increase participation in basic self-care and decrease assistance level  Functional Transfer Goals   Pt Will Perform All Functional Transfers (STG: supervision LTG: independent)   ADL Goals   Pt Will Perform UE Dressing (STG: supervision LTG: indepedent)   Pt Will Perform LE Dressing (STG: MIN assist LTG: supervision)   Plan   Treatment Interventions ADL retraining;Functional transfer training;UE strengthening/ROM; Endurance training;Patient/family training;Equipment evaluation/education; Activityengagement; Energy conservation   OT Frequency 3-5x/wk   Recommendation   OT Discharge Recommendation 24 hour supervision/assist   Equipment Recommended (BLAYNE ROSS, bedside commode, [employer])   Barthel Index   Feeding 5   Bathing 0   Grooming Score 0   Dressing Score 5   Bladder Score 5   Bowels Score 10   Toilet Use Score 5   Transfers (Bed/Chair) Score 10   Mobility (Level Surface) Score 0   Stairs Score 0   Barthel Index Score 40

## 2023-05-10 NOTE — Clinical Note
A 7 fr sheath was  inserted using micropuncture technique with ultrasound guidance into the right femoral vein. Sheath insertion not delayed.

## 2023-05-10 NOTE — Clinical Note
Hemostasis started on the right radial artery. R-Band was used in achieving hemostasis. Radial compression device applied to vessel. Hemostasis achieved successfully. Closure device additional comment: 16 ml in band

## 2023-05-10 NOTE — Clinical Note
First balloon inflation max pressure = 10 jessica. First balloon inflation duration = 4 seconds. Second inflation of balloon - Max pressure = 10 jessica. 2nd Inflation of balloon - Duration = 2 seconds.

## 2023-05-11 PROBLEM — I21.4 NSTEMI, INITIAL EPISODE OF CARE: Status: ACTIVE | Noted: 2023-05-10

## 2023-05-11 PROBLEM — I20.0 UNSTABLE ANGINA: Status: ACTIVE | Noted: 2023-05-11

## 2023-05-11 LAB
ACT BLD: 233 SECONDS (ref 89–137)
ANION GAP SERPL CALCULATED.3IONS-SCNC: 14 MMOL/L (ref 5–15)
BASE DEFICIT: ABNORMAL
BASE EXCESS BLDV CALC-SCNC: ABNORMAL MMOL/L
BUN SERPL-MCNC: 9 MG/DL (ref 8–23)
BUN/CREAT SERPL: 11.4 (ref 7–25)
CA-I BLDA-SCNC: 1.29 MMOL/L (ref 1.12–1.32)
CALCIUM SPEC-SCNC: 9.6 MG/DL (ref 8.6–10.5)
CHLORIDE SERPL-SCNC: 105 MMOL/L (ref 98–107)
CHOLEST SERPL-MCNC: 210 MG/DL (ref 0–200)
CO2 CONTENT VENOUS: ABNORMAL
CO2 SERPL-SCNC: 23 MMOL/L (ref 22–29)
CREAT SERPL-MCNC: 0.79 MG/DL (ref 0.57–1)
DEPRECATED RDW RBC AUTO: 45.9 FL (ref 37–54)
EGFRCR SERPLBLD CKD-EPI 2021: 79.1 ML/MIN/1.73
ERYTHROCYTE [DISTWIDTH] IN BLOOD BY AUTOMATED COUNT: 13.4 % (ref 12.3–15.4)
GLUCOSE BLDC GLUCOMTR-MCNC: 107 MG/DL (ref 70–105)
GLUCOSE SERPL-MCNC: 118 MG/DL (ref 65–99)
HBA1C MFR BLD: 5.3 % (ref 4.8–5.6)
HCO3 BLDV-SCNC: 24.6 MMOL/L (ref 23–28)
HCT VFR BLD AUTO: 36.3 % (ref 34–46.6)
HCT VFR BLDA CALC: 34 % (ref 38–51)
HDLC SERPL-MCNC: 31 MG/DL (ref 40–60)
HGB BLD-MCNC: 12.2 G/DL (ref 12–15.9)
HGB BLDA-MCNC: 11.6 G/DL (ref 12–17)
LDLC SERPL CALC-MCNC: 107 MG/DL (ref 0–100)
LDLC/HDLC SERPL: 3.08 {RATIO}
MCH RBC QN AUTO: 32.9 PG (ref 26.6–33)
MCHC RBC AUTO-ENTMCNC: 33.6 G/DL (ref 31.5–35.7)
MCV RBC AUTO: 97.8 FL (ref 79–97)
NT-PROBNP SERPL-MCNC: 426.8 PG/ML (ref 0–900)
PCO2 BLDV: 44.4 MM HG (ref 41–51)
PH BLDV: 7.35 PH UNITS (ref 7.31–7.41)
PLATELET # BLD AUTO: 223 10*3/MM3 (ref 140–450)
PMV BLD AUTO: 8.8 FL (ref 6–12)
PO2 BLDV: 34 MM HG (ref 35–42)
POTASSIUM BLDA-SCNC: 3.6 MMOL/L (ref 3.5–4.9)
POTASSIUM SERPL-SCNC: 3.6 MMOL/L (ref 3.5–5.2)
QT INTERVAL: 430 MS
RBC # BLD AUTO: 3.71 10*6/MM3 (ref 3.77–5.28)
SAO2 % BLDCOV: 26 % (ref 45–75)
SODIUM BLD-SCNC: 142 MMOL/L (ref 138–146)
SODIUM SERPL-SCNC: 142 MMOL/L (ref 136–145)
TRIGL SERPL-MCNC: 417 MG/DL (ref 0–150)
TROPONIN T SERPL HS-MCNC: 26 NG/L
TSH SERPL DL<=0.05 MIU/L-ACNC: 3.91 UIU/ML (ref 0.27–4.2)
VLDLC SERPL-MCNC: 72 MG/DL (ref 5–40)
WBC NRBC COR # BLD: 11 10*3/MM3 (ref 3.4–10.8)

## 2023-05-11 PROCEDURE — 99152 MOD SED SAME PHYS/QHP 5/>YRS: CPT | Performed by: INTERNAL MEDICINE

## 2023-05-11 PROCEDURE — 84132 ASSAY OF SERUM POTASSIUM: CPT

## 2023-05-11 PROCEDURE — 84295 ASSAY OF SERUM SODIUM: CPT

## 2023-05-11 PROCEDURE — 85347 COAGULATION TIME ACTIVATED: CPT

## 2023-05-11 PROCEDURE — 25010000002 ONDANSETRON PER 1 MG: Performed by: STUDENT IN AN ORGANIZED HEALTH CARE EDUCATION/TRAINING PROGRAM

## 2023-05-11 PROCEDURE — C1894 INTRO/SHEATH, NON-LASER: HCPCS | Performed by: INTERNAL MEDICINE

## 2023-05-11 PROCEDURE — C1769 GUIDE WIRE: HCPCS | Performed by: INTERNAL MEDICINE

## 2023-05-11 PROCEDURE — C1753 CATH, INTRAVAS ULTRASOUND: HCPCS | Performed by: INTERNAL MEDICINE

## 2023-05-11 PROCEDURE — 85027 COMPLETE CBC AUTOMATED: CPT | Performed by: STUDENT IN AN ORGANIZED HEALTH CARE EDUCATION/TRAINING PROGRAM

## 2023-05-11 PROCEDURE — 92978 ENDOLUMINL IVUS OCT C 1ST: CPT | Performed by: INTERNAL MEDICINE

## 2023-05-11 PROCEDURE — C1874 STENT, COATED/COV W/DEL SYS: HCPCS | Performed by: INTERNAL MEDICINE

## 2023-05-11 PROCEDURE — C1725 CATH, TRANSLUMIN NON-LASER: HCPCS | Performed by: INTERNAL MEDICINE

## 2023-05-11 PROCEDURE — 93460 R&L HRT ART/VENTRICLE ANGIO: CPT | Performed by: INTERNAL MEDICINE

## 2023-05-11 PROCEDURE — 82947 ASSAY GLUCOSE BLOOD QUANT: CPT

## 2023-05-11 PROCEDURE — 85014 HEMATOCRIT: CPT

## 2023-05-11 PROCEDURE — 80048 BASIC METABOLIC PNL TOTAL CA: CPT | Performed by: STUDENT IN AN ORGANIZED HEALTH CARE EDUCATION/TRAINING PROGRAM

## 2023-05-11 PROCEDURE — C9600 PERC DRUG-EL COR STENT SING: HCPCS | Performed by: INTERNAL MEDICINE

## 2023-05-11 PROCEDURE — C1760 CLOSURE DEV, VASC: HCPCS | Performed by: INTERNAL MEDICINE

## 2023-05-11 PROCEDURE — 82330 ASSAY OF CALCIUM: CPT

## 2023-05-11 PROCEDURE — 99153 MOD SED SAME PHYS/QHP EA: CPT | Performed by: INTERNAL MEDICINE

## 2023-05-11 PROCEDURE — 25510000001 IOPAMIDOL PER 1 ML: Performed by: INTERNAL MEDICINE

## 2023-05-11 PROCEDURE — 4A023N8 MEASUREMENT OF CARDIAC SAMPLING AND PRESSURE, BILATERAL, PERCUTANEOUS APPROACH: ICD-10-PCS | Performed by: INTERNAL MEDICINE

## 2023-05-11 PROCEDURE — 84484 ASSAY OF TROPONIN QUANT: CPT | Performed by: STUDENT IN AN ORGANIZED HEALTH CARE EDUCATION/TRAINING PROGRAM

## 2023-05-11 PROCEDURE — 25010000002 MIDAZOLAM PER 1 MG: Performed by: INTERNAL MEDICINE

## 2023-05-11 PROCEDURE — 25010000002 MORPHINE PER 10 MG: Performed by: STUDENT IN AN ORGANIZED HEALTH CARE EDUCATION/TRAINING PROGRAM

## 2023-05-11 PROCEDURE — 25010000002 HYDROMORPHONE 1 MG/ML SOLUTION: Performed by: INTERNAL MEDICINE

## 2023-05-11 PROCEDURE — B241ZZ3 ULTRASONOGRAPHY OF MULTIPLE CORONARY ARTERIES, INTRAVASCULAR: ICD-10-PCS | Performed by: INTERNAL MEDICINE

## 2023-05-11 PROCEDURE — 99223 1ST HOSP IP/OBS HIGH 75: CPT | Performed by: INTERNAL MEDICINE

## 2023-05-11 PROCEDURE — 027035Z DILATION OF CORONARY ARTERY, ONE ARTERY WITH TWO DRUG-ELUTING INTRALUMINAL DEVICES, PERCUTANEOUS APPROACH: ICD-10-PCS | Performed by: INTERNAL MEDICINE

## 2023-05-11 PROCEDURE — 83036 HEMOGLOBIN GLYCOSYLATED A1C: CPT | Performed by: STUDENT IN AN ORGANIZED HEALTH CARE EDUCATION/TRAINING PROGRAM

## 2023-05-11 PROCEDURE — 25010000002 HEPARIN (PORCINE) PER 1000 UNITS: Performed by: STUDENT IN AN ORGANIZED HEALTH CARE EDUCATION/TRAINING PROGRAM

## 2023-05-11 PROCEDURE — C1887 CATHETER, GUIDING: HCPCS | Performed by: INTERNAL MEDICINE

## 2023-05-11 PROCEDURE — 25010000002 HEPARIN (PORCINE) PER 1000 UNITS: Performed by: INTERNAL MEDICINE

## 2023-05-11 PROCEDURE — 92928 PRQ TCAT PLMT NTRAC ST 1 LES: CPT | Performed by: INTERNAL MEDICINE

## 2023-05-11 PROCEDURE — B2111ZZ FLUOROSCOPY OF MULTIPLE CORONARY ARTERIES USING LOW OSMOLAR CONTRAST: ICD-10-PCS | Performed by: INTERNAL MEDICINE

## 2023-05-11 PROCEDURE — 25010000002 FENTANYL CITRATE (PF) 100 MCG/2ML SOLUTION: Performed by: INTERNAL MEDICINE

## 2023-05-11 PROCEDURE — C1751 CATH, INF, PER/CENT/MIDLINE: HCPCS | Performed by: INTERNAL MEDICINE

## 2023-05-11 PROCEDURE — 82803 BLOOD GASES ANY COMBINATION: CPT

## 2023-05-11 DEVICE — XIENCE SKYPOINT™ EVEROLIMUS ELUTING CORONARY STENT SYSTEM 4.00 MM X 38 MM / RAPID-EXCHANGE
Type: IMPLANTABLE DEVICE | Site: CORONARY | Status: FUNCTIONAL
Brand: XIENCE SKYPOINT™

## 2023-05-11 DEVICE — XIENCE SKYPOINT™ EVEROLIMUS ELUTING CORONARY STENT SYSTEM 3.50 MM X 33 MM / RAPID-EXCHANGE
Type: IMPLANTABLE DEVICE | Site: CORONARY | Status: FUNCTIONAL
Brand: XIENCE SKYPOINT™

## 2023-05-11 RX ORDER — HYDROCODONE BITARTRATE AND ACETAMINOPHEN 10; 325 MG/1; MG/1
1 TABLET ORAL EVERY 4 HOURS PRN
Status: DISCONTINUED | OUTPATIENT
Start: 2023-05-11 | End: 2023-05-12 | Stop reason: HOSPADM

## 2023-05-11 RX ORDER — CLOPIDOGREL BISULFATE 75 MG/1
75 TABLET ORAL DAILY
Status: DISCONTINUED | OUTPATIENT
Start: 2023-05-12 | End: 2023-05-12 | Stop reason: HOSPADM

## 2023-05-11 RX ORDER — CLOPIDOGREL BISULFATE 75 MG/1
TABLET ORAL
Status: DISCONTINUED | OUTPATIENT
Start: 2023-05-11 | End: 2023-05-11 | Stop reason: HOSPADM

## 2023-05-11 RX ORDER — LIDOCAINE HYDROCHLORIDE 20 MG/ML
INJECTION, SOLUTION INFILTRATION; PERINEURAL
Status: DISCONTINUED | OUTPATIENT
Start: 2023-05-11 | End: 2023-05-11 | Stop reason: HOSPADM

## 2023-05-11 RX ORDER — DIPHENHYDRAMINE HCL 25 MG
25 CAPSULE ORAL EVERY 6 HOURS PRN
Status: DISCONTINUED | OUTPATIENT
Start: 2023-05-11 | End: 2023-05-12 | Stop reason: HOSPADM

## 2023-05-11 RX ORDER — FENTANYL CITRATE 50 UG/ML
INJECTION, SOLUTION INTRAMUSCULAR; INTRAVENOUS
Status: DISCONTINUED | OUTPATIENT
Start: 2023-05-11 | End: 2023-05-11 | Stop reason: HOSPADM

## 2023-05-11 RX ORDER — HEPARIN SODIUM 1000 [USP'U]/ML
INJECTION, SOLUTION INTRAVENOUS; SUBCUTANEOUS
Status: DISCONTINUED | OUTPATIENT
Start: 2023-05-11 | End: 2023-05-11 | Stop reason: HOSPADM

## 2023-05-11 RX ORDER — SODIUM CHLORIDE 0.9 % (FLUSH) 0.9 %
3 SYRINGE (ML) INJECTION EVERY 12 HOURS SCHEDULED
Status: DISCONTINUED | OUTPATIENT
Start: 2023-05-11 | End: 2023-05-12 | Stop reason: HOSPADM

## 2023-05-11 RX ORDER — GABAPENTIN 300 MG/1
300 CAPSULE ORAL 3 TIMES DAILY
Status: DISCONTINUED | OUTPATIENT
Start: 2023-05-11 | End: 2023-05-11

## 2023-05-11 RX ORDER — SODIUM CHLORIDE 9 MG/ML
40 INJECTION, SOLUTION INTRAVENOUS AS NEEDED
Status: DISCONTINUED | OUTPATIENT
Start: 2023-05-11 | End: 2023-05-12 | Stop reason: HOSPADM

## 2023-05-11 RX ORDER — NITROGLYCERIN 5 MG/ML
INJECTION, SOLUTION INTRAVENOUS
Status: DISCONTINUED | OUTPATIENT
Start: 2023-05-11 | End: 2023-05-11 | Stop reason: HOSPADM

## 2023-05-11 RX ORDER — FUROSEMIDE 40 MG/1
40 TABLET ORAL DAILY
Status: DISCONTINUED | OUTPATIENT
Start: 2023-05-11 | End: 2023-05-12 | Stop reason: HOSPADM

## 2023-05-11 RX ORDER — ATORVASTATIN CALCIUM 40 MG/1
80 TABLET, FILM COATED ORAL DAILY
Status: DISCONTINUED | OUTPATIENT
Start: 2023-05-11 | End: 2023-05-12 | Stop reason: HOSPADM

## 2023-05-11 RX ORDER — PANTOPRAZOLE SODIUM 40 MG/1
40 TABLET, DELAYED RELEASE ORAL 2 TIMES DAILY
Status: DISCONTINUED | OUTPATIENT
Start: 2023-05-11 | End: 2023-05-12 | Stop reason: HOSPADM

## 2023-05-11 RX ORDER — ATENOLOL 50 MG/1
50 TABLET ORAL DAILY
Status: DISCONTINUED | OUTPATIENT
Start: 2023-05-11 | End: 2023-05-12 | Stop reason: HOSPADM

## 2023-05-11 RX ORDER — SODIUM CHLORIDE 9 MG/ML
INJECTION, SOLUTION INTRAVENOUS
Status: DISCONTINUED | OUTPATIENT
Start: 2023-05-11 | End: 2023-05-11

## 2023-05-11 RX ORDER — SODIUM CHLORIDE 0.9 % (FLUSH) 0.9 %
3-10 SYRINGE (ML) INJECTION AS NEEDED
Status: DISCONTINUED | OUTPATIENT
Start: 2023-05-11 | End: 2023-05-12 | Stop reason: HOSPADM

## 2023-05-11 RX ORDER — NICARDIPINE HYDROCHLORIDE 2.5 MG/ML
INJECTION INTRAVENOUS
Status: DISCONTINUED | OUTPATIENT
Start: 2023-05-11 | End: 2023-05-11 | Stop reason: HOSPADM

## 2023-05-11 RX ORDER — SODIUM CHLORIDE 0.9 % (FLUSH) 0.9 %
10 SYRINGE (ML) INJECTION AS NEEDED
Status: DISCONTINUED | OUTPATIENT
Start: 2023-05-11 | End: 2023-05-12 | Stop reason: HOSPADM

## 2023-05-11 RX ORDER — HEPARIN SODIUM 5000 [USP'U]/ML
5000 INJECTION, SOLUTION INTRAVENOUS; SUBCUTANEOUS EVERY 8 HOURS SCHEDULED
Status: DISCONTINUED | OUTPATIENT
Start: 2023-05-11 | End: 2023-05-12

## 2023-05-11 RX ORDER — ONDANSETRON 2 MG/ML
4 INJECTION INTRAMUSCULAR; INTRAVENOUS EVERY 6 HOURS PRN
Status: DISCONTINUED | OUTPATIENT
Start: 2023-05-11 | End: 2023-05-12 | Stop reason: HOSPADM

## 2023-05-11 RX ORDER — ONDANSETRON 4 MG/1
4 TABLET, FILM COATED ORAL EVERY 6 HOURS PRN
Status: DISCONTINUED | OUTPATIENT
Start: 2023-05-11 | End: 2023-05-12 | Stop reason: HOSPADM

## 2023-05-11 RX ORDER — FENOFIBRATE 145 MG/1
145 TABLET, COATED ORAL DAILY
Status: DISCONTINUED | OUTPATIENT
Start: 2023-05-11 | End: 2023-05-12 | Stop reason: HOSPADM

## 2023-05-11 RX ORDER — SODIUM CHLORIDE 0.9 % (FLUSH) 0.9 %
10 SYRINGE (ML) INJECTION EVERY 12 HOURS SCHEDULED
Status: DISCONTINUED | OUTPATIENT
Start: 2023-05-11 | End: 2023-05-12 | Stop reason: HOSPADM

## 2023-05-11 RX ORDER — SODIUM CHLORIDE, SODIUM LACTATE, POTASSIUM CHLORIDE, CALCIUM CHLORIDE 600; 310; 30; 20 MG/100ML; MG/100ML; MG/100ML; MG/100ML
50 INJECTION, SOLUTION INTRAVENOUS CONTINUOUS
Status: DISCONTINUED | OUTPATIENT
Start: 2023-05-11 | End: 2023-05-11

## 2023-05-11 RX ORDER — LISINOPRIL 5 MG/1
2.5 TABLET ORAL
Status: DISCONTINUED | OUTPATIENT
Start: 2023-05-11 | End: 2023-05-12 | Stop reason: HOSPADM

## 2023-05-11 RX ORDER — ACETAMINOPHEN 325 MG/1
650 TABLET ORAL EVERY 4 HOURS PRN
Status: DISCONTINUED | OUTPATIENT
Start: 2023-05-11 | End: 2023-05-12 | Stop reason: HOSPADM

## 2023-05-11 RX ORDER — NITROGLYCERIN 0.4 MG/1
0.4 TABLET SUBLINGUAL
Status: DISCONTINUED | OUTPATIENT
Start: 2023-05-11 | End: 2023-05-12 | Stop reason: HOSPADM

## 2023-05-11 RX ORDER — DOCUSATE SODIUM 100 MG/1
100 CAPSULE, LIQUID FILLED ORAL 2 TIMES DAILY PRN
Status: DISCONTINUED | OUTPATIENT
Start: 2023-05-11 | End: 2023-05-12 | Stop reason: HOSPADM

## 2023-05-11 RX ORDER — MORPHINE SULFATE 2 MG/ML
2 INJECTION, SOLUTION INTRAMUSCULAR; INTRAVENOUS EVERY 4 HOURS PRN
Status: DISPENSED | OUTPATIENT
Start: 2023-05-11 | End: 2023-05-12

## 2023-05-11 RX ORDER — MIDAZOLAM HYDROCHLORIDE 1 MG/ML
INJECTION INTRAMUSCULAR; INTRAVENOUS
Status: DISCONTINUED | OUTPATIENT
Start: 2023-05-11 | End: 2023-05-11 | Stop reason: HOSPADM

## 2023-05-11 RX ADMIN — LISINOPRIL 2.5 MG: 5 TABLET ORAL at 09:38

## 2023-05-11 RX ADMIN — Medication 3 ML: at 21:24

## 2023-05-11 RX ADMIN — PANTOPRAZOLE SODIUM 40 MG: 40 TABLET, DELAYED RELEASE ORAL at 21:24

## 2023-05-11 RX ADMIN — PANTOPRAZOLE SODIUM 40 MG: 40 TABLET, DELAYED RELEASE ORAL at 11:15

## 2023-05-11 RX ADMIN — Medication 10 ML: at 09:36

## 2023-05-11 RX ADMIN — ONDANSETRON 4 MG: 2 INJECTION INTRAMUSCULAR; INTRAVENOUS at 04:16

## 2023-05-11 RX ADMIN — FENOFIBRATE 145 MG: 145 TABLET ORAL at 11:15

## 2023-05-11 RX ADMIN — HEPARIN SODIUM 5000 UNITS: 5000 INJECTION INTRAVENOUS; SUBCUTANEOUS at 06:02

## 2023-05-11 RX ADMIN — HEPARIN SODIUM 5000 UNITS: 5000 INJECTION INTRAVENOUS; SUBCUTANEOUS at 14:28

## 2023-05-11 RX ADMIN — MORPHINE SULFATE 2 MG: 2 INJECTION, SOLUTION INTRAMUSCULAR; INTRAVENOUS at 04:16

## 2023-05-11 RX ADMIN — Medication 10 ML: at 02:30

## 2023-05-11 RX ADMIN — HYDROMORPHONE HYDROCHLORIDE 0.5 MG: 1 INJECTION, SOLUTION INTRAMUSCULAR; INTRAVENOUS; SUBCUTANEOUS at 21:24

## 2023-05-11 RX ADMIN — Medication 10 ML: at 21:24

## 2023-05-11 RX ADMIN — HYDROCODONE BITARTRATE AND ACETAMINOPHEN 1 TABLET: 10; 325 TABLET ORAL at 16:52

## 2023-05-11 RX ADMIN — ATENOLOL 50 MG: 50 TABLET ORAL at 09:37

## 2023-05-11 RX ADMIN — HYDROMORPHONE HYDROCHLORIDE 0.5 MG: 1 INJECTION, SOLUTION INTRAMUSCULAR; INTRAVENOUS; SUBCUTANEOUS at 17:42

## 2023-05-11 RX ADMIN — HYDROCODONE BITARTRATE AND ACETAMINOPHEN 1 TABLET: 10; 325 TABLET ORAL at 11:15

## 2023-05-11 RX ADMIN — HYDROCODONE BITARTRATE AND ACETAMINOPHEN 1 TABLET: 10; 325 TABLET ORAL at 02:12

## 2023-05-11 RX ADMIN — SODIUM CHLORIDE, POTASSIUM CHLORIDE, SODIUM LACTATE AND CALCIUM CHLORIDE 50 ML/HR: 600; 310; 30; 20 INJECTION, SOLUTION INTRAVENOUS at 02:27

## 2023-05-11 RX ADMIN — ATORVASTATIN CALCIUM 80 MG: 40 TABLET, FILM COATED ORAL at 09:37

## 2023-05-11 RX ADMIN — PANTOPRAZOLE SODIUM 40 MG: 40 TABLET, DELAYED RELEASE ORAL at 02:13

## 2023-05-11 RX ADMIN — HYDROCODONE BITARTRATE AND ACETAMINOPHEN 1 TABLET: 10; 325 TABLET ORAL at 06:02

## 2023-05-11 RX ADMIN — Medication 81 MG: at 02:30

## 2023-05-11 RX ADMIN — MORPHINE SULFATE 2 MG: 2 INJECTION, SOLUTION INTRAMUSCULAR; INTRAVENOUS at 09:36

## 2023-05-11 NOTE — H&P
[facility] Medicine Services      Patient Name: Radha Chang  : 1950  MRN: [id number]  Primary Care Physician:  Christian Salvador MD  Date of admission: 5/10/2023      Subjective      Chief Complaint: chest pain    History of Present Illness: Radha Chang is a 72 y.o. female who presented to [facility] on 5/10/2023 complaining of chest pain.  Admits to intermittent 6-8/10 chest pain that has been radiating to both arms over the past two days for hours at a time.  Denies dyspnea, jaw pain, fevers, chills, numbness.  As symptoms worsened she presented to [facility] for evaluation.    In the Ed, vitals 97.8, HR 77, RR 16, /79, 96 RA.  Labs notable for HS troponin 12 > 20, glucose 114, CXR without acute process, EKG NSR without slight lateral ST depressions.  Patient started on nitro drip and admitted for cardiac evaluation.      ROS   12 point ROS reviewed and negative except as mentioned above      Personal History     Past Medical History:   Diagnosis Date   • Arthritis    • Coronary artery disease    • GERD (gastroesophageal reflux disease)    • Hyperlipidemia    • Hypertension    • PAD (peripheral artery disease)        Past Surgical History:   Procedure Laterality Date   • ANGIOPLASTY Right     right leg stent placed   • APPENDECTOMY     • BACK SURGERY     • CARDIAC CATHETERIZATION     • CHOLECYSTECTOMY WITH INTRAOPERATIVE CHOLANGIOGRAM N/A 2020    Procedure: CHOLECYSTECTOMY LAPAROSCOPIC INTRAOPERATIVE CHOLANGIOGRAM;  Surgeon: Dipesh Rodrigues MD;  Location: [facility];  Service: General;  Laterality: N/A;  Gallbladder punctured during procedure   • HYSTERECTOMY     • ROTATOR CUFF REPAIR Right        Family History: family history includes Cancer in her mother; Pancreatitis in her sister. Otherwise pertinent FHx was reviewed and not pertinent to current issue.    Social History:  reports that she has been smoking. She has been smoking an average of 1  pack per day. She has never used smokeless tobacco. She reports that she does not currently use alcohol. She reports that she does not use drugs.    Home Medications:  Prior to Admission Medications     Prescriptions Last Dose Informant Patient Reported? Taking?    Ascorbic Acid (VITAMIN C PO)   Yes No    Take 1 tablet by mouth Daily.    aspirin 81 MG chewable tablet   Yes No    Chew 81 mg Daily.    atenolol (TENORMIN) 25 MG tablet   Yes No    Take 60 mg by mouth Daily.    cyclobenzaprine (FLEXERIL) 10 MG tablet   Yes No    Take 10 mg by mouth Daily.    docusate sodium (COLACE) 50 MG capsule   Yes No    Take  by mouth As Needed for Constipation.    fenofibrate (TRICOR) 145 MG tablet   Yes No    Take 160 mg by mouth Daily.    fluconazole (DIFLUCAN) 150 MG tablet   Yes No    Take 1 tablet by mouth Daily.    furosemide (LASIX) 40 MG tablet   Yes No    Take 40 mg by mouth Daily.    gabapentin (NEURONTIN) 300 MG capsule   Yes No    Take 300 mg by mouth 3 (Three) Times a Day.    HYDROcodone-acetaminophen (NORCO)  MG per tablet   Yes No    Take 1 tablet by mouth Every 4 (Four) Hours As Needed for Moderate Pain .    ibuprofen (ADVIL,MOTRIN) 200 MG tablet   Yes No    Take 200 mg by mouth As Needed for Mild Pain .    multivitamin with minerals (MULTIVITAMIN ADULTS PO)   Yes No    Take 1 tablet by mouth Daily.    omeprazole (priLOSEC) 20 MG capsule   Yes No    Take 20 mg by mouth 2 (two) times a day.    simvastatin (ZOCOR) 40 MG tablet   Yes No    Take 40 mg by mouth Every Night.            Allergies:  Allergies   Allergen Reactions   • Niacin Itching   • Kefzol [Cefazolin] Itching       Objective      Vitals:   Temp:  [97.8 °F (36.6 °C)] 97.8 °F (36.6 °C)  Heart Rate:  [72-95] 77  Resp:  [16-19] 16  BP: (108-185)/(49-86) 157/57    Physical Exam  Constitutional:       General: She is not in acute distress.     Appearance: She is obese. She is not toxic-appearing.   HENT:      Head: Normocephalic and atraumatic.       Nose: Nose normal. No congestion.      Mouth/Throat:      Pharynx: Oropharynx is clear. No oropharyngeal exudate.   Eyes:      General: No scleral icterus.  Cardiovascular:      Rate and Rhythm: Normal rate and regular rhythm.      Heart sounds: No murmur heard.    No friction rub. No gallop.   Pulmonary:      Effort: No respiratory distress.      Breath sounds: No wheezing or rales.   Abdominal:      General: There is no distension.      Tenderness: There is no abdominal tenderness. There is no guarding.   Musculoskeletal:         General: No swelling or deformity.      Cervical back: Normal range of motion. No rigidity.      Right lower leg: No edema.      Left lower leg: No edema.   Skin:     Coloration: Skin is not jaundiced.      Findings: No bruising or lesion.   Neurological:      General: No focal deficit present.      Mental Status: She is alert and oriented to person, place, and time.      Motor: No weakness.          Result Review    Result Review:  I have personally reviewed the results from the time of this admission to 5/11/2023 00:04 EDT and agree with these findings:  [x]  Laboratory  []  Microbiology  [x]  Radiology  [x]  EKG/Telemetry   []  Cardiology/Vascular   []  Pathology  []  Old records  []  Other:        Assessment & Plan        Active Hospital Problems:  Active Hospital Problems    Diagnosis    • **Unstable angina      Plan:     #Chest pain  #CAD  #PAD    - HS troponin 12 > 20    - EKG NSR with slight lateral ST depressions    - aspirin daily    - Lipitor daily    - a1c/tsh/lipid panel    - stress myoview    - nitro drip    - possibly related to uncontrolled HTN    - cardiology consulted    - NPO    #HTN Urgency    - /79    - nitro drip    #HLD    - statin    #Chronic Pain    - resume home Norco    #GERD    -PPI          DVT prophylaxis: Heparin  Medical DVT prophylaxis orders are present.    CODE STATUS:    Code Status (Patient has no pulse and is not breathing): CPR (Attempt to  Resuscitate)  Medical Interventions (Patient has pulse or is breathing): Full Support    Admission Status:  I believe this patient meets observation status.    I discussed the patient's findings and my recommendations with patient.    This patient has been examined wearing appropriate Personal Protective Equipment and discussed with Mike. 05/11/23      Signature: Electronically signed by Arlene Bosch DO, 05/11/23, 12:10 AM EDT.

## 2023-05-11 NOTE — PROGRESS NOTES
[facility] Medicine Services   Daily Progress Note    Patient Name: Radha Chang  : 1950  MRN: [id number]  Primary Care Physician:  Christian Salvador MD  Date of admission: 5/10/2023    Subjective      Admitted in consultation with cardiology for chest pain in the setting of multiple medical comorbidities    Doing okay since admission.  Still having some chest pain and shortness of breath.  Awaiting cardiology consultation.  Will likely benefit from heart catheterization.  Echocardiogram is pending.  Discussed the case with the bedside nursing staff. No other acute concerns.    Objective      Vitals:   Temp:  [97.8 °F (36.6 °C)-98.2 °F (36.8 °C)] 98.2 °F (36.8 °C)  Heart Rate:  [62-95] 66  Resp:  [16-20] 18  BP: ()/(49-93) 152/66    Physical Exam   GEN: WDWN, no acute distress  HEENT: NCAT, PERRLA, moist mucous membranes  NECK: Supple, midline trachea  CARD: RRR, no appreciable M/R/G, no peripheral edema  PULM: Fairly CTAB, non-distressed  ABD: soft, NTND, normoactive bowel sounds throughout  NEURO: Grossly intact, non-focal exam  PSYCH: Pleasant     Result Review    I have personally reviewed the results from the time of this admission to 2023 13:57 EDT and agree with these findings:  [x]  Laboratory  [x]  Microbiology  [x]  Radiology  [x]  EKG/Telemetry   [x]  Cardiology/Vascular   []  Pathology  [x]  Old records  []  Other:    Assessment & Plan      aspirin, 81 mg, Oral, Daily  atenolol, 50 mg, Oral, Daily  atorvastatin, 80 mg, Oral, Daily  fenofibrate, 145 mg, Oral, Daily  heparin (porcine), 5,000 Units, Subcutaneous, Q8H  lisinopril, 2.5 mg, Oral, Q24H  pantoprazole, 40 mg, Oral, BID  sodium chloride, 10 mL, Intravenous, Q12H  sodium chloride, 3 mL, Intravenous, Q12H       nitroglycerin, 10-50 mcg/min, Last Rate: Stopped (23 0645)         Active Hospital Problems    Diagnosis    • **Unstable angina    • NSTEMI, initial episode of care      Plan:   #Chest  pain  #CAD  #PAD    - HS troponin 12 > 20    - EKG NSR with slight lateral ST depressions    - aspirin daily    - Lipitor daily    - a1c/tsh/lipid panel    - stress myoview    - nitro drip    - possibly related to uncontrolled HTN    - cardiology consulted    - NPO     #HTN Urgency    - /79    - nitro drip     #HLD    - statin     #Chronic Pain    - resume home Norco     #GERD    -PPI    DVT prophylaxis:  Medical DVT prophylaxis orders are present.    CODE STATUS:    Code Status (Patient has no pulse and is not breathing): CPR (Attempt to Resuscitate)  Medical Interventions (Patient has pulse or is breathing): Full Support    Disposition:  I expect patient to be discharged at some point over the next 48 hours.    Electronically signed by Stan Queen MD, 05/11/23, 13:57 EDT.  [facility] Hospitalist Team

## 2023-05-11 NOTE — CASE MANAGEMENT/SOCIAL WORK
Discharge Planning Assessment   Saurav     Patient Name: Radha Chang  MRN: [id number]  Today's Date: 5/11/2023    Admit Date: 5/10/2023    Plan: Return home with spouse   Discharge Needs Assessment     Row Name 05/11/23 1200       Living Environment    People in Home spouse    Name(s) of People in Home [place]    Current Living Arrangements home    Primary Care Provided by self    Family Caregiver if Needed spouse    Quality of Family Relationships helpful;involved;supportive    Able to Return to Prior Arrangements yes       Resource/Environmental Concerns    Transportation Concerns none       Transition Planning    Patient/Family Anticipates Transition to home;home with family    Transportation Anticipated family or friend will provide  Spouse       Discharge Needs Assessment    Readmission Within the Last 30 Days no previous admission in last 30 days    Equipment Currently Used at Home cane, quad tip;rollator    Concerns to be Addressed discharge planning               Discharge Plan     Row Name 05/11/23 1206       Plan    Plan Return home with spouse    Plan Comments Spoke with patient at bedside, IADL's, Confirmed PCP and Pharmacy. No transportaton or medication coverage issues.DC Barrier: NTG gtt                Expected Discharge Date and Time     Expected Discharge Date Expected Discharge Time    May 12, 2023          Demographic Summary     Row Name 05/11/23 1204       General Information    Admission Type observation    Arrived From home    Referral Source emergency department;patient    Reason for Consult discharge planning    Preferred Language English               Functional Status     Row Name 05/11/23 1203       Functional Status    Usual Activity Tolerance good    Current Activity Tolerance good       Functional Status, IADL    Medications independent    Meal Preparation independent    Housekeeping independent    Laundry independent    Shopping independent       Mental Status    General  Appearance WDL WDL                 Patient Forms     Row Name 05/11/23 1202       Patient Forms    Patient Observation Letter Delivered  5/11/23 Registration    Delivered to Patient         Met with patient in room wearing PPE: mask, face shield/goggles, gloves, gown.      Maintained distance greater than six feet and spent less than 15 minutes in the room.        Emily Godinez RN

## 2023-05-11 NOTE — CONSULTS
Referring Provider: Stan Queen MD    Reason for Consultation: Chest pain, elevated troponin      Patient Care Team:  Christian Salvador MD as PCP - General (Family Medicine)      SUBJECTIVE     Chief Complaint: Chest pain radiating to arms    History of present illness:  Radha Chang is a 72 y.o. female with multiple cardiovascular risk factors who presented to the hospital with complaints of chest pain.  She complains of on and on chest pain that has really been ongoing for several months however have gotten worse in the last 2 days.  She also reports some shortness of breath associated with this chest pain.  She also reports bilateral leg swelling which has been ongoing for several months.  She has multiple risk factors including ongoing smoking, uncontrolled hyperlipidemia, uncontrolled hypertension and known peripheral arterial disease with previous stents.  She reports significant family history of cardiac disease as well.    During the time of my assessment the patient continues to have dull aching chest discomfort with some improvement since arrival.  She was treated with heparin drip.      Review of systems:    Constitutional: No weakness, fatigue, fever, rigors, chills   Eyes: No vision changes, eye pain   ENT/oropharynx: No difficulty swallowing, sore throat, epistaxis, changes in hearing   Cardiovascular: + chest pain, chest tightness, palpitations, paroxysmal nocturnal dyspnea, orthopnea, diaphoresis, dizziness / syncopal episode   Respiratory: + shortness of breath, dyspnea on exertion, cough, wheezing, hemoptysis   Gastrointestinal: No abdominal pain, nausea, vomiting, diarrhea, bloody stools   Genitourinary: No hematuria, dysuria   Neurological: No headache, tremors, numbness, one-sided weakness    Musculoskeletal: No cramps, myalgias, joint pain, joint swelling   Integument: No rash, + leg edema        Personal History:      Past Medical History:   Diagnosis Date   • Arthritis    •  Coronary artery disease    • GERD (gastroesophageal reflux disease)    • Hyperlipidemia    • Hypertension    • PAD (peripheral artery disease)        Past Surgical History:   Procedure Laterality Date   • ANGIOPLASTY Right     right leg stent placed   • APPENDECTOMY     • BACK SURGERY     • CARDIAC CATHETERIZATION     • CHOLECYSTECTOMY WITH INTRAOPERATIVE CHOLANGIOGRAM N/A 11/6/2020    Procedure: CHOLECYSTECTOMY LAPAROSCOPIC INTRAOPERATIVE CHOLANGIOGRAM;  Surgeon: Dipesh Rodrigues MD;  Location: [facility] MAIN OR;  Service: General;  Laterality: N/A;  Gallbladder punctured during procedure   • HYSTERECTOMY     • ROTATOR CUFF REPAIR Right        Family History   Problem Relation Age of Onset   • Cancer Mother         pancreatic   • Pancreatitis Sister        Social History     Tobacco Use   • Smoking status: Every Day     Packs/day: 1.00     Types: Cigarettes   • Smokeless tobacco: Never   Vaping Use   • Vaping Use: Never used   Substance Use Topics   • Alcohol use: Not Currently   • Drug use: Never        (Not in a hospital admission)       Allergies:     Niacin and Kefzol [cefazolin]    Scheduled Meds:aspirin, 81 mg, Oral, Daily  atenolol, 50 mg, Oral, Daily  atorvastatin, 80 mg, Oral, Daily  fenofibrate, 145 mg, Oral, Daily  gabapentin, 300 mg, Oral, TID  heparin (porcine), 5,000 Units, Subcutaneous, Q8H  pantoprazole, 40 mg, Oral, BID  sodium chloride, 10 mL, Intravenous, Q12H      Continuous Infusions:lactated ringers, 50 mL/hr, Last Rate: 50 mL/hr (05/11/23 0227)  nitroglycerin, 10-50 mcg/min, Last Rate: Stopped (05/11/23 0645)      PRN Meds:•  docusate sodium  •  HYDROcodone-acetaminophen  •  Morphine  •  nitroglycerin  •  ondansetron **OR** ondansetron  •  [COMPLETED] Insert Peripheral IV **AND** sodium chloride  •  sodium chloride  •  sodium chloride      OBJECTIVE    Vital Signs  Vitals:    05/11/23 0215 05/11/23 0300 05/11/23 0600 05/11/23 0725   BP: 109/93 100/69 130/58 142/54   BP Location: Left arm Left  "arm Left arm Left arm   Patient Position: Sitting Lying Sitting Sitting   Pulse: 92 79 84 62   Resp: 18 16 20 18   Temp:   97.9 °F (36.6 °C) 98 °F (36.7 °C)   TempSrc:   Oral Oral   SpO2: 99% 92% 93% 93%   Weight:   90.3 kg (199 lb)    Height:           Flowsheet Rows    Flowsheet Row First Filed Value   Admission Height 162.6 cm (64\") Documented at 05/10/2023 2034   Admission Weight 90.6 kg (199 lb 11.8 oz) Documented at 05/10/2023 2034            Intake/Output Summary (Last 24 hours) at 5/11/2023 0745  Last data filed at 5/11/2023 0300  Gross per 24 hour   Intake 120 ml   Output --   Net 120 ml        Telemetry: Sinus rhythm in the 70s and 80s    Physical Exam:  The patient is alert, oriented and in no distress.  Vital signs as noted above.  Head and neck revealed no carotid bruits or jugular venous distention.  No thyromegaly or lymph adenopathy is present  Lungs clear.  No wheezing.  Breath sounds are normal bilaterally.  Heart normal first and second heart sounds.  Systolic ejection murmur at right upper sternal border.  No precordial rub is present.  No gallop is present.  Abdomen soft and nontender.  No organomegaly is present.  Extremities with good peripheral pulses with bilateral edema up to thigh  Skin warm and dry.  Musculoskeletal system is grossly normal.  CNS grossly normal.       Results Review:  I have personally reviewed the results from the time of this admission to 5/11/2023 07:45 EDT and agree with these findings:  []  Laboratory  []  Microbiology  []  Radiology  []  EKG/Telemetry   []  Cardiology/Vascular   []  Pathology  []  Old records  []  Other:    Most notable findings include:     Lab Results (last 24 hours)     Procedure Component Value Units Date/Time    Basic Metabolic Panel [[id number]]  (Abnormal) Collected: 05/11/23 0434    Specimen: Blood Updated: 05/11/23 0501     Glucose 118 mg/dL      BUN 9 mg/dL      Creatinine 0.79 mg/dL      Sodium 142 mmol/L      Potassium 3.6 mmol/L      " Chloride 105 mmol/L      CO2 23.0 mmol/L      Calcium 9.6 mg/dL      BUN/Creatinine Ratio 11.4     Anion Gap 14.0 mmol/L      eGFR 79.1 mL/min/1.73     Narrative:      GFR Normal >60  Chronic Kidney Disease <60  Kidney Failure <15    The GFR formula is only valid for adults with stable renal function between ages 18 and 70.    High Sensitivity Troponin T [[id number]]  (Abnormal) Collected: 05/11/23 0434    Specimen: Blood Updated: 05/11/23 0501     HS Troponin T 26 ng/L     Narrative:      High Sensitive Troponin T Reference Range:  <10.0 ng/L- Negative Female for AMI  <15.0 ng/L- Negative Male for AMI  >=10 - Abnormal Female indicating possible myocardial injury.  >=15 - Abnormal Male indicating possible myocardial injury.   Clinicians would have to utilize clinical acumen, EKG, Troponin, and serial changes to determine if it is an Acute Myocardial Infarction or myocardial injury due to an underlying chronic condition.         Hemoglobin A1c [[id number]]  (Normal) Collected: 05/11/23 0434    Specimen: Blood Updated: 05/11/23 0501     Hemoglobin A1C 5.30 %     CBC (No Diff) [[id number]]  (Abnormal) Collected: 05/11/23 0434    Specimen: Blood Updated: 05/11/23 0437     WBC 11.00 10*3/mm3      RBC 3.71 10*6/mm3      Hemoglobin 12.2 g/dL      Hematocrit 36.3 %      MCV 97.8 fL      MCH 32.9 pg      MCHC 33.6 g/dL      RDW 13.4 %      RDW-SD 45.9 fl      MPV 8.8 fL      Platelets 223 10*3/mm3     BNP [[id number]]  (Normal) Collected: 05/10/23 2235    Specimen: Blood Updated: 05/11/23 0108     proBNP 426.8 pg/mL     Narrative:      Among patients with dyspnea, NT-proBNP is highly sensitive for the detection of acute congestive heart failure. In addition NT-proBNP of <300 pg/ml effectively rules out acute congestive heart failure with 99% negative predictive value.    Results may be falsely decreased if patient taking Biotin.      TSH [[id number]]  (Normal) Collected: 05/10/23 2235    Specimen: Blood Updated: 05/11/23 0037      TSH 3.910 uIU/mL     Lipid Panel [[id number]]  (Abnormal) Collected: 05/10/23 2235    Specimen: Blood Updated: 05/11/23 0031     Total Cholesterol 210 mg/dL      Triglycerides 417 mg/dL      HDL Cholesterol 31 mg/dL      LDL Cholesterol  107 mg/dL      VLDL Cholesterol 72 mg/dL      LDL/HDL Ratio 3.08    Narrative:      Cholesterol Reference Ranges  (U.S. Department of Health and Human Services ATP III Classifications)    Desirable          <200 mg/dL  Borderline High    200-239 mg/dL  High Risk          >240 mg/dL      Triglyceride Reference Ranges  (U.S. Department of Health and Human Services ATP III Classifications)    Normal           <150 mg/dL  Borderline High  150-199 mg/dL  High             200-499 mg/dL  Very High        >500 mg/dL    HDL Reference Ranges  (U.S. Department of Health and Human Services ATP III Classifications)    Low     <40 mg/dl (major risk factor for CHD)  High    >60 mg/dl ('negative' risk factor for CHD)        LDL Reference Ranges  (U.S. Department of Health and Human Services ATP III Classifications)    Optimal          <100 mg/dL  Near Optimal     100-129 mg/dL  Borderline High  130-159 mg/dL  High             160-189 mg/dL  Very High        >189 mg/dL    High Sensitivity Troponin T 2Hr [[id number]]  (Abnormal) Collected: 05/10/23 2235    Specimen: Blood Updated: 05/10/23 2315     HS Troponin T 20 ng/L      Troponin T Delta 8 ng/L     Narrative:      High Sensitive Troponin T Reference Range:  <10.0 ng/L- Negative Female for AMI  <15.0 ng/L- Negative Male for AMI  >=10 - Abnormal Female indicating possible myocardial injury.  >=15 - Abnormal Male indicating possible myocardial injury.   Clinicians would have to utilize clinical acumen, EKG, Troponin, and serial changes to determine if it is an Acute Myocardial Infarction or myocardial injury due to an underlying chronic condition.         [place] Draw [[id number]] Collected: 05/10/23 2056    Specimen: Blood Updated: 05/10/23 2201     Narrative:      The following orders were created for panel order [place] Draw.  Procedure                               Abnormality         Status                     ---------                               -----------         ------                     Green Top (Gel)[[id number]]                                  Final result               Lavender Top[[id number]]                                     Final result               Gold Top - SST[[id number]]                                   Final result               Light Blue Top[[id number]]                                   Final result                 Please view results for these tests on the individual orders.    Gold Top - SST [[id number]] Collected: 05/10/23 2056    Specimen: Blood Updated: 05/10/23 2201     Extra Tube Hold for add-ons.     Comment: Auto resulted.       Light Blue Top [[id number]] Collected: 05/10/23 2056    Specimen: Blood Updated: 05/10/23 2201     Extra Tube Hold for add-ons.     Comment: Auto resulted       Green Top (Gel) [[id number]] Collected: 05/10/23 2056    Specimen: Blood Updated: 05/10/23 2140     Extra Tube HOLD    Basic Metabolic Panel [[id number]]  (Abnormal) Collected: 05/10/23 2056    Specimen: Blood Updated: 05/10/23 2128     Glucose 114 mg/dL      BUN 10 mg/dL      Creatinine 0.83 mg/dL      Sodium 139 mmol/L      Potassium 3.5 mmol/L      Chloride 103 mmol/L      CO2 22.0 mmol/L      Calcium 9.9 mg/dL      BUN/Creatinine Ratio 12.0     Anion Gap 14.0 mmol/L      eGFR 74.5 mL/min/1.73     Narrative:      GFR Normal >60  Chronic Kidney Disease <60  Kidney Failure <15    The GFR formula is only valid for adults with stable renal function between ages 18 and 70.    High Sensitivity Troponin T [[id number]]  (Abnormal) Collected: 05/10/23 2056    Specimen: Blood Updated: 05/10/23 2128     HS Troponin T 12 ng/L     Narrative:      High Sensitive Troponin T Reference Range:  <10.0 ng/L- Negative Female for AMI  <15.0 ng/L- Negative Male for  AMI  >=10 - Abnormal Female indicating possible myocardial injury.  >=15 - Abnormal Male indicating possible myocardial injury.   Clinicians would have to utilize clinical acumen, EKG, Troponin, and serial changes to determine if it is an Acute Myocardial Infarction or myocardial injury due to an underlying chronic condition.         Ruthie Phan [[id number]] Collected: 05/10/23 2056    Specimen: Blood Updated: 05/10/23 2123    CBC & Differential [[id number]]  (Abnormal) Collected: 05/10/23 2056    Specimen: Blood Updated: 05/10/23 2122    Narrative:      The following orders were created for panel order CBC & Differential.  Procedure                               Abnormality         Status                     ---------                               -----------         ------                     CBC Auto Differential[[id number]]        Abnormal            Final result                 Please view results for these tests on the individual orders.    CBC Auto Differential [[id number]]  (Abnormal) Collected: 05/10/23 2056    Specimen: Blood Updated: 05/10/23 2122     WBC 9.70 10*3/mm3      RBC 3.90 10*6/mm3      Hemoglobin 12.8 g/dL      Hematocrit 37.7 %      MCV 96.7 fL      MCH 32.8 pg      MCHC 33.9 g/dL      RDW 13.5 %      RDW-SD 48.1 fl      MPV 9.3 fL      Platelets 235 10*3/mm3      Neutrophil % 51.5 %      Lymphocyte % 38.9 %      Monocyte % 7.6 %      Eosinophil % 0.7 %      Basophil % 1.3 %      Neutrophils, Absolute 5.00 10*3/mm3      Lymphocytes, Absolute 3.80 10*3/mm3      Monocytes, Absolute 0.70 10*3/mm3      Eosinophils, Absolute 0.10 10*3/mm3      Basophils, Absolute 0.10 10*3/mm3      nRBC 0.1 /100 WBC           Imaging Results (Last 24 Hours)     Procedure Component Value Units Date/Time    XR Chest 1 View [[id number]] Collected: 05/10/23 2137     Updated: 05/10/23 2140    Narrative:      XR CHEST 1 VW    Date of Exam: 5/10/2023 9:22 PM EDT    Indication: Chest pain    Comparison: November 5,  2020    Findings:  There are no airspace consolidations. No pleural fluid. No pneumothorax. The pulmonary vasculature appears within normal limits. The cardiac and mediastinal silhouette appear unremarkable. No acute osseous abnormality identified.      Impression:      Impression:  No active disease      Electronically Signed: Angel Verdin    5/10/2023 9:38 PM EDT    Workstation ID: [id number]          LAB RESULTS (LAST 7 DAYS)    CBC  Results from last 7 days   Lab Units 05/11/23  0434 05/10/23  2056   WBC 10*3/mm3 11.00* 9.70   RBC 10*6/mm3 3.71* 3.90   HEMOGLOBIN g/dL 12.2 12.8   HEMATOCRIT % 36.3 37.7   MCV fL 97.8* 96.7   PLATELETS 10*3/mm3 223 235       BMP  Results from last 7 days   Lab Units 05/11/23  0434 05/10/23  2056   SODIUM mmol/L 142 139   POTASSIUM mmol/L 3.6 3.5   CHLORIDE mmol/L 105 103   CO2 mmol/L 23.0 22.0   BUN mg/dL 9 10   CREATININE mg/dL 0.79 0.83   GLUCOSE mg/dL 118* 114*       CMP   Results from last 7 days   Lab Units 05/11/23  0434 05/10/23  2056   SODIUM mmol/L 142 139   POTASSIUM mmol/L 3.6 3.5   CHLORIDE mmol/L 105 103   CO2 mmol/L 23.0 22.0   BUN mg/dL 9 10   CREATININE mg/dL 0.79 0.83   GLUCOSE mg/dL 118* 114*       BNP        TROPONIN  Results from last 7 days   Lab Units 05/11/23 0434   HSTROP T ng/L 26*       CoAg        Creatinine Clearance  Estimated Creatinine Clearance: 69 mL/min (by C-G formula based on SCr of 0.79 mg/dL).    ABG          Radiology  XR Chest 1 View    Result Date: 5/10/2023  Impression: No active disease Electronically Signed: Angel Verdin  5/10/2023 9:38 PM EDT  Workstation ID: [id number]        EKG  I personally viewed and interpreted the patient's EKG/Telemetry data:  ECG 12 Lead Chest Pain   Final Result   HEART RATE= 76  bpm   RR Interval= 788  ms   NM Interval= 152  ms   P Horizontal Axis= -14  deg   P Front Axis= 64  deg   QRSD Interval= 78  ms   QT Interval= 430  ms   QRS Axis= 74  deg   T Wave Axis= 4  deg   - BORDERLINE ECG -   Sinus rhythm   Probable  left atrial enlargement   Low voltage, precordial leads   When compared with ECG of 05-Nov-2020 15:46:40,   No significant change   Electronically Signed By: Angel Solis ([email or web address]) 11-May-2023 06:45:54   Date and Time of Study: 2023-05-10 20:40:21            Echocardiogram:          Stress Test:        Cardiac Catheterization:  No results found for this or any previous visit.        Other:      ASSESSMENT & PLAN:    Principal Problem:    Unstable angina    NSTEMI  She has typical chest pain with ECG changes as well as mildly elevated troponin.  She will be started on aspirin, beta-blocker and high intensity statin.  She also has evidence of volume overload with bilateral lower extremity edema  Obtain an echocardiogram to evaluate LV and valvular function  Risk and benefit of definitive cardiac catheterization discussed with the patient.  We will proceed with right and left heart cath.    Hypertension  She will be started on nitroglycerin drip.  We will uptitrate antihypertensives as tolerated.  She is on atenolol and we will add ACE inhibitor and long-acting nitrate.      Hyperlipidemia  , HDL 31, total cholesterol 210, VLDL 72.  Start high intensity statin  A1c is 5.3    Peripheral arterial disease  History of stents in bilateral lower extremities.  She will need elective LANDEN  Continue antiplatelet therapy and high intensity statin    Obesity  BMI is 34  She will benefit from elective screening and treatment of sleep apnea  Diet, exercise, weight loss and lifestyle modification discussed with the patient    Chronic pain  Continue Norco    GERD  She reports a history of heartburn and acid reflux.  Continue PPI during inpatient stay.      Pedro Avila MD  05/11/23  07:45 EDT

## 2023-05-11 NOTE — ED PROVIDER NOTES
Subjective   History of Present Illness  Patient is a 72-year-old female complaining of chest pain intermittently for the past 2 days.  She describes it as an achiness that lasts hours at a time.  Denies cough fever shortness of breath vomiting or other complaint.        Review of Systems    Past Medical History:   Diagnosis Date   • Arthritis    • Coronary artery disease    • GERD (gastroesophageal reflux disease)    • Hyperlipidemia    • Hypertension    • PAD (peripheral artery disease)        Allergies   Allergen Reactions   • Niacin Itching   • Kefzol [Cefazolin] Itching       Past Surgical History:   Procedure Laterality Date   • ANGIOPLASTY Right     right leg stent placed   • APPENDECTOMY     • BACK SURGERY     • CARDIAC CATHETERIZATION     • CHOLECYSTECTOMY WITH INTRAOPERATIVE CHOLANGIOGRAM N/A 11/6/2020    Procedure: CHOLECYSTECTOMY LAPAROSCOPIC INTRAOPERATIVE CHOLANGIOGRAM;  Surgeon: Dipesh Rodrigues MD;  Location: [facility];  Service: General;  Laterality: N/A;  Gallbladder punctured during procedure   • HYSTERECTOMY     • ROTATOR CUFF REPAIR Right        Family History   Problem Relation Age of Onset   • Cancer Mother         pancreatic   • Pancreatitis Sister        Social History     Socioeconomic History   • Marital status:    Tobacco Use   • Smoking status: Every Day     Packs/day: 1.00     Types: Cigarettes   • Smokeless tobacco: Never   Substance and Sexual Activity   • Alcohol use: Not Currently   • Drug use: Never           Objective   Physical Exam  Neck has no adenopathy JVD or bruits.  Lungs are clear.  Heart has regular rate rhythm without murmur rub or gallop.  Chest is nontender.  Abdomen is soft nontender.  Extremity exam is no cyanosis or edema.  Procedures     By EKG interpretation shows normal sinus rhythm at a rate of 74 with no acute ST change      ED Course      Results for orders placed or performed during the hospital encounter of 05/10/23   Basic Metabolic Panel     Specimen: Blood   Result Value Ref Range    Glucose 114 (H) 65 - 99 mg/dL    BUN 10 8 - 23 mg/dL    Creatinine 0.83 0.57 - 1.00 mg/dL    Sodium 139 136 - 145 mmol/L    Potassium 3.5 3.5 - 5.2 mmol/L    Chloride 103 98 - 107 mmol/L    CO2 22.0 22.0 - 29.0 mmol/L    Calcium 9.9 8.6 - 10.5 mg/dL    BUN/Creatinine Ratio 12.0 7.0 - 25.0    Anion Gap 14.0 5.0 - 15.0 mmol/L    eGFR 74.5 >60.0 mL/min/1.73   High Sensitivity Troponin T    Specimen: Blood   Result Value Ref Range    HS Troponin T 12 (H) <10 ng/L   CBC Auto Differential    Specimen: Blood   Result Value Ref Range    WBC 9.70 3.40 - 10.80 10*3/mm3    RBC 3.90 3.77 - 5.28 10*6/mm3    Hemoglobin 12.8 12.0 - 15.9 g/dL    Hematocrit 37.7 34.0 - 46.6 %    MCV 96.7 79.0 - 97.0 fL    MCH 32.8 26.6 - 33.0 pg    MCHC 33.9 31.5 - 35.7 g/dL    RDW 13.5 12.3 - 15.4 %    RDW-SD 48.1 37.0 - 54.0 fl    MPV 9.3 6.0 - 12.0 fL    Platelets 235 140 - 450 10*3/mm3    Neutrophil % 51.5 42.7 - 76.0 %    Lymphocyte % 38.9 19.6 - 45.3 %    Monocyte % 7.6 5.0 - 12.0 %    Eosinophil % 0.7 0.3 - 6.2 %    Basophil % 1.3 0.0 - 1.5 %    Neutrophils, Absolute 5.00 1.70 - 7.00 10*3/mm3    Lymphocytes, Absolute 3.80 (H) 0.70 - 3.10 10*3/mm3    Monocytes, Absolute 0.70 0.10 - 0.90 10*3/mm3    Eosinophils, Absolute 0.10 0.00 - 0.40 10*3/mm3    Basophils, Absolute 0.10 0.00 - 0.20 10*3/mm3    nRBC 0.1 0.0 - 0.2 /100 WBC   High Sensitivity Troponin T 2Hr    Specimen: Blood   Result Value Ref Range    HS Troponin T 20 (H) <10 ng/L    Troponin T Delta 8 (C) >=-4 - <+4 ng/L   ECG 12 Lead Chest Pain   Result Value Ref Range    QT Interval 430 ms   Green Top (Gel)   Result Value Ref Range    Extra Tube HOLD    Gold Top - SST   Result Value Ref Range    Extra Tube Hold for add-ons.    Light Blue Top   Result Value Ref Range    Extra Tube Hold for add-ons.      XR Chest 1 View    Result Date: 5/10/2023  Impression: No active disease Electronically Signed: Angel Verdin  5/10/2023 9:38 PM EDT  Workstation  ID: [id number]                                         Medical Decision Making  My chest interpretation shows no cardiac effusion or infiltrate.  Patient has no evidence of acute [place] based on EKG.  The patient's troponin did elevate by 8 on a repeat 2-hour troponin.  Metabolic panel is at baseline without renal insufficiency or electrolyte [place].  There is no acute infectious process.  Patient given morphine 2 mg IV with mild improvement.  She was placed on Tridil patient will admitted with a diagnosis of unstable angina.  I did speak the on-call hospitalist.    Amount and/or Complexity of Data Reviewed  Labs: ordered. Decision-making details documented in ED Course.  Radiology: ordered and independent interpretation performed.  ECG/medicine tests: ordered and independent interpretation performed.      Risk  Prescription drug management.  Parenteral controlled substances.  Drug therapy requiring intensive monitoring for toxicity.  Decision regarding hospitalization.          Final diagnoses:   Unstable angina       ED Disposition  ED Disposition     ED Disposition   Decision to Admit    Condition   --    Comment   --             No follow-up provider specified.       Medication List      No changes were made to your prescriptions during this visit.          Angel Solis MD  05/10/23 1977

## 2023-05-12 ENCOUNTER — READMISSION MANAGEMENT (OUTPATIENT)
Dept: CALL CENTER | Facility: HOSPITAL | Age: 73
End: 2023-05-12
Payer: MEDICARE

## 2023-05-12 ENCOUNTER — APPOINTMENT (OUTPATIENT)
Dept: CARDIOLOGY | Facility: HOSPITAL | Age: 73
DRG: 247 | End: 2023-05-12
Payer: MEDICARE

## 2023-05-12 VITALS
HEIGHT: 64 IN | HEART RATE: 65 BPM | DIASTOLIC BLOOD PRESSURE: 49 MMHG | SYSTOLIC BLOOD PRESSURE: 130 MMHG | TEMPERATURE: 98.1 F | WEIGHT: 198 LBS | OXYGEN SATURATION: 97 % | BODY MASS INDEX: 33.8 KG/M2 | RESPIRATION RATE: 12 BRPM

## 2023-05-12 PROBLEM — G89.29 CHRONIC LOW BACK PAIN: Chronic | Status: ACTIVE | Noted: 2023-05-12

## 2023-05-12 PROBLEM — K21.9 GERD WITHOUT ESOPHAGITIS: Chronic | Status: ACTIVE | Noted: 2023-05-12

## 2023-05-12 PROBLEM — F11.20 CHRONIC NARCOTIC DEPENDENCE: Chronic | Status: ACTIVE | Noted: 2023-05-12

## 2023-05-12 PROBLEM — Z95.5 S/P CORONARY ARTERY STENT PLACEMENT: Chronic | Status: ACTIVE | Noted: 2023-05-12

## 2023-05-12 PROBLEM — I25.10 CHRONIC CORONARY ARTERY DISEASE: Chronic | Status: ACTIVE | Noted: 2023-05-12

## 2023-05-12 PROBLEM — E66.9 OBESITY (BMI 30-39.9): Chronic | Status: ACTIVE | Noted: 2023-05-12

## 2023-05-12 PROBLEM — M54.50 CHRONIC LOW BACK PAIN: Chronic | Status: ACTIVE | Noted: 2023-05-12

## 2023-05-12 LAB
ANION GAP SERPL CALCULATED.3IONS-SCNC: 10 MMOL/L (ref 5–15)
BH CV ECHO MEAS - ACS: 1.9 CM
BH CV ECHO MEAS - AI P1/2T: 1953 MSEC
BH CV ECHO MEAS - AO MAX PG: 11.3 MMHG
BH CV ECHO MEAS - AO MEAN PG: 5 MMHG
BH CV ECHO MEAS - AO ROOT DIAM: 2.5 CM
BH CV ECHO MEAS - AO V2 MAX: 168 CM/SEC
BH CV ECHO MEAS - AO V2 VTI: 36.3 CM
BH CV ECHO MEAS - AVA(I,D): 2.48 CM2
BH CV ECHO MEAS - EDV(CUBED): 110.6 ML
BH CV ECHO MEAS - EDV(MOD-SP2): 80.4 ML
BH CV ECHO MEAS - EDV(MOD-SP4): 46.6 ML
BH CV ECHO MEAS - EF(MOD-BP): 70.2 %
BH CV ECHO MEAS - EF(MOD-SP2): 76.9 %
BH CV ECHO MEAS - EF(MOD-SP4): 57.3 %
BH CV ECHO MEAS - ESV(CUBED): 35.9 ML
BH CV ECHO MEAS - ESV(MOD-SP2): 18.6 ML
BH CV ECHO MEAS - ESV(MOD-SP4): 19.9 ML
BH CV ECHO MEAS - FS: 31.3 %
BH CV ECHO MEAS - IVS/LVPW: 0.91 CM
BH CV ECHO MEAS - IVSD: 1 CM
BH CV ECHO MEAS - LA DIMENSION: 3.2 CM
BH CV ECHO MEAS - LAT PEAK E' VEL: 8.6 CM/SEC
BH CV ECHO MEAS - LV DIASTOLIC VOL/BSA (35-75): 23.9 CM2
BH CV ECHO MEAS - LV MASS(C)D: 181.9 GRAMS
BH CV ECHO MEAS - LV MAX PG: 6.8 MMHG
BH CV ECHO MEAS - LV MEAN PG: 3 MMHG
BH CV ECHO MEAS - LV SYSTOLIC VOL/BSA (12-30): 10.2 CM2
BH CV ECHO MEAS - LV V1 MAX: 130 CM/SEC
BH CV ECHO MEAS - LV V1 VTI: 31.8 CM
BH CV ECHO MEAS - LVIDD: 4.8 CM
BH CV ECHO MEAS - LVIDS: 3.3 CM
BH CV ECHO MEAS - LVOT AREA: 2.8 CM2
BH CV ECHO MEAS - LVOT DIAM: 1.9 CM
BH CV ECHO MEAS - LVPWD: 1.1 CM
BH CV ECHO MEAS - MED PEAK E' VEL: 7 CM/SEC
BH CV ECHO MEAS - MR MAX PG: 114.9 MMHG
BH CV ECHO MEAS - MR MAX VEL: 536 CM/SEC
BH CV ECHO MEAS - MV A DUR: 0.13 SEC
BH CV ECHO MEAS - MV A MAX VEL: 90.6 CM/SEC
BH CV ECHO MEAS - MV DEC SLOPE: 474 CM/SEC2
BH CV ECHO MEAS - MV DEC TIME: 0.23 MSEC
BH CV ECHO MEAS - MV E MAX VEL: 90.6 CM/SEC
BH CV ECHO MEAS - MV E/A: 1
BH CV ECHO MEAS - MV MAX PG: 6.6 MMHG
BH CV ECHO MEAS - MV MEAN PG: 3 MMHG
BH CV ECHO MEAS - MV P1/2T: 79.1 MSEC
BH CV ECHO MEAS - MV V2 VTI: 40 CM
BH CV ECHO MEAS - MVA(P1/2T): 2.8 CM2
BH CV ECHO MEAS - MVA(VTI): 2.25 CM2
BH CV ECHO MEAS - PA V2 MAX: 132 CM/SEC
BH CV ECHO MEAS - PULM A REVS DUR: 0.13 SEC
BH CV ECHO MEAS - PULM A REVS VEL: 28.7 CM/SEC
BH CV ECHO MEAS - PULM DIAS VEL: 45.6 CM/SEC
BH CV ECHO MEAS - PULM S/D: 1.11
BH CV ECHO MEAS - PULM SYS VEL: 50.6 CM/SEC
BH CV ECHO MEAS - RAP SYSTOLE: 3 MMHG
BH CV ECHO MEAS - RV MAX PG: 7 MMHG
BH CV ECHO MEAS - RV V1 MAX: 132 CM/SEC
BH CV ECHO MEAS - RV V1 VTI: 23.7 CM
BH CV ECHO MEAS - RVSP: 33 MMHG
BH CV ECHO MEAS - SI(MOD-SP2): 31.7 ML/M2
BH CV ECHO MEAS - SI(MOD-SP4): 13.7 ML/M2
BH CV ECHO MEAS - SV(LVOT): 90.2 ML
BH CV ECHO MEAS - SV(MOD-SP2): 61.8 ML
BH CV ECHO MEAS - SV(MOD-SP4): 26.7 ML
BH CV ECHO MEAS - TAPSE (>1.6): 2.6 CM
BH CV ECHO MEAS - TR MAX PG: 29.8 MMHG
BH CV ECHO MEAS - TR MAX VEL: 273 CM/SEC
BH CV ECHO MEASUREMENTS AVERAGE E/E' RATIO: 11.62
BH CV XLRA - RV BASE: 3.8 CM
BH CV XLRA - RV LENGTH: 6.5 CM
BH CV XLRA - RV MID: 2.5 CM
BH CV XLRA - TDI S': 12.9 CM/SEC
BUN SERPL-MCNC: 8 MG/DL (ref 8–23)
BUN/CREAT SERPL: 12.5 (ref 7–25)
CALCIUM SPEC-SCNC: 8.6 MG/DL (ref 8.6–10.5)
CHLORIDE SERPL-SCNC: 105 MMOL/L (ref 98–107)
CO2 SERPL-SCNC: 23 MMOL/L (ref 22–29)
CREAT SERPL-MCNC: 0.64 MG/DL (ref 0.57–1)
DEPRECATED RDW RBC AUTO: 48.6 FL (ref 37–54)
EGFRCR SERPLBLD CKD-EPI 2021: 93.4 ML/MIN/1.73
ERYTHROCYTE [DISTWIDTH] IN BLOOD BY AUTOMATED COUNT: 13.7 % (ref 12.3–15.4)
GLUCOSE SERPL-MCNC: 102 MG/DL (ref 65–99)
HCT VFR BLD AUTO: 32.9 % (ref 34–46.6)
HGB BLD-MCNC: 10.7 G/DL (ref 12–15.9)
LEFT ATRIUM VOLUME INDEX: 33.6 ML/M2
MAXIMAL PREDICTED HEART RATE: 147 BPM
MCH RBC QN AUTO: 32.7 PG (ref 26.6–33)
MCHC RBC AUTO-ENTMCNC: 32.5 G/DL (ref 31.5–35.7)
MCV RBC AUTO: 100.7 FL (ref 79–97)
PLATELET # BLD AUTO: 180 10*3/MM3 (ref 140–450)
PMV BLD AUTO: 8.7 FL (ref 6–12)
POTASSIUM SERPL-SCNC: 3.7 MMOL/L (ref 3.5–5.2)
RBC # BLD AUTO: 3.27 10*6/MM3 (ref 3.77–5.28)
SINUS: 2.3 CM
SODIUM SERPL-SCNC: 138 MMOL/L (ref 136–145)
STJ: 2 CM
STRESS TARGET HR: 125 BPM
WBC NRBC COR # BLD: 7.7 10*3/MM3 (ref 3.4–10.8)

## 2023-05-12 PROCEDURE — 93306 TTE W/DOPPLER COMPLETE: CPT

## 2023-05-12 PROCEDURE — 99232 SBSQ HOSP IP/OBS MODERATE 35: CPT | Performed by: INTERNAL MEDICINE

## 2023-05-12 PROCEDURE — 25010000002 HYDROMORPHONE 1 MG/ML SOLUTION: Performed by: INTERNAL MEDICINE

## 2023-05-12 PROCEDURE — 85027 COMPLETE CBC AUTOMATED: CPT | Performed by: INTERNAL MEDICINE

## 2023-05-12 PROCEDURE — 93306 TTE W/DOPPLER COMPLETE: CPT | Performed by: INTERNAL MEDICINE

## 2023-05-12 PROCEDURE — 36415 COLL VENOUS BLD VENIPUNCTURE: CPT | Performed by: INTERNAL MEDICINE

## 2023-05-12 PROCEDURE — 80048 BASIC METABOLIC PNL TOTAL CA: CPT | Performed by: INTERNAL MEDICINE

## 2023-05-12 PROCEDURE — 25010000002 HEPARIN (PORCINE) PER 1000 UNITS: Performed by: STUDENT IN AN ORGANIZED HEALTH CARE EDUCATION/TRAINING PROGRAM

## 2023-05-12 RX ORDER — LISINOPRIL 2.5 MG/1
2.5 TABLET ORAL
Qty: 30 TABLET | Refills: 11 | Status: SHIPPED | OUTPATIENT
Start: 2023-05-13 | End: 2023-05-26 | Stop reason: SDUPTHER

## 2023-05-12 RX ORDER — HEPARIN SODIUM 5000 [USP'U]/ML
5000 INJECTION, SOLUTION INTRAVENOUS; SUBCUTANEOUS EVERY 8 HOURS SCHEDULED
Status: DISCONTINUED | OUTPATIENT
Start: 2023-05-12 | End: 2023-05-12 | Stop reason: HOSPADM

## 2023-05-12 RX ORDER — CLOPIDOGREL BISULFATE 75 MG/1
75 TABLET ORAL DAILY
Qty: 30 TABLET | Refills: 11 | Status: SHIPPED | OUTPATIENT
Start: 2023-05-13 | End: 2023-05-26 | Stop reason: SDUPTHER

## 2023-05-12 RX ORDER — ATORVASTATIN CALCIUM 80 MG/1
80 TABLET, FILM COATED ORAL DAILY
Qty: 30 TABLET | Refills: 11 | Status: SHIPPED | OUTPATIENT
Start: 2023-05-13 | End: 2023-05-26 | Stop reason: SDUPTHER

## 2023-05-12 RX ORDER — ATENOLOL 50 MG/1
50 TABLET ORAL DAILY
Qty: 30 TABLET | Refills: 0 | Status: SHIPPED | OUTPATIENT
Start: 2023-05-13 | End: 2023-05-26 | Stop reason: SDUPTHER

## 2023-05-12 RX ADMIN — HYDROCODONE BITARTRATE AND ACETAMINOPHEN 1 TABLET: 10; 325 TABLET ORAL at 00:43

## 2023-05-12 RX ADMIN — Medication 3 ML: at 09:34

## 2023-05-12 RX ADMIN — ATENOLOL 50 MG: 50 TABLET ORAL at 09:18

## 2023-05-12 RX ADMIN — CLOPIDOGREL BISULFATE 75 MG: 75 TABLET ORAL at 09:17

## 2023-05-12 RX ADMIN — Medication 10 ML: at 09:19

## 2023-05-12 RX ADMIN — Medication 81 MG: at 09:17

## 2023-05-12 RX ADMIN — ATORVASTATIN CALCIUM 80 MG: 40 TABLET, FILM COATED ORAL at 09:17

## 2023-05-12 RX ADMIN — LISINOPRIL 2.5 MG: 5 TABLET ORAL at 09:18

## 2023-05-12 RX ADMIN — FUROSEMIDE 40 MG: 40 TABLET ORAL at 09:18

## 2023-05-12 RX ADMIN — PANTOPRAZOLE SODIUM 40 MG: 40 TABLET, DELAYED RELEASE ORAL at 09:18

## 2023-05-12 RX ADMIN — HYDROMORPHONE HYDROCHLORIDE 0.5 MG: 1 INJECTION, SOLUTION INTRAMUSCULAR; INTRAVENOUS; SUBCUTANEOUS at 05:48

## 2023-05-12 RX ADMIN — HEPARIN SODIUM 5000 UNITS: 5000 INJECTION INTRAVENOUS; SUBCUTANEOUS at 05:51

## 2023-05-12 RX ADMIN — HYDROCODONE BITARTRATE AND ACETAMINOPHEN 1 TABLET: 10; 325 TABLET ORAL at 09:18

## 2023-05-12 RX ADMIN — FENOFIBRATE 145 MG: 145 TABLET ORAL at 09:18

## 2023-05-12 RX ADMIN — HYDROMORPHONE HYDROCHLORIDE 0.5 MG: 1 INJECTION, SOLUTION INTRAMUSCULAR; INTRAVENOUS; SUBCUTANEOUS at 02:25

## 2023-05-12 NOTE — DISCHARGE SUMMARY
[facility]  Discharge Summary    Date of Service: 2023  Patient Name: Radha Chang  : 1950  MRN: [id number]    Date of Admission: 5/10/2023  Discharge Diagnosis: see below  Date of Discharge:  2023  Primary Care Physician: Christian Salvador MD    Presenting Problem:   Unstable angina [I20.0]  NSTEMI, initial episode of care [I21.4]    Active and Resolved Hospital Problems:  Active Hospital Problems    Diagnosis POA   • **Unstable angina [I20.0] Yes   • Chronic coronary artery disease [I25.10] Yes   • S/P coronary artery stent placement [Z95.5] Not Applicable   • Obesity (BMI 30-39.9) [E66.9] Yes   • GERD without esophagitis [K21.9] Yes   • Chronic low back pain [M54.50, G89.29] Yes   • Chronic narcotic dependence [F11.20] Yes   • NSTEMI, initial episode of care [I21.4] Yes   • PAD (peripheral artery disease) [I73.9] Yes   • Hyperlipidemia [E78.5] Yes   • Hypertension [I10] Yes   • Tobacco dependence syndrome [F17.200] Yes      Resolved Hospital Problems   No resolved problems to display.     Hospital Course     HPI:  Radha Chang is a 72 y.o. female who presented to [facility] on 5/10/2023 complaining of chest pain.  Admits to intermittent 6-8/10 chest pain that has been radiating to both arms over the past two days for hours at a time.  Denies dyspnea, jaw pain, fevers, chills, numbness.  As symptoms worsened she presented to [facility] for evaluation.     In the Ed, vitals 97.8, HR 77, RR 16, /79, 96 RA.  Labs notable for HS troponin 12 > 20, glucose 114, CXR without acute process, EKG NSR without slight lateral ST depressions.  Patient started on nitro drip and admitted for cardiac evaluation.    Hospital course:  She was admitted in consultation with cardiology on a nitroglycerin drip for further evaluation and treatment.  Her hemoglobin A1c was 5.3.  A transthoracic echocardiogram was completed with findings as noted.  She underwent  cardiac catheterization with findings of multivessel coronary artery disease.  She received multiple stents in the RCA.  This was tolerated well, and she was feeling well enough to go home today.  We had a long discussion about her chronic pain management.  She has had 2 back surgeries in takes chronic narcotic therapy and ibuprofen at home along with muscle relaxants for her back pain.  She will be on dual antiplatelet therapy for at least the next year, and I discouraged her strongly from taking ibuprofen regularly (currently taking up to eight 200 mg tablets daily).  Her primary care provider manages her pain medications.  I encouraged her to discuss with him the possibility of taking a pure narcotic formulation with separate Tylenol use as needed for breakthrough pain (vice taking Norco and limiting Tylenol use otherwise).  She was agreeable to having this discussion.  She will discharge home today with close outpatient primary care and cardiology follow-up.  Outpatient sleep study should be considered to rule out sleep apnea in order to maximize her cardiopulmonary health.  This can be organized and arranged through her primary care provider as well.    Day of Discharge     Vital Signs:  Temp:  [97.4 °F (36.3 °C)-98.1 °F (36.7 °C)] 98.1 °F (36.7 °C)  Heart Rate:  [49-92] 65  Resp:  [12-20] 12  BP: ()/(37-77) 130/49  Flow (L/min):  [2] 2    Physical Exam:  Physical Exam:  GEN: Obese, debilitated elderly woman, no acute distress  HEENT: NCAT, PERRLA, moist mucous membranes  NECK: Supple, midline trachea  CARDIAC: RRR, no peripheral edema  PULM: CTAB, nondistressed  ABD: soft, NTND, normoactive bowel sounds throughout  SKIN: Warm, dry  NEURO: Grossly intact, nonfocal exam  PSYCH: Pleasant    Pertinent  and/or Most Recent Results     LAB RESULTS:      Lab 05/12/23  0443 05/11/23  1541 05/11/23  0434 05/10/23  2056   WBC 7.70  --  11.00* 9.70   HEMOGLOBIN 10.7*  --  12.2 12.8   HEMOGLOBIN, POC  --  11.6*  --    --    HEMATOCRIT 32.9*  --  36.3 37.7   HEMATOCRIT POC  --  34*  --   --    PLATELETS 180  --  223 235   NEUTROS ABS  --   --   --  5.00   LYMPHS ABS  --   --   --  3.80*   MONOS ABS  --   --   --  0.70   EOS ABS  --   --   --  0.10   .7*  --  97.8* 96.7         Lab 23  0443 05/11/23  0434 05/10/23  2235 05/10/23  2056   SODIUM 138 142  --  139   POTASSIUM 3.7 3.6  --  3.5   CHLORIDE 105 105  --  103   CO2 23.0 23.0  --  22.0   ANION GAP 10.0 14.0  --  14.0   BUN 8 9  --  10   CREATININE 0.64 0.79  --  0.83   EGFR 93.4 79.1  --  74.5   GLUCOSE 102* 118*  --  114*   CALCIUM 8.6 9.6  --  9.9   HEMOGLOBIN A1C  --  5.30  --   --    TSH  --   --  3.910  --              Lab 05/11/23  0434 05/10/23  2235 05/10/23  2056   PROBNP  --  426.8  --    HSTROP T 26* 20* 12*         Lab 05/10/23  2235   CHOLESTEROL 210*   LDL CHOL 107*   HDL CHOL 31*   TRIGLYCERIDES 417*             Brief Urine Lab Results     None        Microbiology Results (last 10 days)     ** No results found for the last 240 hours. **        XR Chest 1 View    Result Date: 5/10/2023  Impression: Impression: No active disease Electronically Signed: Angel Verdin  5/10/2023 9:38 PM EDT  Workstation ID: [id number]    Procedures Performed  Procedure(s):  Right and Left Heart Cath  Percutaneous Coronary Intervention  Intravascular Ultrasound         Results for orders placed during the hospital encounter of 05/10/23    Adult Transthoracic Echo Complete W/ Cont if Necessary Per Protocol    Interpretation Summary  •  Left ventricular ejection fraction appears to be 61 - 65%.  •  Left ventricular diastolic dysfunction is noted.  •  Estimated right ventricular systolic pressure from tricuspid regurgitation is normal (<35 mmHg).  •  No significant valvular abnormalities.  ____________________________________________________________________________________________    Patient Information    Patient Name   Radha N Longest N   [id number] Legal Sex   Female   (Age)   1950 (72 y.o.)     Race Ethnicity Encounter Category   White or  Not  or  Emergency      [facility] PACS Images     Show images for Cardiac Catheterization/Vascular Study   Printable Vessel Diagram    Printable Vessel Diagram     Physicians    Panel Physicians Referring Physician Case Authorizing Physician   Pedro Avila MD (Primary) Christian Salvador MD Singh, Vikas, MD     Indications    NSTEMI, initial episode of care [I21.4 (ICD-10-CM)]     Pre Procedure Diagnosis    NSTEMI         Conclusion       OPERATORS  Pedro Avila M.D. (Attending Cardiologist)        PROCEDURE PERFORMED  Ultrasound guided vascular access  Right heart Catheterization  Left Heart Catheterization  Coronary Angiogram 85013  PCI with IADA placement of culprit vessel RCA 60005  IVUS of RCA 21747  Moderate Sedation 75 minutes     INDICATIONS FOR PROCEDURE  72 years old woman with coronary artery disease, peripheral arterial disease, ongoing smoking presented with acute MI, chest pain and troponin elevation.  She also has pulmonary hypertension.  After discussing the risk and benefit of cardiac catheterization she was brought into the Cath Lab for right and left heart cath.     PROCEDURE IN DETAIL  Informed consent was obtained from the patient after explaining the risks, benefits, and alternative options of the procedure. After obtaining informed consent, the patient was brought to the cath lab and was prepped in a sterile fashion. Lidocaine 1% was used for local anesthesia into the right femoral venous access site. Right femoral venous vein was accessed using the micropuncture needle under ultrasound guidance and micropuncture wire advanced under flouroscopy. A 7 Icelandic vascular sheath was put into place percutaneously over guide-wire. Guide wires were removed. A 7Fr swan rosalee catheter was advanced to wedge position. RA, RV and PA and wedge pressures were recorded. PA sat and arterial sats recorded and the  swan was subsequently removed in its entirety.  Of note the right common femoral arterial access was also obtained however there is 100% occlusion of iliac artery on the right side. Next, The right radial artery was accessed with an angiocath needle under ultrasound guidance. A 6F slendersheath was inserted successfully.  Afterwards, 6F JR4 diagnostic catheter was advanced over a wire into the ascending aorta and was used to cross the AV and obtain LV pressures.  Gradient across the AV measured via pullback technique.  Next, 6F JL3.5 and JR4 catheters were used to engage the ostia of the left main and RCA respectively. Images of the right and left coronary systems were obtained.      CORONARY ANGIOGRAM FINDINGS:  Dominance: Right     #Left main: Left main is a large vessel Which gives rise to the LAD left circumflex artery.  Left main has diffuse 20 to 30% disease.     #Left Anterior Descending Artery: LAD is a large caliber vessel which gives rise to several septal perforators and several diagonal branches.  Mid LAD has diffuse disease including 70 to 80% stenosis in the midsegment.  First diagonal has proximal 50 to 60% stenosis, second diagonal has ostial 80% stenosis.     #Left Circumflex: The LCx is a small caliber vessel with diffuse nonobstructive disease     #Right Coronary Artery: The RCA is a large, dominant vessel which gives rise to several small caliber branches and the PDA and PLV.  Mid RCA has 99% thrombotic occlusion.  PARUL-3 flow        [facility] HEMODYNAMICS:  LVp 119/12, 20 mmHg  AOp 110/51, 73 mmHg     No significant gradient across the aortic valve during pullback of JR4 catheter.  LV gram was not performed due to pending echocardiogram     [facility] HEM[facility]NAMICS:  RA 13/11, 12 mmHg  RV 38/6, 13 mmHg  PA 39/18, 26 mmHg  PCW 24/19, 18 mmHg  AO Sat 93%   PA Sat 63%     Smith CO 5.22 L/min     Smith CI 2.67 L/min/m²     Percutaneous coronary intervention  100 units/kg of heparin was administered and ACT of more  than 250 was documented.  A 6 [place] JR4 guide with sideholes was used to engage the ostium of the right coronary artery.  I then advanced a 0.014 run-through wire into the distal RCA.  This was followed by predilation of mid RCA with two 5 x 15 mm compliant balloon.  After this advanced intravascular ultrasound catheter into the distal RCA and slow manual pullback was performed.  This showed mid RCA measurement 3.5 mm and proximal RCA 4 mm.  IVUS also confirmed thrombotic lesion with plaque rupture in the midsegment.  I then placed 2 overlapping stents 3.5 x 33 mm followed by 4 x 38 mm Xience peyman point stent both deployed at 12 jessica.  I then advanced a 4 mm x 15 mm noncompliant balloon and postdilated both stents at 16 jessica.  Final angiography showed 0% stenosis and PARUL-3 flow in the RCA.  Final IVUS run showed complete expansion of the stent and excellent apposition.     The catheters were exchanged over a wire and subsequently removed. The patient tolerated the procedure well without any complications. The pictures were reviewed at the end of the procedure. A TR band was applied.     ESTIMATED BLOOD LOSS:  20 ml     COMPLICATIONS:  None     PROCEDURE DATA:  Sedation Time: 75 minutes     IMPRESSIONS  Two-vessel obstructive coronary disease involving LAD and RCA  IVUS guided PCI with drug-eluting stent placement of culprit vessel RCA  Mild pulmonary hypertension with elevated LVEDP     RECOMMENDATIONS  -- Dual antiplatelet therapy  -- Needs better blood pressure control  -- Smoking cessation and cardiac rehab  -Medical management of LAD disease.      Recommendations    •     Dual antiplatelet therapy for one year.     Procedure Narrative    Risks, benefits and alternatives were discussed with the patient and/or family. Plan is for moderate sedation.  An immediate assessment was done prior to the administration of moderate sedation. Less than 20 mL of estimated blood loss during the case. No specimen was collected  during the case.     Time Under Physician Observation    Name Panel Role Time Period   Pedro Avila MD Panel 1 Primary 5/11/2023 1520      Total Sedation Time    Under my direct supervision, intravenous moderate sedation was administered during the course of this procedure with continuous monitoring of hemodynamic parameters. Total estimated time of moderate sedation was 1 hour 12 minutes 5 seconds       ____________________________________________________________________________________________      Consults     Date and Time Order Name Status Description    5/11/2023 12:01 AM Inpatient Cardiology Consult Completed     5/10/2023 11:31 PM Hospitalist (on-call MD unless specified)          Discharge Details        Discharge Medications      New Medications      Instructions Start Date   atorvastatin 80 MG tablet  Commonly known as: LIPITOR   80 mg, Oral, Daily   Start Date: May 13, 2023     clopidogrel 75 MG tablet  Commonly known as: PLAVIX   75 mg, Oral, Daily   Start Date: May 13, 2023     lisinopril 2.5 MG tablet  Commonly known as: PRINIVIL,ZESTRIL   2.5 mg, Oral, Every 24 Hours Scheduled   Start Date: May 13, 2023        Changes to Medications      Instructions Start Date   atenolol 50 MG tablet  Commonly known as: TENORMIN  What changed:   · medication strength  · how much to take   50 mg, Oral, Daily   Start Date: May 13, 2023        Continue These Medications      Instructions Start Date   aspirin 81 MG chewable tablet   81 mg, Oral, Daily      cyclobenzaprine 10 MG tablet  Commonly known as: FLEXERIL   10 mg, Oral, 2 Times Daily PRN      docusate sodium 50 MG capsule  Commonly known as: COLACE   Oral, As Needed      fenofibrate 145 MG tablet  Commonly known as: TRICOR   160 mg, Oral, Daily      furosemide 40 MG tablet  Commonly known as: LASIX   40 mg, Oral, Daily      HYDROcodone-acetaminophen  MG per tablet  Commonly known as: NORCO   1 tablet, Oral, Every 4 Hours PRN      multivitamin with  minerals tablet tablet   1 tablet, Oral, Daily      omeprazole 20 MG capsule  Commonly known as: priLOSEC   20 mg, Oral, 2 times daily         Stop These Medications    ibuprofen 200 MG tablet  Commonly known as: ADVIL,MOTRIN     simvastatin 40 MG tablet  Commonly known as: ZOCOR          Allergies   Allergen Reactions   • Niacin Itching   • Kefzol [Cefazolin] Itching     Discharge Disposition:   Home or Self Care    Diet:  Hospital:  Diet Order   Procedures   • Diet: Cardiac Diets; Healthy Heart (2-3 Na+); Texture: Regular Texture (IDDSI 7); Fluid Consistency: Thin (IDDSI 0)     Discharge Activity:   Activity Instructions     Activity as Tolerated          CODE STATUS:  Code Status and Medical Interventions:   Ordered at: 05/11/23 0001     Code Status (Patient has no pulse and is not breathing):    CPR (Attempt to Resuscitate)     Medical Interventions (Patient has pulse or is breathing):    Full Support     Future Appointments   Date Time Provider Department Center   5/26/2023  2:00 PM Pedro Avila MD [facility]       Additional Instructions for the Follow-ups that You Need to Schedule     Call MD With Problems / Concerns   As directed      Instructions: PCM vs Cardiology as appropriate    Order Comments: Instructions: PCM vs Cardiology as appropriate          Discharge Follow-up with PCP   As directed       Currently Documented PCP:    Christian Salvador MD    PCP Phone Number:    [phone]     Follow Up Details: within one week of discharge         Discharge Follow-up with Specialty: Cardiology follow up as per their service   As directed      Specialty: Cardiology follow up as per their service             Time spent on Discharge including face to face service: 35 minutes    Signature: Electronically signed by Stan Queen MD, 05/12/23, 11:28 EDT.  [facility] Hospitalist Team

## 2023-05-12 NOTE — PROGRESS NOTES
Referring Provider: Stan Queen MD    Reason for follow-up: Chest pain, coronary artery disease, elevated troponin     Patient Care Team:  Christian Salvador MD as PCP - General (Family Medicine)      SUBJECTIVE  Patient is sitting up in chair smiling.  Denies any chest pain or shortness of breath.  She is eager to go home     ROS  Review of all systems negative except as indicated.    Since I have last seen, the patient has been without any chest discomfort, shortness of breath, palpitations, dizziness or syncope.  Denies having any headache, abdominal pain, nausea, vomiting, diarrhea, constipation, loss of weight or loss of appetite.  Denies having any excessive bruising, hematuria or blood in the stool.  ROS      Personal History:    Past Medical History:   Diagnosis Date   • Arthritis    • Coronary artery disease    • GERD (gastroesophageal reflux disease)    • Hyperlipidemia    • Hypertension    • PAD (peripheral artery disease)        Past Surgical History:   Procedure Laterality Date   • ANGIOPLASTY Right     right leg stent placed   • APPENDECTOMY     • BACK SURGERY     • CARDIAC CATHETERIZATION     • CHOLECYSTECTOMY WITH INTRAOPERATIVE CHOLANGIOGRAM N/A 11/6/2020    Procedure: CHOLECYSTECTOMY LAPAROSCOPIC INTRAOPERATIVE CHOLANGIOGRAM;  Surgeon: Dipesh Rodrigues MD;  Location: [facility];  Service: General;  Laterality: N/A;  Gallbladder punctured during procedure   • HYSTERECTOMY     • ROTATOR CUFF REPAIR Right        Family History   Problem Relation Age of Onset   • Cancer Mother         pancreatic   • Pancreatitis Sister        Social History     Tobacco Use   • Smoking status: Every Day     Packs/day: 1.00     Types: Cigarettes   • Smokeless tobacco: Never   Vaping Use   • Vaping Use: Never used   Substance Use Topics   • Alcohol use: Not Currently   • Drug use: Never        Medications Prior to Admission   Medication Sig Dispense Refill Last Dose   • aspirin 81 MG chewable tablet Chew 1  tablet Daily.      • atenolol (TENORMIN) 25 MG tablet Take 60 mg by mouth Daily.      • cyclobenzaprine (FLEXERIL) 10 MG tablet Take 1 tablet by mouth 2 (Two) Times a Day As Needed.      • docusate sodium (COLACE) 50 MG capsule Take  by mouth As Needed for Constipation.      • fenofibrate (TRICOR) 145 MG tablet Take 160 mg by mouth Daily.      • furosemide (LASIX) 40 MG tablet Take 1 tablet by mouth Daily.      • HYDROcodone-acetaminophen (NORCO)  MG per tablet Take 1 tablet by mouth Every 4 (Four) Hours As Needed for Moderate Pain.      • ibuprofen (ADVIL,MOTRIN) 200 MG tablet Take 1 tablet by mouth As Needed for Mild Pain.      • multivitamin with minerals tablet tablet Take 1 tablet by mouth Daily.      • omeprazole (priLOSEC) 20 MG capsule Take 1 capsule by mouth 2 (two) times a day.      • simvastatin (ZOCOR) 40 MG tablet Take 1 tablet by mouth Every Night.          Allergies:  Niacin and Kefzol [cefazolin]    Scheduled Meds:aspirin, 81 mg, Oral, Daily  atenolol, 50 mg, Oral, Daily  atorvastatin, 80 mg, Oral, Daily  clopidogrel, 75 mg, Oral, Daily  fenofibrate, 145 mg, Oral, Daily  furosemide, 40 mg, Oral, Daily  heparin (porcine), 5,000 Units, Subcutaneous, Q8H  lisinopril, 2.5 mg, Oral, Q24H  pantoprazole, 40 mg, Oral, BID  sodium chloride, 10 mL, Intravenous, Q12H  sodium chloride, 3 mL, Intravenous, Q12H      Continuous Infusions:nitroglycerin, 10-50 mcg/min, Last Rate: Stopped (05/12/23 0352)      PRN Meds:.•  acetaminophen  •  diphenhydrAMINE  •  docusate sodium  •  HYDROcodone-acetaminophen  •  HYDROmorphone  •  nitroglycerin  •  ondansetron **OR** ondansetron  •  ondansetron **OR** ondansetron  •  [COMPLETED] Insert Peripheral IV **AND** sodium chloride  •  sodium chloride  •  sodium chloride  •  sodium chloride  •  sodium chloride      OBJECTIVE    Vital Signs  Vitals:    05/12/23 0316 05/12/23 0331 05/12/23 0352 05/12/23 0500   BP: 117/51 113/43 109/45 105/56   BP Location:    Left arm   Patient  "Position:    Lying   Pulse: (!) 49 67 72 70   Resp:    16   Temp:    97.8 °F (36.6 °C)   TempSrc:    Oral   SpO2: 100% 100%  100%   Weight:    90.2 kg (198 lb 13.7 oz)   Height:           Flowsheet Rows      Flowsheet Row First Filed Value   Admission Height 162.6 cm (64\") Documented at 05/10/2023 2034   Admission Weight 90.6 kg (199 lb 11.8 oz) Documented at 05/10/2023 2034              Intake/Output Summary (Last 24 hours) at 5/12/2023 0752  Last data filed at 5/11/2023 1830  Gross per 24 hour   Intake --   Output 250 ml   Net -250 ml          Telemetry: Sinus rhythm    Physical Exam:  The patient is alert, oriented and in no distress.  Obese  Vital signs as noted above.  Head and neck revealed no carotid bruits or jugular venous distention.  No thyromegaly or lymphadenopathy is present  Lungs clear.  No wheezing.  Breath sounds are normal bilaterally.  Heart normal first and second heart sounds.  No murmur. No precordial rub is present.  No gallop is present.  Abdomen soft and nontender.  No organomegaly is present.  Extremities with good peripheral pulses without any pedal edema.  Skin warm and dry.  Musculoskeletal system is grossly normal.  CNS grossly normal.       Results Review:  I have personally reviewed the results from the time of this admission to 5/12/2023 07:52 EDT and agree with these findings:  []  Laboratory  []  Microbiology  []  Radiology  []  EKG/Telemetry   []  Cardiology/Vascular   []  Pathology  []  Old records  []  Other:    Most notable findings include:    Lab Results (last 24 hours)       Procedure Component Value Units Date/Time    Basic Metabolic Panel [[id number]]  (Abnormal) Collected: 05/12/23 0443    Specimen: Blood Updated: 05/12/23 0515     Glucose 102 mg/dL      BUN 8 mg/dL      Creatinine 0.64 mg/dL      Sodium 138 mmol/L      Potassium 3.7 mmol/L      Chloride 105 mmol/L      CO2 23.0 mmol/L      Calcium 8.6 mg/dL      BUN/Creatinine Ratio 12.5     Anion Gap 10.0 mmol/L      " eGFR 93.4 mL/min/1.73     Narrative:      GFR Normal >60  Chronic Kidney Disease <60  Kidney Failure <15    The GFR formula is only valid for adults with stable renal function between ages 18 and 70.    CBC (No Diff) [[id number]]  (Abnormal) Collected: 05/12/23 0443    Specimen: Blood Updated: 05/12/23 0450     WBC 7.70 10*3/mm3      RBC 3.27 10*6/mm3      Hemoglobin 10.7 g/dL      Hematocrit 32.9 %      .7 fL      MCH 32.7 pg      MCHC 32.5 g/dL      RDW 13.7 %      RDW-SD 48.6 fl      MPV 8.7 fL      Platelets 180 10*3/mm3     POC Activated Clotting Time [[id number]]  (Abnormal) Collected: 05/11/23 1605    Specimen: Arterial Blood Updated: 05/11/23 1735     Activated Clotting Time  233 Seconds      Comment: Serial Number: [id number]:  [id number]       POC Chem Panel [[id number]]  (Abnormal) Collected: 05/11/23 1541    Specimen: Venous Blood Updated: 05/11/23 1735     Glucose 107 mg/dL      Comment: Serial Number: [id number]:  [id number]        Sodium 142 mmol/L      POC Potassium 3.6 mmol/L      Ionized Calcium 1.29 mmol/L      Hematocrit 34 %      Hemoglobin 11.6 g/dL      pH, Venous 7.350 pH Units      pCO2, Venous 44.4 mm Hg      CO2 CONTENT  --     HCO3, Venous 24.6 mmol/L      Base Excess, Venous --     Base Deficit --     O2 Saturation, Venous 26.0 %      pO2, Venous 34.0 mm Hg             Imaging Results (Last 24 Hours)       ** No results found for the last 24 hours. **            LAB RESULTS (LAST 7 DAYS)    CBC  Results from last 7 days   Lab Units 05/12/23 0443 05/11/23  1541 05/11/23  0434 05/10/23  2056   WBC 10*3/mm3 7.70  --  11.00* 9.70   RBC 10*6/mm3 3.27*  --  3.71* 3.90   HEMOGLOBIN g/dL 10.7*  --  12.2 12.8   HEMOGLOBIN, POC g/dL  --  11.6*  --   --    HEMATOCRIT % 32.9*  --  36.3 37.7   HEMATOCRIT POC %  --  34*  --   --    MCV fL 100.7*  --  97.8* 96.7   PLATELETS 10*3/mm3 180  --  223 235       BMP  Results from last 7 days   Lab Units 05/12/23  0443 05/11/23  0434 05/10/23  5805    SODIUM mmol/L 138 142 139   POTASSIUM mmol/L 3.7 3.6 3.5   CHLORIDE mmol/L 105 105 103   CO2 mmol/L 23.0 23.0 22.0   BUN mg/dL 8 9 10   CREATININE mg/dL 0.64 0.79 0.83   GLUCOSE mg/dL 102* 118* 114*       CMP   Results from last 7 days   Lab Units 05/12/23  0443 05/11/23  0434 05/10/23  2056   SODIUM mmol/L 138 142 139   POTASSIUM mmol/L 3.7 3.6 3.5   CHLORIDE mmol/L 105 105 103   CO2 mmol/L 23.0 23.0 22.0   BUN mg/dL 8 9 10   CREATININE mg/dL 0.64 0.79 0.83   GLUCOSE mg/dL 102* 118* 114*       BNP        TROPONIN  Results from last 7 days   Lab Units 05/11/23 0434   HSTROP T ng/L 26*       CoAg        Creatinine Clearance  Estimated Creatinine Clearance: 85.2 mL/min (by C-G formula based on SCr of 0.64 mg/dL).    ABG        Radiology  XR Chest 1 View    Result Date: 5/10/2023  Impression: No active disease Electronically Signed: Angel Verdin  5/10/2023 9:38 PM EDT  Workstation ID: [id number]        EKG  I personally viewed and interpreted the patient's EKG/Telemetry data:  ECG 12 Lead Chest Pain   Final Result   HEART RATE= 76  bpm   RR Interval= 788  ms   OH Interval= 152  ms   P Horizontal Axis= -14  deg   P Front Axis= 64  deg   QRSD Interval= 78  ms   QT Interval= 430  ms   QRS Axis= 74  deg   T Wave Axis= 4  deg   - BORDERLINE ECG -   Sinus rhythm   Probable left atrial enlargement   Low voltage, precordial leads   When compared with ECG of 05-Nov-2020 15:46:40,   No significant change   Electronically Signed By: Angel Solis ([email or web address]) 11-May-2023 06:45:54   Date and Time of Study: 2023-05-10 20:40:21            Echocardiogram:          Stress Test:         Cardiac Catheterization:  Results for orders placed during the hospital encounter of 05/10/23    Cardiac Catheterization/Vascular Study    Narrative  OPERATORS  Pedro Avila M.D. (Attending Cardiologist)      PROCEDURE PERFORMED  Ultrasound guided vascular access  Right heart Catheterization  Left Heart Catheterization  Coronary Angiogram 20410  PCI with AIDA  placement of culprit vessel RCA 02761  IVUS of RCA 37819  Moderate Sedation 75 minutes    INDICATIONS FOR PROCEDURE  72 years old woman with coronary artery disease, peripheral arterial disease, ongoing smoking presented with acute MI, chest pain and troponin elevation.  She also has pulmonary hypertension.  After discussing the risk and benefit of cardiac catheterization she was brought into the Cath Lab for right and left heart cath.    PROCEDURE IN DETAIL  Informed consent was obtained from the patient after explaining the risks, benefits, and alternative options of the procedure. After obtaining informed consent, the patient was brought to the cath lab and was prepped in a sterile fashion. Lidocaine 1% was used for local anesthesia into the right femoral venous access site. Right femoral venous vein was accessed using the micropuncture needle under ultrasound guidance and micropuncture wire advanced under flouroscopy. A 7 Faroese vascular sheath was put into place percutaneously over guide-wire. Guide wires were removed. A 7Fr swan rosalee catheter was advanced to wedge position. RA, RV and PA and wedge pressures were recorded. PA sat and arterial sats recorded and the swan was subsequently removed in its entirety.  Of note the right common femoral arterial access was also obtained however there is 100% occlusion of iliac artery on the right side. Next, The right radial artery was accessed with an angiocath needle under ultrasound guidance. A 6F slendersheath was inserted successfully.  Afterwards, 6F JR4 diagnostic catheter was advanced over a wire into the ascending aorta and was used to cross the AV and obtain LV pressures.  Gradient across the AV measured via pullback technique.  Next, 6F JL3.5 and JR4 catheters were used to engage the ostia of the left main and RCA respectively. Images of the right and left coronary systems were obtained.    CORONARY ANGIOGRAM FINDINGS:  Dominance: Right    #Left main: Left  main is a large vessel Which gives rise to the LAD left circumflex artery.  Left main has diffuse 20 to 30% disease.    #Left Anterior Descending Artery: LAD is a large caliber vessel which gives rise to several septal perforators and several diagonal branches.  Mid LAD has diffuse disease including 70 to 80% stenosis in the midsegment.  First diagonal has proximal 50 to 60% stenosis, second diagonal has ostial 80% stenosis.    #Left Circumflex: The LCx is a small caliber vessel with diffuse nonobstructive disease    #Right Coronary Artery: The RCA is a large, dominant vessel which gives rise to several small caliber branches and the PDA and PLV.  Mid RCA has 99% thrombotic occlusion.  PARUL-3 flow      [facility] HEMODYNAMICS:  LVp 119/12, 20 mmHg  AOp 110/51, 73 mmHg    No significant gradient across the aortic valve during pullback of JR4 catheter.  LV gram was not performed due to pending echocardiogram    [facility] HEMODYNAMICS:  RA 13/11, 12 mmHg  RV 38/6, 13 mmHg  PA 39/18, 26 mmHg  PCW 24/19, 18 mmHg  AO Sat 93%  PA Sat 63%    Smith CO 5.22 L/min    Smith CI 2.67 L/min/m²    Percutaneous coronary intervention  100 units/kg of heparin was administered and ACT of more than 250 was documented.  A 6 Brazilian JR4 guide with sideholes was used to engage the ostium of the right coronary artery.  I then advanced a 0.014 run-through wire into the distal RCA.  This was followed by predilation of mid RCA with two 5 x 15 mm compliant balloon.  After this advanced intravascular ultrasound catheter into the distal RCA and slow manual pullback was performed.  This showed mid RCA measurement 3.5 mm and proximal RCA 4 mm.  IVUS also confirmed thrombotic lesion with plaque rupture in the midsegment.  I then placed 2 overlapping stents 3.5 x 33 mm followed by 4 x 38 mm Xience peyman point stent both deployed at 12 jessica.  I then advanced a 4 mm x 15 mm noncompliant balloon and postdilated both stents at 16 jessica.  Final angiography showed 0% stenosis  and PARUL-3 flow in the RCA.  Final IVUS run showed complete expansion of the stent and excellent apposition.    The catheters were exchanged over a wire and subsequently removed. The patient tolerated the procedure well without any complications. The pictures were reviewed at the end of the procedure. A TR band was applied.    ESTIMATED BLOOD LOSS:  20 ml    COMPLICATIONS:  None    PROCEDURE DATA:  Sedation Time: 75 minutes    IMPRESSIONS  Two-vessel obstructive coronary disease involving LAD and RCA  IVUS guided PCI with drug-eluting stent placement of culprit vessel RCA  Mild pulmonary hypertension with elevated LVEDP    RECOMMENDATIONS  -- Dual antiplatelet therapy  -- Needs better blood pressure control  -- Smoking cessation and cardiac rehab  -Medical management of LAD disease.         Other:         ASSESSMENT & PLAN:    Principal Problem:    Unstable angina  Active Problems:    NSTEMI, initial episode of care      NSTEMI  She has typical chest pain with ECG changes as well as mildly elevated troponin.  Echocardiogram shows EF of 61 to 65%, diastolic dysfunction.  Cardiac catheterization showed critical RCA disease which was treated with PCI and drug-eluting stent placement.  Medical management for LAD disease.  Started dual antiplatelet therapy, high intensity statin and beta-blocker  She will benefit from cardiac rehab     Hypertension  Nitroglycerin drip has been stopped  We have started her on ACE inhibitor, beta-blocker and long-acting nitrate  Blood pressure is now well controlled     HFpEF  LVEDP elevated at 20 mmHg.  We will start diuretics maintenance dose.     Hyperlipidemia  , HDL 31, total cholesterol 210, VLDL 72.  Started high intensity statin  A1c is 5.3     Peripheral arterial disease  History of stents in bilateral lower extremities.  She will need elective LANDEN  Continue dual antiplatelet therapy and high intensity statin     Obesity  BMI is 34  She will benefit from elective screening  and treatment of sleep apnea  Diet, exercise, weight loss and lifestyle modification discussed with the patient     Chronic pain  Continue Norco     GERD  She reports a history of heartburn and acid reflux.  Continue PPI during inpatient stay.    Pedro Avila MD  05/12/23  07:52 EDT

## 2023-05-12 NOTE — CASE MANAGEMENT/SOCIAL WORK
Discharge Planning Assessment  [facility]     Patient Name: Radha Chang  MRN: [id number]  Today's Date: 5/12/2023    Admit Date: 5/10/2023    Plan: Return home with spouse         Discharge Plan     Row Name 05/12/23 1133       Plan    Plan Comments  spoke to patient at bedside. IMM letter reviewed with patient, verbal consent and copy left at bedside. Patient denies any d/c needs at this time.    Final Discharge Disposition Code 01 - home or self-care    Final Note Home                     Expected Discharge Date and Time     Expected Discharge Date Expected Discharge Time    May 12, 2023              Patient Forms     Row Name 05/12/23 1134       Patient Forms    Important Message from Medicare (IMM) Delivered  5/12/23    Delivered to Patient    Method of delivery In person  reviewed with patient, verbal consent and copy left at bedside              Met with patient in room. Maintained distance greater than six feet and spent less than 15 minutes in the room.      MATT CobbN, RN    [address] [address]    Office: [phone]  Fax: [phone]

## 2023-05-12 NOTE — PLAN OF CARE
Problem: Adult Inpatient Plan of Care  Goal: Plan of Care Review  Outcome: Met  Goal: Patient-Specific Goal (Individualized)  Outcome: Met  Goal: Absence of Hospital-Acquired Illness or Injury  Outcome: Met  Intervention: Identify and Manage Fall Risk  Recent Flowsheet Documentation  Taken 5/12/2023 1000 by Sonia Baca, RN  Safety Promotion/Fall Prevention:   safety round/check completed   clutter free environment maintained   assistive device/personal items within reach  Taken 5/12/2023 0800 by Sonia Baca RN  Safety Promotion/Fall Prevention:   safety round/check completed   clutter free environment maintained   assistive device/personal items within reach  Intervention: Prevent Infection  Recent Flowsheet Documentation  Taken 5/12/2023 1000 by Sonia Baca RN  Infection Prevention: personal protective equipment utilized  Taken 5/12/2023 0800 by Sonia Baca RN  Infection Prevention: personal protective equipment utilized  Goal: Optimal Comfort and Wellbeing  Outcome: Met  Intervention: Provide Person-Centered Care  Recent Flowsheet Documentation  Taken 5/12/2023 0800 by Sonia Baca RN  Trust Relationship/Rapport:   choices provided   care explained   questions encouraged   reassurance provided   thoughts/feelings acknowledged  Goal: Readiness for Transition of Care  Outcome: Met   Goal Outcome Evaluation:

## 2023-05-13 NOTE — OUTREACH NOTE
Prep Survey    Flowsheet Row Responses   [facility] facility patient discharged from? [facility]   Is LACE score < 7 ? No   Eligibility Readm Mgmt   Discharge diagnosis Unstable angina   Does the patient have one of the following disease processes/diagnoses(primary or secondary)? Acute MI (STEMI,NSTEMI)   Does the patient have Home health ordered? No   Is there a DME ordered? No   Medication alerts for this patient see AVS   Prep survey completed? Yes          Darlin ROSADO - Registered Nurse

## 2023-05-15 NOTE — CASE MANAGEMENT/SOCIAL WORK
Case Management Discharge Note      Final Note: Home         Selected Continued Care - Discharged on 5/12/2023 Admission date: 5/10/2023 - Discharge disposition: Home or Self Care            Transportation Services  Private: Car    Final Discharge Disposition Code: 01 - home or self-care

## 2023-05-17 ENCOUNTER — READMISSION MANAGEMENT (OUTPATIENT)
Dept: CALL CENTER | Facility: HOSPITAL | Age: 73
End: 2023-05-17
Payer: MEDICARE

## 2023-05-17 NOTE — OUTREACH NOTE
AMI Week 1 Survey    Flowsheet Row Responses   [facility] [place], operated by [facility] patient discharged from? [facility]   Does the patient have one of the following disease processes/diagnoses(primary or secondary)? Acute MI (STEMI,NSTEMI)   Week 1 attempt successful? Yes   Call start time 1509   Call end time 1520   Discharge diagnosis Unstable angina   Meds reviewed with patient/caregiver? Yes   Is the patient having any side effects they believe may be caused by any medication additions or changes? No   Does the patient have all prescriptions related to this admission filled (includes statins,anticoagulants,HTN meds,anti-arrhythmia meds) Yes   Is the patient taking all medications as directed (includes completed medication regime)? Yes   Does the patient have a primary care provider?  Yes   Does the patient have an appointment with their PCP,cardiologist,or clinic within 7 days of discharge? Yes   Has the patient kept scheduled appointments due by today? N/A   Psychosocial issues? No   Comments right groin- healing well and right radial healing well, denies pain or bruising. Pt denies chest pain.   Did the patient receive a copy of their discharge instructions? Yes   Nursing interventions Reviewed instructions with patient   What is the patient's perception of their health status since discharge? Improving   Nursing interventions Nurse provided patient education   Is the patient/caregiver able to teach back signs and symptoms of when to call for help immediately: Sudden chest discomfort, Sudden discomfort in arms, back, neck or jaw, Shortness of breath at any time   Nursing interventions Nurse provided patient education   Is the patient/caregiver able to teach back lifestyle changes to help prevent MIs Heart healthy diet, Quit smoking   Is the patient/caregiver able to teach back ways to prevent a second heart attack: Take medications, Follow up with MD   Is the patient/caregiver able to teach back the hierarchy of who to call/visit for  symptoms/problems? PCP, Specialist, Home health nurse, Urgent Care, ED, 911 Yes   Week 1 call completed? Yes   Revoked No further contact(revokes)-requires comment   Is the patient interested in additional calls from an ambulatory ?  NOTE:  applies to high risk patients requiring additional follow-up. No   Graduated/Revoked comments jer ROSADO - Registered Nurse

## 2023-05-25 NOTE — PROGRESS NOTES
Encounter Date:05/26/2023        Patient ID: Radha Chang is a 73 y.o. female.      Chief Complaint:      History of Present Illness  73 years old woman presented to the hospital with chest pain in May 2023.  She ruled in for non-ST elevation MI followed by a cardiac catheterization which showed critical disease in the RCA which was treated with PCI and drug-eluting stent placement.  LAD was medically managed.  Today she comes in with questions regarding cardiac rehab and her medications.    The following portions of the patient's history were reviewed and updated as appropriate: allergies, current medications, past family history, past medical history, past social history, past surgical history and problem list.    Review of Systems   Constitutional: Positive for malaise/fatigue.   Cardiovascular: Positive for leg swelling. Negative for chest pain, dyspnea on exertion and palpitations.   Respiratory: Positive for shortness of breath. Negative for cough.    Gastrointestinal: Negative for abdominal pain, nausea and vomiting.   Neurological: Negative for dizziness, focal weakness, headaches, light-headedness and numbness.   All other systems reviewed and are negative.        Current Outpatient Medications:   •  aspirin 81 MG chewable tablet, Chew 1 tablet Daily., Disp: , Rfl:   •  atenolol (TENORMIN) 50 MG tablet, Take 1 tablet by mouth Daily., Disp: 30 tablet, Rfl: 0  •  atorvastatin (LIPITOR) 80 MG tablet, Take 1 tablet by mouth Daily., Disp: 30 tablet, Rfl: 11  •  clopidogrel (PLAVIX) 75 MG tablet, Take 1 tablet by mouth Daily., Disp: 30 tablet, Rfl: 11  •  cyclobenzaprine (FLEXERIL) 10 MG tablet, Take 1 tablet by mouth 2 (Two) Times a Day As Needed., Disp: , Rfl:   •  docusate sodium (COLACE) 50 MG capsule, Take  by mouth As Needed for Constipation., Disp: , Rfl:   •  fenofibrate (TRICOR) 145 MG tablet, Take 160 mg by mouth Daily., Disp: , Rfl:   •  furosemide (LASIX) 40 MG tablet, Take 1 tablet by mouth  Daily., Disp: , Rfl:   •  gabapentin (NEURONTIN) 600 MG tablet, Take 1 tablet by mouth 3 (Three) Times a Day., Disp: , Rfl:   •  HYDROcodone-acetaminophen (NORCO)  MG per tablet, Take 1 tablet by mouth Every 4 (Four) Hours As Needed for Moderate Pain., Disp: , Rfl:   •  lisinopril (PRINIVIL,ZESTRIL) 2.5 MG tablet, Take 1 tablet by mouth Daily., Disp: 30 tablet, Rfl: 11  •  multivitamin with minerals tablet tablet, Take 1 tablet by mouth Daily., Disp: , Rfl:   •  omeprazole (priLOSEC) 20 MG capsule, Take 1 capsule by mouth 2 (two) times a day., Disp: , Rfl:     Current outpatient and discharge medications have been reconciled for the patient.  Reviewed by: Pedro Avila MD       Allergies   Allergen Reactions   • Kefzol [Cefazolin] Itching   • Niacin Itching       Family History   Problem Relation Age of Onset   • Cancer Mother         pancreatic   • Pancreatitis Sister        Past Surgical History:   Procedure Laterality Date   • ANGIOPLASTY Right     right leg stent placed   • APPENDECTOMY     • BACK SURGERY     • CARDIAC CATHETERIZATION     • CARDIAC CATHETERIZATION Bilateral 5/11/2023    Procedure: Right and Left Heart Cath;  Surgeon: Pedro Avila MD;  Location: [facility] CATH INVASIVE LOCATION;  Service: Cardiovascular;  Laterality: Bilateral;   • CARDIAC CATHETERIZATION N/A 5/11/2023    Procedure: Percutaneous Coronary Intervention;  Surgeon: Pedro Avila MD;  Location: [facility] CATH INVASIVE LOCATION;  Service: Cardiovascular;  Laterality: N/A;   • CHOLECYSTECTOMY WITH INTRAOPERATIVE CHOLANGIOGRAM N/A 11/6/2020    Procedure: CHOLECYSTECTOMY LAPAROSCOPIC INTRAOPERATIVE CHOLANGIOGRAM;  Surgeon: Dipesh Rodrigues MD;  Location: [facility] MAIN OR;  Service: General;  Laterality: N/A;  Gallbladder punctured during procedure   • HYSTERECTOMY     • INTERVENTIONAL RADIOLOGY PROCEDURE N/A 5/11/2023    Procedure: Intravascular Ultrasound;  Surgeon: Pedro Avila MD;  Location: [facility] CATH INVASIVE LOCATION;  Service:  "Cardiovascular;  Laterality: N/A;   • ROTATOR CUFF REPAIR Right        Past Medical History:   Diagnosis Date   • Arthritis    • Chronic coronary artery disease 5/12/2023   • Chronic low back pain 5/12/2023   • Chronic narcotic dependence 5/12/2023   • Coronary artery disease    • GERD (gastroesophageal reflux disease)    • GERD without esophagitis 5/12/2023   • Hyperlipidemia    • Hypertension    • Obesity (BMI 30-39.9) 5/12/2023   • PAD (peripheral artery disease)    • S/P coronary artery stent placement 5/12/2023       Family History   Problem Relation Age of Onset   • Cancer Mother         pancreatic   • Pancreatitis Sister        Social History     Socioeconomic History   • Marital status:    Tobacco Use   • Smoking status: Every Day     Packs/day: 1.00     Types: Cigarettes   • Smokeless tobacco: Never   Vaping Use   • Vaping Use: Never used   Substance and Sexual Activity   • Alcohol use: Not Currently   • Drug use: Never   • Sexual activity: Defer               Objective:       Physical Exam    /45 (BP Location: Right arm, Patient Position: Sitting, Cuff Size: Large Adult)   Pulse 66   Ht 162.6 cm (64\")   Wt 89.8 kg (198 lb)   LMP  (LMP Unknown)   SpO2 98%   BMI 33.99 kg/m²   The patient is alert, oriented and in no distress.    Vital signs as noted above.    Head and neck revealed no carotid bruits or jugular venous distension.  No thyromegaly or lymphadenopathy is present.    Lungs clear.  No wheezing.  Breath sounds are normal bilaterally.    Heart normal first and second heart sounds.  No murmur..  No pericardial rub is present.  No gallop is present.    Abdomen soft and nontender.  No organomegaly is present.    Extremities revealed good peripheral pulses without any pedal edema.    Skin warm and dry.    Musculoskeletal system is grossly normal.    CNS grossly normal.           Diagnosis Plan   1. Atherosclerosis of native arteries of extremities with intermittent claudication, right " leg        2. NSTEMI, initial episode of care  Ambulatory Referral to Cardiac Rehab      3. Primary hypertension        4. Mixed hyperlipidemia        5. PAD (peripheral artery disease)        6. Chronic coronary artery disease        7. Class 1 obesity due to excess calories without serious comorbidity with body mass index (BMI) of 33.0 to 33.9 in adult        8. Chronic GERD        LAB RESULTS (LAST 7 DAYS)    CBC        BMP        CMP         BNP        TROPONIN        CoAg        Creatinine Clearance  Estimated Creatinine Clearance: 83.6 mL/min (by C-G formula based on SCr of 0.64 mg/dL).    ABG        Radiology  No radiology results for the last day    EKG  Procedures    Stress test      Echocardiogram  Results for orders placed during the hospital encounter of 05/10/23    Adult Transthoracic Echo Complete W/ Cont if Necessary Per Protocol    Interpretation Summary  •  Left ventricular ejection fraction appears to be 61 - 65%.  •  Left ventricular diastolic dysfunction is noted.  •  Estimated right ventricular systolic pressure from tricuspid regurgitation is normal (<35 mmHg).  •  No significant valvular abnormalities.      Cardiac catheterization  Results for orders placed during the hospital encounter of 05/10/23    Cardiac Catheterization/Vascular Study    Narrative  OPERATORS  Pedro Avila M.D. (Attending Cardiologist)      PROCEDURE PERFORMED  Ultrasound guided vascular access  Right heart Catheterization  Left Heart Catheterization  Coronary Angiogram 56492  PCI with AIDA placement of culprit vessel RCA 54197  IVUS of RCA 60639  Moderate Sedation 75 minutes    INDICATIONS FOR PROCEDURE  72 years old woman with coronary artery disease, peripheral arterial disease, ongoing smoking presented with acute MI, chest pain and troponin elevation.  She also has pulmonary hypertension.  After discussing the risk and benefit of cardiac catheterization she was brought into the Cath Lab for right and left heart  cath.    PROCEDURE IN DETAIL  Informed consent was obtained from the patient after explaining the risks, benefits, and alternative options of the procedure. After obtaining informed consent, the patient was brought to the cath lab and was prepped in a sterile fashion. Lidocaine 1% was used for local anesthesia into the right femoral venous access site. Right femoral venous vein was accessed using the micropuncture needle under ultrasound guidance and micropuncture wire advanced under flouroscopy. A 7 Armenian vascular sheath was put into place percutaneously over guide-wire. Guide wires were removed. A 7Fr swan rosalee catheter was advanced to wedge position. RA, RV and PA and wedge pressures were recorded. PA sat and arterial sats recorded and the swan was subsequently removed in its entirety.  Of note the right common femoral arterial access was also obtained however there is 100% occlusion of iliac artery on the right side. Next, The right radial artery was accessed with an angiocath needle under ultrasound guidance. A 6F slendersheath was inserted successfully.  Afterwards, 6F JR4 diagnostic catheter was advanced over a wire into the ascending aorta and was used to cross the AV and obtain LV pressures.  Gradient across the AV measured via pullback technique.  Next, 6F JL3.5 and JR4 catheters were used to engage the ostia of the left main and RCA respectively. Images of the right and left coronary systems were obtained.    CORONARY ANGIOGRAM FINDINGS:  Dominance: Right    #Left main: Left main is a large vessel Which gives rise to the LAD left circumflex artery.  Left main has diffuse 20 to 30% disease.    #Left Anterior Descending Artery: LAD is a large caliber vessel which gives rise to several septal perforators and several diagonal branches.  Mid LAD has diffuse disease including 70 to 80% stenosis in the midsegment.  First diagonal has proximal 50 to 60% stenosis, second diagonal has ostial 80% stenosis.    #Left  Circumflex: The LCx is a small caliber vessel with diffuse nonobstructive disease    #Right Coronary Artery: The RCA is a large, dominant vessel which gives rise to several small caliber branches and the PDA and PLV.  Mid RCA has 99% thrombotic occlusion.  PARUL-3 flow      [facility] HEMODYNAMICS:  LVp 119/12, 20 mmHg  AOp 110/51, 73 mmHg    No significant gradient across the aortic valve during pullback of JR4 catheter.  LV gram was not performed due to pending echocardiogram    [facility] HEMODYNAMICS:  RA 13/11, 12 mmHg  RV 38/6, 13 mmHg  PA 39/18, 26 mmHg  PCW 24/19, 18 mmHg  AO Sat 93%  PA Sat 63%    Smith CO 5.22 L/min    Smith CI 2.67 L/min/m²    Percutaneous coronary intervention  100 units/kg of heparin was administered and ACT of more than 250 was documented.  A 6 Danish JR4 guide with sideholes was used to engage the ostium of the right coronary artery.  I then advanced a 0.014 run-through wire into the distal RCA.  This was followed by predilation of mid RCA with two 5 x 15 mm compliant balloon.  After this advanced intravascular ultrasound catheter into the distal RCA and slow manual pullback was performed.  This showed mid RCA measurement 3.5 mm and proximal RCA 4 mm.  IVUS also confirmed thrombotic lesion with plaque rupture in the midsegment.  I then placed 2 overlapping stents 3.5 x 33 mm followed by 4 x 38 mm Xience peyman point stent both deployed at 12 jessica.  I then advanced a 4 mm x 15 mm noncompliant balloon and postdilated both stents at 16 jessica.  Final angiography showed 0% stenosis and PARUL-3 flow in the RCA.  Final IVUS run showed complete expansion of the stent and excellent apposition.    The catheters were exchanged over a wire and subsequently removed. The patient tolerated the procedure well without any complications. The pictures were reviewed at the end of the procedure. A TR band was applied.    ESTIMATED BLOOD LOSS:  20 ml    COMPLICATIONS:  None    PROCEDURE DATA:  Sedation Time: 75  minutes    IMPRESSIONS  Two-vessel obstructive coronary disease involving LAD and RCA  IVUS guided PCI with drug-eluting stent placement of culprit vessel RCA  Mild pulmonary hypertension with elevated LVEDP    RECOMMENDATIONS  -- Dual antiplatelet therapy  -- Needs better blood pressure control  -- Smoking cessation and cardiac rehab  -Medical management of LAD disease.          Assessment and Plan       Diagnoses and all orders for this visit:    1. Atherosclerosis of native arteries of extremities with intermittent claudication, right leg (Primary)    2. NSTEMI, initial episode of care  Overview:  Added automatically from request for surgery [id number]    Orders:  -     Ambulatory Referral to Cardiac Rehab    3. Primary hypertension    4. Mixed hyperlipidemia    5. PAD (peripheral artery disease)    6. Chronic coronary artery disease    7. Class 1 obesity due to excess calories without serious comorbidity with body mass index (BMI) of 33.0 to 33.9 in adult    8. Chronic GERD       NSTEMI  She has typical chest pain with ECG changes as well as mildly elevated troponin.  Echocardiogram shows EF of 61 to 65%, diastolic dysfunction.  Cardiac catheterization showed critical RCA disease which was treated with PCI and drug-eluting stent placement.  Medical management for LAD disease.  Continue dual antiplatelet therapy, high intensity statin and beta-blocker  I will make a referral to cardiac rehab today.     Hypertension  We have started her on ACE inhibitor, beta-blocker and long-acting nitrate  Blood pressure is now well controlled and heart rate continue Lasix     HFpEF  LVEDP elevated at 20 mmHg.  We will start diuretics maintenance dose.     Hyperlipidemia  , HDL 31, total cholesterol 210, VLDL 72.  Continue atorvastatin and fenofibrate  A1c is 5.3     Peripheral arterial disease  History of stents in bilateral lower extremities.  She will need elective LANDEN  Continue dual antiplatelet therapy and high  intensity statin.     Obesity  BMI is 34  She will benefit from elective screening and treatment of sleep apnea  Diet, exercise, weight loss and lifestyle modification discussed with the patient     Chronic pain  Continue Norco.     GERD  She reports a history of heartburn and acid reflux.  Continue PPI during inpatient stay.

## 2023-05-26 ENCOUNTER — OFFICE VISIT (OUTPATIENT)
Dept: CARDIOLOGY | Facility: CLINIC | Age: 73
End: 2023-05-26
Payer: MEDICARE

## 2023-05-26 VITALS
OXYGEN SATURATION: 98 % | BODY MASS INDEX: 33.8 KG/M2 | DIASTOLIC BLOOD PRESSURE: 45 MMHG | SYSTOLIC BLOOD PRESSURE: 136 MMHG | HEART RATE: 66 BPM | HEIGHT: 64 IN | WEIGHT: 198 LBS

## 2023-05-26 DIAGNOSIS — I25.10 CHRONIC CORONARY ARTERY DISEASE: Chronic | ICD-10-CM

## 2023-05-26 DIAGNOSIS — E78.2 MIXED HYPERLIPIDEMIA: ICD-10-CM

## 2023-05-26 DIAGNOSIS — I73.9 PAD (PERIPHERAL ARTERY DISEASE): Chronic | ICD-10-CM

## 2023-05-26 DIAGNOSIS — E66.09 CLASS 1 OBESITY DUE TO EXCESS CALORIES WITHOUT SERIOUS COMORBIDITY WITH BODY MASS INDEX (BMI) OF 33.0 TO 33.9 IN ADULT: ICD-10-CM

## 2023-05-26 DIAGNOSIS — I70.211 ATHEROSCLEROSIS OF NATIVE ARTERIES OF EXTREMITIES WITH INTERMITTENT CLAUDICATION, RIGHT LEG: Primary | ICD-10-CM

## 2023-05-26 DIAGNOSIS — K21.9 CHRONIC GERD: ICD-10-CM

## 2023-05-26 DIAGNOSIS — I21.4 NSTEMI, INITIAL EPISODE OF CARE: ICD-10-CM

## 2023-05-26 DIAGNOSIS — I10 PRIMARY HYPERTENSION: ICD-10-CM

## 2023-05-26 RX ORDER — CLOPIDOGREL BISULFATE 75 MG/1
75 TABLET ORAL DAILY
Qty: 90 TABLET | Refills: 3 | Status: SHIPPED | OUTPATIENT
Start: 2023-05-26

## 2023-05-26 RX ORDER — ATORVASTATIN CALCIUM 80 MG/1
80 TABLET, FILM COATED ORAL DAILY
Qty: 90 TABLET | Refills: 3 | Status: SHIPPED | OUTPATIENT
Start: 2023-05-26

## 2023-05-26 RX ORDER — FUROSEMIDE 40 MG/1
40 TABLET ORAL DAILY
Qty: 90 TABLET | Refills: 3 | Status: SHIPPED | OUTPATIENT
Start: 2023-05-26

## 2023-05-26 RX ORDER — GABAPENTIN 600 MG/1
600 TABLET ORAL 3 TIMES DAILY
COMMUNITY
Start: 2023-05-23

## 2023-05-26 RX ORDER — LISINOPRIL 2.5 MG/1
2.5 TABLET ORAL
Qty: 90 TABLET | Refills: 3 | Status: SHIPPED | OUTPATIENT
Start: 2023-05-26

## 2023-05-26 RX ORDER — FENOFIBRATE 145 MG/1
160 TABLET, COATED ORAL DAILY
Qty: 90 TABLET | Refills: 3 | Status: SHIPPED | OUTPATIENT
Start: 2023-05-26

## 2023-05-26 RX ORDER — ATENOLOL 50 MG/1
50 TABLET ORAL DAILY
Qty: 90 TABLET | Refills: 3 | Status: SHIPPED | OUTPATIENT
Start: 2023-05-26

## 2023-06-15 ENCOUNTER — TELEPHONE (OUTPATIENT)
Dept: CARDIOLOGY | Facility: CLINIC | Age: 73
End: 2023-06-15
Payer: MEDICARE

## 2023-06-15 NOTE — TELEPHONE ENCOUNTER
Caller: Radha Chang    Relationship to patient: Self    Best call back number: [phone]    Patient is needing: PATIENT STATED THAT DR MENDIETA REQUESTED FOR HER TO GO TO CARDIAC REHAB IN [place]. THERE IS A REFERRAL IN [facility] BUT PATIENT STATED WHEN SHE CALLED THE OFFICE THEY TOLD HER THEY NEEDED A NEW ONE.

## 2023-06-16 NOTE — TELEPHONE ENCOUNTER
Left pt voicemail to call Dr Avila's office back regarding cardiac rehab referral and trying to figure out which cardiac rehab office in [place] pt called and would like to go to.

## 2023-10-03 ENCOUNTER — TELEPHONE (OUTPATIENT)
Dept: CARDIOLOGY | Facility: CLINIC | Age: 73
End: 2023-10-03
Payer: MEDICARE

## 2023-10-03 RX ORDER — ICOSAPENT ETHYL 1000 MG/1
2 CAPSULE ORAL 2 TIMES DAILY WITH MEALS
Qty: 360 CAPSULE | Refills: 3 | Status: SHIPPED | OUTPATIENT
Start: 2023-10-03

## 2023-10-03 NOTE — TELEPHONE ENCOUNTER
Spoke with the patient and went over instructions and to continue atorvastatin & fenofibrate, pt understands.

## 2023-10-03 NOTE — TELEPHONE ENCOUNTER
Lipid panel reviewed. Patient will be started on Vascepa 2 g oral twice daily for elevated triglycerides. New prescription sent to her pharmacy on file. Please verify that she is still taking atorvastatin 80 mg daily and fenofibrate 145 mg daily.

## 2023-11-25 NOTE — PROGRESS NOTES
Encounter Date:11/27/2023        Patient ID: Radha Chang is a 73 y.o. female.      Chief Complaint:      History of Present Illness  73 years old woman presented to the hospital with chest pain in May 2023.  She ruled in for non-ST elevation MI followed by a cardiac catheterization which showed critical disease in the RCA which was treated with PCI and drug-eluting stent placement.  LAD was medically managed.  She comes in today and continues to smoke.  She reports compliance with her medications.    The following portions of the patient's history were reviewed and updated as appropriate: allergies, current medications, past family history, past medical history, past social history, past surgical history, and problem list.    Review of Systems   Constitutional: Negative for malaise/fatigue.   Cardiovascular:  Negative for chest pain, dyspnea on exertion, leg swelling and palpitations.   Respiratory:  Negative for cough and shortness of breath.    Gastrointestinal:  Negative for abdominal pain, nausea and vomiting.   Neurological:  Negative for dizziness, focal weakness, headaches, light-headedness and numbness.   All other systems reviewed and are negative.        Current Outpatient Medications:     aspirin 81 MG chewable tablet, Chew 1 tablet Daily., Disp: , Rfl:     atenolol (TENORMIN) 50 MG tablet, Take 1 tablet by mouth Daily., Disp: 90 tablet, Rfl: 3    atorvastatin (LIPITOR) 80 MG tablet, Take 1 tablet by mouth Daily., Disp: 90 tablet, Rfl: 3    clopidogrel (PLAVIX) 75 MG tablet, Take 1 tablet by mouth Daily., Disp: 90 tablet, Rfl: 3    cyclobenzaprine (FLEXERIL) 10 MG tablet, Take 1 tablet by mouth 2 (Two) Times a Day As Needed., Disp: , Rfl:     docusate sodium (COLACE) 50 MG capsule, Take  by mouth As Needed for Constipation., Disp: , Rfl:     fenofibrate (TRICOR) 145 MG tablet, Take 1 tablet by mouth Daily., Disp: 90 tablet, Rfl: 3    furosemide (LASIX) 40 MG tablet, Take 1 tablet by mouth Daily.,  Disp: 90 tablet, Rfl: 3    HYDROcodone-acetaminophen (NORCO)  MG per tablet, Take 1 tablet by mouth Every 4 (Four) Hours As Needed for Moderate Pain., Disp: , Rfl:     icosapent ethyl (Vascepa) 1 g capsule capsule, Take 2 g by mouth 2 (Two) Times a Day With Meals., Disp: 360 capsule, Rfl: 3    lisinopril (PRINIVIL,ZESTRIL) 2.5 MG tablet, Take 1 tablet by mouth Daily., Disp: 90 tablet, Rfl: 3    multivitamin with minerals tablet tablet, Take 1 tablet by mouth Daily., Disp: , Rfl:     pantoprazole (PROTONIX) 40 MG EC tablet, Take 1 tablet by mouth Daily., Disp: , Rfl:     gabapentin (NEURONTIN) 600 MG tablet, Take 1 tablet by mouth 3 (Three) Times a Day. (Patient not taking: Reported on 11/27/2023), Disp: , Rfl:     omeprazole (priLOSEC) 20 MG capsule, Take 1 capsule by mouth 2 (two) times a day., Disp: , Rfl:     Current outpatient and discharge medications have been reconciled for the patient.  Reviewed by: Pedro Avila MD       Allergies   Allergen Reactions    Kefzol [Cefazolin] Itching    Niacin Itching       Family History   Problem Relation Age of Onset    Cancer Mother         pancreatic    Pancreatitis Sister        Past Surgical History:   Procedure Laterality Date    ANGIOPLASTY Right     right leg stent placed    APPENDECTOMY      BACK SURGERY      CARDIAC CATHETERIZATION      CARDIAC CATHETERIZATION Bilateral 5/11/2023    Procedure: Right and Left Heart Cath;  Surgeon: Pedro Avila MD;  Location: [facility] CATH INVASIVE LOCATION;  Service: Cardiovascular;  Laterality: Bilateral;    CARDIAC CATHETERIZATION N/A 5/11/2023    Procedure: Percutaneous Coronary Intervention;  Surgeon: Pedro Avila MD;  Location: [facility] CATH INVASIVE LOCATION;  Service: Cardiovascular;  Laterality: N/A;    CHOLECYSTECTOMY WITH INTRAOPERATIVE CHOLANGIOGRAM N/A 11/6/2020    Procedure: CHOLECYSTECTOMY LAPAROSCOPIC INTRAOPERATIVE CHOLANGIOGRAM;  Surgeon: Dipesh Rodrigues MD;  Location: [facility] MAIN OR;  Service: General;   "Laterality: N/A;  Gallbladder punctured during procedure    HYSTERECTOMY      INTERVENTIONAL RADIOLOGY PROCEDURE N/A 5/11/2023    Procedure: Intravascular Ultrasound;  Surgeon: Pedro Avila MD;  Location: [facility] INVASIVE LOCATION;  Service: Cardiovascular;  Laterality: N/A;    ROTATOR CUFF REPAIR Right        Past Medical History:   Diagnosis Date    Arthritis     Chronic coronary artery disease 5/12/2023    Chronic low back pain 5/12/2023    Chronic narcotic dependence 5/12/2023    Coronary artery disease     GERD (gastroesophageal reflux disease)     GERD without esophagitis 5/12/2023    Hyperlipidemia     Hypertension     Obesity (BMI 30-39.9) 5/12/2023    PAD (peripheral artery disease)     S/P coronary artery stent placement 5/12/2023       Family History   Problem Relation Age of Onset    Cancer Mother         pancreatic    Pancreatitis Sister        Social History     Socioeconomic History    Marital status:    Tobacco Use    Smoking status: Every Day     Packs/day: 1     Types: Cigarettes    Smokeless tobacco: Never   Vaping Use    Vaping Use: Never used   Substance and Sexual Activity    Alcohol use: Not Currently    Drug use: Never    Sexual activity: Defer               Objective:       Physical Exam    /57 (BP Location: Right arm, Patient Position: Sitting)   Pulse 69   Ht 162.6 cm (64\")   Wt 81.6 kg (180 lb)   LMP  (LMP Unknown)   SpO2 98%   BMI 30.90 kg/m²   The patient is alert, oriented and in no distress.    Vital signs as noted above.    Head and neck revealed no carotid bruits or jugular venous distension.  No thyromegaly or lymphadenopathy is present.    Lungs clear.  No wheezing.  Breath sounds are normal bilaterally.    Heart normal first and second heart sounds.  No murmur..  No pericardial rub is present.  No gallop is present.    Abdomen soft and nontender.  No organomegaly is present.    Extremities revealed good peripheral pulses without any pedal edema.    Skin " warm and dry.    Musculoskeletal system is grossly normal.    CNS grossly normal.           Diagnosis Plan   1. NSTEMI, initial episode of care        2. Primary hypertension        3. Mixed hyperlipidemia        4. Atherosclerosis of native arteries of extremities with intermittent claudication, right leg        5. PAD (peripheral artery disease)        6. Chronic coronary artery disease        7. Class 1 obesity due to excess calories without serious comorbidity with body mass index (BMI) of 33.0 to 33.9 in adult        8. Chronic GERD        LAB RESULTS (LAST 7 DAYS)    CBC        BMP        CMP         BNP        TROPONIN        CoAg        Creatinine Clearance  CrCl cannot be calculated (Patient's most recent lab result is older than the maximum 30 days allowed.).    ABG        Radiology  No radiology results for the last day    EKG  Procedures    Stress test      Echocardiogram  Results for orders placed during the hospital encounter of 05/10/23    Adult Transthoracic Echo Complete W/ Cont if Necessary Per Protocol    Interpretation Summary    Left ventricular ejection fraction appears to be 61 - 65%.    Left ventricular diastolic dysfunction is noted.    Estimated right ventricular systolic pressure from tricuspid regurgitation is normal (<35 mmHg).    No significant valvular abnormalities.      Cardiac catheterization  Results for orders placed during the hospital encounter of 05/10/23    Cardiac Catheterization/Vascular Study    Narrative  OPERATORS  Pedro Avila M.D. (Attending Cardiologist)      PROCEDURE PERFORMED  Ultrasound guided vascular access  Right heart Catheterization  Left Heart Catheterization  Coronary Angiogram 09255  PCI with AIDA placement of culprit vessel RCA 08150  IVUS of RCA 87791  Moderate Sedation 75 minutes    INDICATIONS FOR PROCEDURE  72 years old woman with coronary artery disease, peripheral arterial disease, ongoing smoking presented with acute MI, chest pain and troponin  elevation.  She also has pulmonary hypertension.  After discussing the risk and benefit of cardiac catheterization she was brought into the Cath Lab for right and left heart cath.    PROCEDURE IN DETAIL  Informed consent was obtained from the patient after explaining the risks, benefits, and alternative options of the procedure. After obtaining informed consent, the patient was brought to the cath lab and was prepped in a sterile fashion. Lidocaine 1% was used for local anesthesia into the right femoral venous access site. Right femoral venous vein was accessed using the micropuncture needle under ultrasound guidance and micropuncture wire advanced under flouroscopy. A 7 Trinidadian vascular sheath was put into place percutaneously over guide-wire. Guide wires were removed. A 7Fr swan rosalee catheter was advanced to wedge position. RA, RV and PA and wedge pressures were recorded. PA sat and arterial sats recorded and the swan was subsequently removed in its entirety.  Of note the right common femoral arterial access was also obtained however there is 100% occlusion of iliac artery on the right side. Next, The right radial artery was accessed with an angiocath needle under ultrasound guidance. A 6F slendersheath was inserted successfully.  Afterwards, 6F JR4 diagnostic catheter was advanced over a wire into the ascending aorta and was used to cross the AV and obtain LV pressures.  Gradient across the AV measured via pullback technique.  Next, 6F JL3.5 and JR4 catheters were used to engage the ostia of the left main and RCA respectively. Images of the right and left coronary systems were obtained.    CORONARY ANGIOGRAM FINDINGS:  Dominance: Right    #Left main: Left main is a large vessel Which gives rise to the LAD left circumflex artery.  Left main has diffuse 20 to 30% disease.    #Left Anterior Descending Artery: LAD is a large caliber vessel which gives rise to several septal perforators and several diagonal branches.   Mid LAD has diffuse disease including 70 to 80% stenosis in the midsegment.  First diagonal has proximal 50 to 60% stenosis, second diagonal has ostial 80% stenosis.    #Left Circumflex: The LCx is a small caliber vessel with diffuse nonobstructive disease    #Right Coronary Artery: The RCA is a large, dominant vessel which gives rise to several small caliber branches and the PDA and PLV.  Mid RCA has 99% thrombotic occlusion.  PARUL-3 flow      [facility] HEMODYNAMICS:  LVp 119/12, 20 mmHg  AOp 110/51, 73 mmHg    No significant gradient across the aortic valve during pullback of JR4 catheter.  LV gram was not performed due to pending echocardiogram    [facility] HEMODYNAMICS:  RA 13/11, 12 mmHg  RV 38/6, 13 mmHg  PA 39/18, 26 mmHg  PCW 24/19, 18 mmHg  AO Sat 93%  PA Sat 63%    Smith CO 5.22 L/min    Smith CI 2.67 L/min/m²    Percutaneous coronary intervention  100 units/kg of heparin was administered and ACT of more than 250 was documented.  A 6 Montenegrin JR4 guide with sideholes was used to engage the ostium of the right coronary artery.  I then advanced a 0.014 run-through wire into the distal RCA.  This was followed by predilation of mid RCA with two 5 x 15 mm compliant balloon.  After this advanced intravascular ultrasound catheter into the distal RCA and slow manual pullback was performed.  This showed mid RCA measurement 3.5 mm and proximal RCA 4 mm.  IVUS also confirmed thrombotic lesion with plaque rupture in the midsegment.  I then placed 2 overlapping stents 3.5 x 33 mm followed by 4 x 38 mm Xience peyman point stent both deployed at 12 jessica.  I then advanced a 4 mm x 15 mm noncompliant balloon and postdilated both stents at 16 jessica.  Final angiography showed 0% stenosis and PARUL-3 flow in the RCA.  Final IVUS run showed complete expansion of the stent and excellent apposition.    The catheters were exchanged over a wire and subsequently removed. The patient tolerated the procedure well without any complications. The pictures  were reviewed at the end of the procedure. A TR band was applied.    ESTIMATED BLOOD LOSS:  20 ml    COMPLICATIONS:  None    PROCEDURE DATA:  Sedation Time: 75 minutes    IMPRESSIONS  Two-vessel obstructive coronary disease involving LAD and RCA  IVUS guided PCI with drug-eluting stent placement of culprit vessel RCA  Mild pulmonary hypertension with elevated LVEDP    RECOMMENDATIONS  -- Dual antiplatelet therapy  -- Needs better blood pressure control  -- Smoking cessation and cardiac rehab  -Medical management of LAD disease.          Assessment and Plan       Diagnoses and all orders for this visit:    1. NSTEMI, initial episode of care (Primary)  Overview:  Added automatically from request for surgery [id number]      2. Primary hypertension    3. Mixed hyperlipidemia    4. Atherosclerosis of native arteries of extremities with intermittent claudication, right leg    5. PAD (peripheral artery disease)    6. Chronic coronary artery disease    7. Class 1 obesity due to excess calories without serious comorbidity with body mass index (BMI) of 33.0 to 33.9 in adult    8. Chronic GERD         NSTEMI  She has typical chest pain with ECG changes as well as mildly elevated troponin.  Echocardiogram shows EF of 61 to 65%, diastolic dysfunction.  Cardiac catheterization showed critical RCA disease which was treated with PCI and drug-eluting stent placement.  Medical management for LAD disease.  Can add Imdur if needed for chest pain.  Continue dual antiplatelet therapy, high intensity statin and beta-blocker  Currently chest pain-free.     Hypertension  We have started her on ACE inhibitor, beta-blocker  Blood pressure is now well controlled and heart rate   continue Lasix     HFpEF  LVEDP elevated at 20 mmHg.  Continue current doses of Lasix     Hyperlipidemia  , HDL 31, total cholesterol 210, VLDL 72.  Continue atorvastatin and Vascepa  A1c is 5.3     Peripheral arterial disease  History of stents in bilateral lower  extremities.  She will need elective LANDEN  Continue dual antiplatelet therapy and high intensity statin.     Obesity  BMI is 30.9.  She weighs 180 pounds.  She has lost nearly 20 pounds.  She will benefit from elective screening and treatment of sleep apnea  Diet, exercise, weight loss and lifestyle modification discussed with the patient     Chronic pain  Continue Norco.     GERD  She reports a history of heartburn and acid reflux.  Continue PPI during inpatient stay.    Smoking  Smoking cessation counseling provided to the patient   She is not ready to quit yet.  She will start to taper down smoking  CT chest to follow-up on nodules.  She may have COPD as well

## 2023-11-27 ENCOUNTER — OFFICE VISIT (OUTPATIENT)
Dept: CARDIOLOGY | Facility: CLINIC | Age: 73
End: 2023-11-27
Payer: MEDICARE

## 2023-11-27 VITALS
SYSTOLIC BLOOD PRESSURE: 139 MMHG | HEART RATE: 69 BPM | OXYGEN SATURATION: 98 % | DIASTOLIC BLOOD PRESSURE: 57 MMHG | WEIGHT: 180 LBS | HEIGHT: 64 IN | BODY MASS INDEX: 30.73 KG/M2

## 2023-11-27 DIAGNOSIS — K21.9 CHRONIC GERD: ICD-10-CM

## 2023-11-27 DIAGNOSIS — I73.9 PAD (PERIPHERAL ARTERY DISEASE): ICD-10-CM

## 2023-11-27 DIAGNOSIS — I21.4 NSTEMI, INITIAL EPISODE OF CARE: Primary | ICD-10-CM

## 2023-11-27 DIAGNOSIS — I70.211 ATHEROSCLEROSIS OF NATIVE ARTERIES OF EXTREMITIES WITH INTERMITTENT CLAUDICATION, RIGHT LEG: ICD-10-CM

## 2023-11-27 DIAGNOSIS — E66.09 CLASS 1 OBESITY DUE TO EXCESS CALORIES WITHOUT SERIOUS COMORBIDITY WITH BODY MASS INDEX (BMI) OF 33.0 TO 33.9 IN ADULT: ICD-10-CM

## 2023-11-27 DIAGNOSIS — I25.10 CHRONIC CORONARY ARTERY DISEASE: ICD-10-CM

## 2023-11-27 DIAGNOSIS — E78.2 MIXED HYPERLIPIDEMIA: ICD-10-CM

## 2023-11-27 DIAGNOSIS — I10 PRIMARY HYPERTENSION: ICD-10-CM

## 2023-11-27 PROCEDURE — 1159F MED LIST DOCD IN RCRD: CPT | Performed by: INTERNAL MEDICINE

## 2023-11-27 PROCEDURE — 99214 OFFICE O/P EST MOD 30 MIN: CPT | Performed by: INTERNAL MEDICINE

## 2023-11-27 PROCEDURE — 3075F SYST BP GE 130 - 139MM HG: CPT | Performed by: INTERNAL MEDICINE

## 2023-11-27 PROCEDURE — 1160F RVW MEDS BY RX/DR IN RCRD: CPT | Performed by: INTERNAL MEDICINE

## 2023-11-27 PROCEDURE — 3078F DIAST BP <80 MM HG: CPT | Performed by: INTERNAL MEDICINE

## 2023-11-27 RX ORDER — PANTOPRAZOLE SODIUM 40 MG/1
40 TABLET, DELAYED RELEASE ORAL DAILY
COMMUNITY

## 2023-12-08 ENCOUNTER — TELEPHONE (OUTPATIENT)
Dept: CARDIOLOGY | Facility: CLINIC | Age: 73
End: 2023-12-08
Payer: MEDICARE

## 2023-12-08 NOTE — TELEPHONE ENCOUNTER
FACILITY: [facility]  DR:   PHONE: [phone]  FAX: [phone]  PROCEDURE: Cataract  SCHEDULED: 12/11 & 01/04/24  MEDS TO HOLD: Plavix

## 2023-12-24 NOTE — Clinical Note
Time: 9:56 AM EST  Date of encounter:  12/24/2023  Independent Historian/Clinical History and Information was obtained by:   Patient    History is limited by: N/A    Chief Complaint: Chest discomfort      History of Present Illness:  Patient is a 87 y.o. year old male who presents to the emergency department for evaluation of chest pain that began last night.  He states the pain was worse when he would be supine and then it would resolve with sitting up.  He states he is presently pain-free.  He denies radiation of the pain he denies shortness of breath or diaphoresis or vomiting.  He denies similar types of pain.  He does have a history of aortic stenosis but no known history of coronary artery disease.  He denies having had prior cardiac catheterization.    HPI    Patient Care Team  Primary Care Provider: Akila Florence APRN    Past Medical History:     Allergies   Allergen Reactions    Gabapentin Unknown - Low Severity    Lovastatin Unknown - Low Severity    Lyrica [Pregabalin] Unknown - Low Severity    Nsaids GI Intolerance    Paroxetine Unknown - Low Severity     Past Medical History:   Diagnosis Date    Pineda's esophagus     Basal cell carcinoma     Compression fracture of first lumbar vertebra 2022    L1, L2, L5    Depression     GERD (gastroesophageal reflux disease)     Glaucoma     Hiatal hernia     History of BPH     Hyperlipidemia     Idiopathic peripheral neuropathy 2009    Nonrheumatic aortic valve stenosis     Osteoarthritis     Osteoporosis     Periodontal disease     Pneumothorax     S/P kyphoplasty     L5     Past Surgical History:   Procedure Laterality Date    APPENDECTOMY  1953    CATARACT EXTRACTION  2015    CHOLECYSTECTOMY  2000    COLONOSCOPY  09/2014    ENDOSCOPY  01/2020    Pineda's Esophagitis    TONSILLECTOMY AND ADENOIDECTOMY       Family History   Problem Relation Age of Onset    Colon cancer Neg Hx        Home Medications:  Prior to Admission medications    Medication Sig Start Date  Wire access to rt femoral artery.  Wire will not advance.  Angiography complete to femoral.  Myn[employer] device deployed. End Date Taking? Authorizing Provider   DULoxetine (CYMBALTA) 30 MG capsule Take 1 capsule by mouth Daily. 2/3/23  Yes Dariela Ram MD   DULoxetine (CYMBALTA) 60 MG capsule Take 1 capsule by mouth Daily.   Yes Dariela Ram MD   mirtazapine (REMERON) 15 MG tablet Take 1 tablet by mouth every night at bedtime. 2/13/23  Yes Dariela Ram MD   omeprazole (priLOSEC) 20 MG capsule TAKE ONE CAPSULE BY MOUTH 30 minutes BEFORE morning meal EVERY DAY 2/3/23  Yes Dariela Ram MD   tamsulosin (FLOMAX) 0.4 MG capsule 24 hr capsule tamsulosin 0.4 mg capsule   TAKE ONE CAPSULE BY MOUTH EVERY DAY   Yes Dariela Ram MD   vitamin D3 125 MCG (5000 UT) capsule capsule cholecalciferol (vitamin D3) 125 mcg (5,000 unit) capsule   TAKE ONE CAPSULE BY MOUTH EVERY DAY   Yes Dariela Ram MD   acetaminophen (TYLENOL) 325 MG tablet Take 650 mg by mouth Every 6 (Six) Hours As Needed for Mild Pain.    Dariela Ram MD   calcium carbonate EX (TUMS EX) 750 MG chewable tablet Chew 750 mg Daily.    Dariela Ram MD   cyanocobalamin (VITAMIN B-12) 1000 MCG tablet cyanocobalamin (vit B-12) 1,000 mcg tablet   TAKE ONE TABLET BY MOUTH EVERY DAY    Dariela Ram MD   fluticasone (FLONASE) 50 MCG/ACT nasal spray 2 sprays into the nostril(s) as directed by provider Daily.    Dariela Ram MD   HYDROcodone-acetaminophen (NORCO) 7.5-325 MG per tablet Every 6 (Six) Hours.    Dariela Ram MD   ketoconazole (NIZORAL) 2 % shampoo ketoconazole 2 % shampoo   USE AS DIRECTED THREE TIMES A WEEK    Dariela Ram MD   latanoprost (XALATAN) 0.005 % ophthalmic solution INSTILL ONE DROP INTO AFFECTED EYE EVERY EVENING 2/11/23   Dariela Ram MD   Milk of Magnesia 400 MG/5ML suspension TAKE 5ml BY MOUTH EVERY DAY AS NEEDED FOR no bowel movement FOR more THAN 3 DAYS 12/6/22   Dariela Ram MD   Nutritional Supplements (JOINT FORMULA PO) Take  by mouth.     "Provider, Historical, MD        Social History:   Social History     Tobacco Use    Smoking status: Former     Types: Cigarettes     Quit date: 1965     Years since quittin.0    Smokeless tobacco: Never   Vaping Use    Vaping Use: Never used   Substance Use Topics    Alcohol use: Not Currently    Drug use: Never         Review of Systems:  Review of Systems   Constitutional:  Negative for chills and fever.   HENT:  Negative for congestion, ear pain and sore throat.    Eyes:  Negative for pain.   Respiratory:  Negative for cough, chest tightness and shortness of breath.    Cardiovascular:  Positive for chest pain.   Gastrointestinal:  Negative for abdominal pain, diarrhea, nausea and vomiting.   Genitourinary:  Negative for flank pain and hematuria.   Musculoskeletal:  Negative for joint swelling.   Skin:  Negative for pallor.   Neurological:  Negative for seizures and headaches.   All other systems reviewed and are negative.       Physical Exam:  /88   Pulse 60   Temp 97.8 °F (36.6 °C) (Oral)   Resp 16   Ht 188 cm (74\")   Wt 79.7 kg (175 lb 11.3 oz)   SpO2 96%   BMI 22.56 kg/m²     Physical Exam  Constitutional:       Appearance: Normal appearance.   HENT:      Head: Normocephalic and atraumatic.      Nose: Nose normal.      Mouth/Throat:      Mouth: Mucous membranes are moist.   Eyes:      Extraocular Movements: Extraocular movements intact.      Conjunctiva/sclera: Conjunctivae normal.      Pupils: Pupils are equal, round, and reactive to light.   Cardiovascular:      Rate and Rhythm: Normal rate and regular rhythm.      Pulses: Normal pulses.      Heart sounds: Normal heart sounds.   Pulmonary:      Effort: Pulmonary effort is normal.      Breath sounds: Normal breath sounds. No wheezing.   Abdominal:      Palpations: Abdomen is soft.      Tenderness: There is no abdominal tenderness.   Musculoskeletal:         General: Normal range of motion.      Cervical back: Normal range of motion and neck " supple.      Right lower leg: No edema.      Left lower leg: No edema.   Skin:     General: Skin is warm and dry.      Capillary Refill: Capillary refill takes less than 2 seconds.      Findings: No rash.   Neurological:      General: No focal deficit present.      Mental Status: He is alert and oriented to person, place, and time. Mental status is at baseline.      Cranial Nerves: No cranial nerve deficit.      Sensory: No sensory deficit.      Motor: No weakness.   Psychiatric:         Mood and Affect: Mood normal.         Behavior: Behavior normal.                  Procedures:  Procedures      Medical Decision Making:      Comorbidities that affect care:    Pineda's esophagus    External Notes reviewed:    None      The following orders were placed and all results were independently analyzed by me:  Orders Placed This Encounter   Procedures    COVID-19, FLU A/B, RSV PCR 1 HR TAT - Swab, Nasopharynx    XR Chest 1 View    Beverly Draw    High Sensitivity Troponin T    Comprehensive Metabolic Panel    Lipase    BNP    Magnesium    CBC Auto Differential    High Sensitivity Troponin T 2Hr    D-dimer, Quantitative    NPO Diet NPO Type: Strict NPO    Undress & Gown    Continuous Pulse Oximetry    Oxygen Therapy- Nasal Cannula; Titrate 1-6 LPM Per SpO2; 90 - 95%    ECG 12 Lead ED Triage Standing Order; Chest Pain    ECG 12 Lead ED Triage Standing Order; Chest Pain    Insert Peripheral IV    CBC & Differential    Green Top (Gel)    Lavender Top    Gold Top - SST    Light Blue Top       Medications Given in the Emergency Department:  Medications   sodium chloride 0.9 % flush 10 mL (has no administration in time range)   aspirin chewable tablet 324 mg (324 mg Oral Not Given 12/24/23 0938)   pantoprazole (PROTONIX) injection 40 mg (40 mg Intravenous Given 12/24/23 1250)        ED Course:    ED Course as of 12/24/23 1401   Sun Dec 24, 2023   0945 EKG: Rate 72, premature atrial complexes with otherwise normal P waves, normal  QRS, nonspecific ST segment changes, normal QT interval, no previous for comparison. [RW]      ED Course User Index  [RW] Rambo Do MD       Labs:    Lab Results (last 24 hours)       Procedure Component Value Units Date/Time    High Sensitivity Troponin T [[id number]]  (Normal) Collected: 12/24/23 1001    Specimen: Blood Updated: 12/24/23 1030     HS Troponin T 11 ng/L     Narrative:      High Sensitive Troponin T Reference Range:  <14.0 ng/L- Negative Female for AMI  <22.0 ng/L- Negative Male for AMI  >=14 - Abnormal Female indicating possible myocardial injury.  >=22 - Abnormal Male indicating possible myocardial injury.   Clinicians would have to utilize clinical acumen, EKG, Troponin, and serial changes to determine if it is an Acute Myocardial Infarction or myocardial injury due to an underlying chronic condition.         CBC & Differential [[id number]]  (Normal) Collected: 12/24/23 1001    Specimen: Blood Updated: 12/24/23 1007    Narrative:      The following orders were created for panel order CBC & Differential.  Procedure                               Abnormality         Status                     ---------                               -----------         ------                     CBC Auto Differential[[id number]]        Normal              Final result                 Please view results for these tests on the individual orders.    Comprehensive Metabolic Panel [[id number]]  (Abnormal) Collected: 12/24/23 1001    Specimen: Blood Updated: 12/24/23 1030     Glucose 118 mg/dL      BUN 14 mg/dL      Creatinine 1.12 mg/dL      Sodium 136 mmol/L      Potassium 4.4 mmol/L      Chloride 100 mmol/L      CO2 25.4 mmol/L      Calcium 9.5 mg/dL      Total Protein 8.0 g/dL      Albumin 4.4 g/dL      ALT (SGPT) 33 U/L      AST (SGOT) 24 U/L      Alkaline Phosphatase 95 U/L      Total Bilirubin 0.7 mg/dL      Globulin 3.6 gm/dL      A/G Ratio 1.2 g/dL      BUN/Creatinine Ratio 12.5     Anion Gap 10.6 mmol/L      eGFR  63.2 mL/min/1.73     Narrative:      GFR Normal >60  Chronic Kidney Disease <60  Kidney Failure <15    The GFR formula is only valid for adults with stable renal function between ages 18 and 70.    Lipase [[id number]]  (Normal) Collected: 12/24/23 1001    Specimen: Blood Updated: 12/24/23 1030     Lipase 27 U/L     BNP [[id number]]  (Normal) Collected: 12/24/23 1001    Specimen: Blood Updated: 12/24/23 1028     proBNP 79.0 pg/mL     Narrative:      This assay is used as an aid in the diagnosis of individuals suspected of having heart failure. It can be used as an aid in the diagnosis of acute decompensated heart failure (ADHF) in patients presenting with signs and symptoms of ADHF to the emergency department (ED). In addition, NT-proBNP of <300 pg/mL indicates ADHF is not likely.    Age Range Result Interpretation  NT-proBNP Concentration (pg/mL:      <50             Positive            >450                   Gray                 300-450                    Negative             <300    50-75           Positive            >900                  Gray                300-900                  Negative            <300      >75             Positive            >1800                  Gray                300-1800                  Negative            <300    Magnesium [[id number]]  (Normal) Collected: 12/24/23 1001    Specimen: Blood Updated: 12/24/23 1030     Magnesium 2.3 mg/dL     CBC Auto Differential [[id number]]  (Normal) Collected: 12/24/23 1001    Specimen: Blood Updated: 12/24/23 1007     WBC 8.82 10*3/mm3      RBC 4.77 10*6/mm3      Hemoglobin 15.3 g/dL      Hematocrit 46.1 %      MCV 96.6 fL      MCH 32.1 pg      MCHC 33.2 g/dL      RDW 12.8 %      RDW-SD 45.7 fl      MPV 9.7 fL      Platelets 222 10*3/mm3      Neutrophil % 69.0 %      Lymphocyte % 22.9 %      Monocyte % 5.9 %      Eosinophil % 1.2 %      Basophil % 0.7 %      Immature Grans % 0.3 %      Neutrophils, Absolute 6.08 10*3/mm3      Lymphocytes, Absolute 2.02  "10*3/mm3      Monocytes, Absolute 0.52 10*3/mm3      Eosinophils, Absolute 0.11 10*3/mm3      Basophils, Absolute 0.06 10*3/mm3      Immature Grans, Absolute 0.03 10*3/mm3      nRBC 0.0 /100 WBC     D-dimer, Quantitative [[id number]]  (Normal) Collected: 12/24/23 1001    Specimen: Blood Updated: 12/24/23 1148     D-Dimer, Quantitative 0.76 MCGFEU/mL     Narrative:      According to the assay 's published package insert, a normal (<0.50 MCGFEU/mL) D-dimer result in conjunction with a non-high clinical probability assessment, excludes deep vein thrombosis (DVT) and pulmonary embolism (PE) with high sensitivity.    D-dimer values increase with age and this can make VTE exclusion of an older population difficult. To address this, the [employer], based on best available evidence and recent guidelines, recommends that clinicians use age-adjusted D-dimer thresholds in patients greater than 50 years of age with: a) a low probability of PE who do not meet all Pulmonary Embolism Rule Out Criteria, or b) in those with intermediate probability of PE.   The formula for an age-adjusted D-dimer cut-off is \"age/100\".  For example, a 60 year old patient would have an age-adjusted cut-off of 0.60 MCGFEU/mL and an 80 year old 0.80 MCGFEU/mL.    COVID-19, FLU A/B, RSV PCR 1 HR TAT - Swab, Nasopharynx [[id number]]  (Abnormal) Collected: 12/24/23 1031    Specimen: Swab from Nasopharynx Updated: 12/24/23 1116     COVID19 Detected     Influenza A PCR Not Detected     Influenza B PCR Not Detected     RSV, PCR Not Detected    Narrative:      Fact sheet for providers: https://www.fda.gov/media/492163/download    Fact sheet for patients: https://www.fda.gov/media/952231/download    Test performed by PCR.    High Sensitivity Troponin T 2Hr [[id number]]  (Normal) Collected: 12/24/23 1252    Specimen: Blood Updated: 12/24/23 1317     HS Troponin T 11 ng/L      Troponin T Delta 0 ng/L     Narrative:      High Sensitive " Troponin T Reference Range:  <14.0 ng/L- Negative Female for AMI  <22.0 ng/L- Negative Male for AMI  >=14 - Abnormal Female indicating possible myocardial injury.  >=22 - Abnormal Male indicating possible myocardial injury.   Clinicians would have to utilize clinical acumen, EKG, Troponin, and serial changes to determine if it is an Acute Myocardial Infarction or myocardial injury due to an underlying chronic condition.                  Imaging:    XR Chest 1 View    Result Date: 12/24/2023  PROCEDURE: XR CHEST 1 VW  COMPARISON: None  INDICATIONS: Chest Pain  FINDINGS:  There are subtle ill-defined airspace opacities throughout the lungs bilaterally with associated interstitial changes.  No pleural effusions are seen.  The cardiac silhouette is within normal limits for size.  Aortic atherosclerosis is noted.  No acute osseous abnormalities are identified.        Ill-defined multifocal airspace infiltrates bilaterally with associated interstitial changes indicating findings related to atypical/viral infection or multifocal pneumonia.  Recommend follow-up to ensure improvement/resolution.      RAEGAN SOTO MD       Electronically Signed and Approved By: RAEGAN SOTO MD on 12/24/2023 at 9:57                Differential Diagnosis and Discussion:    Chest Pain:  Based on the patient's signs and symptoms, I considered aortic dissection, myocardial infaction, pulmonary embolism, cardiac tamponade, pericarditis, pneumothorax, musculoskeletal chest pain and other differential diagnosis as an etiology of the patient's chest pain.     All labs were reviewed and interpreted by me.  All X-rays impressions were independently interpreted by me.  EKG was interpreted by me.    MDM     Amount and/or Complexity of Data Reviewed  Clinical lab tests: reviewed  Tests in the medicine section of CPT®: reviewed                 Patient Care Considerations:          Consultants/Shared Management Plan:    None    Social Determinants of  Health:    Patient has presented with family members who are responsible, reliable and will ensure follow up care.      Disposition and Care Coordination:    Discharged: I considered escalation of care by admitting this patient for observation, however the patient has improved and is suitable and  stable for discharge.    I have explained discharge medications and the need for follow up with the patient/caretakers. This was also printed in the discharge instructions. Patient was discharged with the following medications and follow up:      Medication List      No changes were made to your prescriptions during this visit.      Follow-up in December 26 with your cardiologist             Final diagnoses:   Gastroesophageal reflux disease with esophagitis without hemorrhage        ED Disposition       ED Disposition   Discharge    Condition   Stable    Comment   --               This medical record created using voice recognition software.             Rambo Do MD  12/24/23 [id number]

## 2024-01-09 ENCOUNTER — TELEPHONE (OUTPATIENT)
Dept: CARDIOLOGY | Facility: CLINIC | Age: 74
End: 2024-01-09
Payer: MEDICARE

## 2024-01-09 NOTE — TELEPHONE ENCOUNTER
I received a call from Kandis with [employer] clinical appeals dept and answered questions she had regarding the Rx for patient's Icosapent ethyl (Vascepa).   Kandis verbalized understanding.

## 2024-02-21 DIAGNOSIS — I25.10 CHRONIC CORONARY ARTERY DISEASE: ICD-10-CM

## 2024-02-21 DIAGNOSIS — E78.2 MIXED HYPERLIPIDEMIA: ICD-10-CM

## 2024-02-21 DIAGNOSIS — I10 PRIMARY HYPERTENSION: Primary | ICD-10-CM

## 2024-02-21 DIAGNOSIS — I73.9 PAD (PERIPHERAL ARTERY DISEASE): ICD-10-CM

## 2024-02-21 RX ORDER — ATENOLOL 50 MG/1
50 TABLET ORAL DAILY
Qty: 90 TABLET | Refills: 3 | Status: SHIPPED | OUTPATIENT
Start: 2024-02-21

## 2024-02-21 RX ORDER — FENOFIBRATE 145 MG/1
160 TABLET, COATED ORAL DAILY
Qty: 90 TABLET | Refills: 3 | Status: SHIPPED | OUTPATIENT
Start: 2024-02-21

## 2024-02-21 NOTE — TELEPHONE ENCOUNTER
Rx Refill Note  Requested Prescriptions     Pending Prescriptions Disp Refills    atenolol (TENORMIN) 50 MG tablet 90 tablet 3     Sig: Take 1 tablet by mouth Daily.    fenofibrate (TRICOR) 145 MG tablet 90 tablet 3     Sig: Take 1 tablet by mouth Daily.      Last office visit with prescribing clinician: 11/27/2023   Last telemedicine visit with prescribing clinician: Visit date not found   Next office visit with prescribing clinician: 5/31/2024                           Eloisa Brandt MA  02/21/24, 11:01 EST

## 2024-05-07 ENCOUNTER — TELEPHONE (OUTPATIENT)
Dept: NEUROSURGERY | Facility: CLINIC | Age: 74
End: 2024-05-07
Payer: MEDICARE

## 2024-05-09 DIAGNOSIS — I10 PRIMARY HYPERTENSION: ICD-10-CM

## 2024-05-09 DIAGNOSIS — I25.10 CHRONIC CORONARY ARTERY DISEASE: ICD-10-CM

## 2024-05-09 DIAGNOSIS — E78.2 MIXED HYPERLIPIDEMIA: ICD-10-CM

## 2024-05-09 DIAGNOSIS — I73.9 PAD (PERIPHERAL ARTERY DISEASE): ICD-10-CM

## 2024-05-09 RX ORDER — FENOFIBRATE 145 MG/1
145 TABLET, COATED ORAL DAILY
Qty: 90 TABLET | Refills: 1 | Status: SHIPPED | OUTPATIENT
Start: 2024-05-09

## 2024-05-09 RX ORDER — ATENOLOL 50 MG/1
50 TABLET ORAL DAILY
Qty: 90 TABLET | Refills: 1 | Status: SHIPPED | OUTPATIENT
Start: 2024-05-09

## 2024-05-14 NOTE — PROGRESS NOTES
"Subjective   History of Present Illness: Radha Chang is a 73 y.o. female is being seen for consultation today at the request of Christian Salvador,*for multiple myalgias most notably an acute 7-week history of \"tailbone\" pain.  She describes midline coccygeal pain that began 7 weeks ago with no inciting event.  She is now just getting to the point where she can sit down on it.  She does have chronic back and leg pain left over right, right hip pain and sometimes toe cramping.  She describes chronic issues in her left over right hip pain as well.  She is unable to lay on her left hip to sleep.  She has trouble going from sitting to standing position.  She feels like she is \"hunched\"over more when she walks but has no thoracic pain..  She has no remembrance of having any DEXA scan.  She does currently smoke.  She does have history of prior fusion 17 years ago at L4-L5.  With  for a disc \"rupture \".  She was referred here as there was some questioning of some lucency around her prior hardware.  She does utilize chronic narcotic therapy for pain relief.  She has not gone undergone any recent injection therapy.    History of Present Illness    The following portions of the patient's history were reviewed and updated as appropriate: allergies, current medications, past family history, past medical history, past social history, past surgical history, and problem list.    Review of Systems   Constitutional:  Positive for activity change.   HENT: Negative.     Eyes: Negative.    Respiratory: Negative.     Cardiovascular: Negative.    Gastrointestinal: Negative.    Endocrine: Negative.    Genitourinary: Negative.    Musculoskeletal:  Positive for arthralgias, back pain and myalgias.   Skin: Negative.    Allergic/Immunologic: Negative.    Neurological:  Positive for numbness (+tingling on her left side and goes to her toes).        Ache in lower back and does go into legs but not today    Hematological: " "Negative.    Psychiatric/Behavioral:  Positive for sleep disturbance.    All other systems reviewed and are negative.      Objective     .Resp 18   Ht 162.6 cm (64\")   Wt 79.4 kg (175 lb)   LMP  (LMP Unknown)   BMI 30.04 kg/m²    Body mass index is 30.04 kg/m².    Vitals:    05/15/24 1016   PainSc:   8   PainLoc: Back          Physical Exam  Neurologic Exam  Lower extremity motor strength 5/5  Positive Michaela finger on the left  Negative Drake  Tenderness along midline sacral coccygeal region.    Assessment & Plan   Independent Review of Radiographic Studies:      I personally reviewed the images from the following studies.    Sacrum coccyx 5/1/2024    No acute findings.  Moderate bilateral hip DJD.  Narrowing sclerosis and spurring of the SI joints.    Lumbar x-ray    Levocurvature with apex along T12 and L2.  Postsurgical changes noted with prior L4-L5 fusion.    Medical Decision Making:      Radha Null a 73 y.o. female being seen as a new patient for multitude of symptoms some chronic some acute.  I did review x-rays showing no acute findings along the sacrum and coccyx.  Her symptoms may be more related to her sacroiliac joint and less the coccyxgeal region.  She does have some adjacent segment disease changes above and below her prior fusion site with questionable hardware lucency.  This would be unusual for any lucency this late in fusion but I will obtain CT scan to rule out any hardware failure.  I would also like her to undergo DEXA scan as she does have osteopenic changes noted along her x-rays.  We will obtain flexion-extension imaging as well to rule out any dynamic instability.  I did discuss going forward after reviewing imaging and I did let patient know that she is not an optimal surgical candidate given her comorbidities but we will narrow down some core issues for possible injection therapy.  Patient agrees to plan of care and wishes to proceed.    Advanced imaging (i.e. MRI, CT scan, " or CT myelogram) was ordered today during your office visit. Once this order is approved by insurance, our office will call you ASAP in regards to appointment details for your test. If there is a waiting time on this, it is because we are waiting on approval from your insurance.     Please schedule a follow-up appointment to discuss your test results in the office.    For the fastest turn around time for your advanced imaging to be reviewed by a provider and uploaded into our system, pick a [facility] facility.     If you choose non-[facility] facility, you will be responsible for bringing the images on a CD to your follow-up appointment. If you forget the CD at the time of your appointment, you may be asked to reschedule.           There are no diagnoses linked to this encounter.  No follow-ups on file.    This patient was examined wearing appropriate personal protective equipment.     Radha Chang  reports that she has been smoking cigarettes. She has never used smokeless tobacco.      Body mass index is 30.04 kg/m².    BMI is >= 30 and <35. (Class 1 Obesity). Recommendations for exercise counseling/recommendations and nutrition counseling/recommendations   Patient's blood pressure was reviewed.  Recommendations for  a low-salt diet and exercise to maintain/improve BP in addition to taking any presribed medications.    Advance Care Planning   ACP discussion was held with the patient during this visit. Patient has an advance directive (not in EMR), copy requested.         Dwight Hayden MA  05/15/24  10:25 EDT

## 2024-05-15 ENCOUNTER — OFFICE VISIT (OUTPATIENT)
Dept: NEUROSURGERY | Facility: CLINIC | Age: 74
End: 2024-05-15
Payer: MEDICARE

## 2024-05-15 VITALS
WEIGHT: 175 LBS | BODY MASS INDEX: 29.88 KG/M2 | RESPIRATION RATE: 18 BRPM | DIASTOLIC BLOOD PRESSURE: 62 MMHG | HEART RATE: 68 BPM | OXYGEN SATURATION: 96 % | HEIGHT: 64 IN | SYSTOLIC BLOOD PRESSURE: 134 MMHG

## 2024-05-15 DIAGNOSIS — G89.29 CHRONIC BILATERAL LOW BACK PAIN WITHOUT SCIATICA: Primary | ICD-10-CM

## 2024-05-15 DIAGNOSIS — M53.3 ACUTE COCCYGEAL PAIN: ICD-10-CM

## 2024-05-15 DIAGNOSIS — M53.3 SACROILIAC PAIN: ICD-10-CM

## 2024-05-15 DIAGNOSIS — Z98.1 HISTORY OF LUMBAR FUSION: ICD-10-CM

## 2024-05-15 DIAGNOSIS — M85.88 OSTEOPENIA OF LUMBAR SPINE: ICD-10-CM

## 2024-05-15 DIAGNOSIS — M54.50 CHRONIC BILATERAL LOW BACK PAIN WITHOUT SCIATICA: Primary | ICD-10-CM

## 2024-05-15 PROCEDURE — 99204 OFFICE O/P NEW MOD 45 MIN: CPT | Performed by: NURSE PRACTITIONER

## 2024-05-15 PROCEDURE — 3078F DIAST BP <80 MM HG: CPT | Performed by: NURSE PRACTITIONER

## 2024-05-15 PROCEDURE — 1159F MED LIST DOCD IN RCRD: CPT | Performed by: NURSE PRACTITIONER

## 2024-05-15 PROCEDURE — 3075F SYST BP GE 130 - 139MM HG: CPT | Performed by: NURSE PRACTITIONER

## 2024-05-15 PROCEDURE — 1160F RVW MEDS BY RX/DR IN RCRD: CPT | Performed by: NURSE PRACTITIONER

## 2024-05-26 NOTE — PROGRESS NOTES
Encounter Date:05/31/2024        Patient ID: Radha Chang is a 74 y.o. female.      Chief Complaint:      History of Present Illness  74 years old woman presented to the hospital with chest pain in May 2023.  She ruled in for non-ST elevation MI followed by a cardiac catheterization which showed critical disease in the RCA which was treated with PCI and drug-eluting stent placement.  LAD was medically managed.  She comes in today and continues to smoke.  She reports compliance with her medications.  She comes in for follow-up and her current cardiac medications include aspirin, Plavix, atenolol, Lipitor, fenofibrate, Lasix, lisinopril/  She continues to smoke one third of pack per day.  She complains of cough and shortness of breath which she attributes to COPD.  She request for refills of her medications.       The following portions of the patient's history were reviewed and updated as appropriate: allergies, current medications, past family history, past medical history, past social history, past surgical history, and problem list.    Review of Systems   Constitutional: Positive for malaise/fatigue.   Cardiovascular:  Positive for dyspnea on exertion and leg swelling. Negative for chest pain and palpitations.   Respiratory:  Positive for shortness of breath. Negative for cough.    Gastrointestinal:  Negative for abdominal pain, nausea and vomiting.   Neurological:  Positive for light-headedness. Negative for dizziness, focal weakness, headaches and numbness.   All other systems reviewed and are negative.      Current Outpatient Medications:     aspirin 81 MG chewable tablet, Chew 1 tablet Daily., Disp: , Rfl:     atenolol (TENORMIN) 50 MG tablet, TAKE 1 TABLET BY MOUTH EVERY DAY, Disp: 90 tablet, Rfl: 1    atorvastatin (LIPITOR) 80 MG tablet, Take 1 tablet by mouth Daily., Disp: 90 tablet, Rfl: 3    clopidogrel (PLAVIX) 75 MG tablet, Take 1 tablet by mouth Daily., Disp: 90 tablet, Rfl: 3    cyclobenzaprine  (FLEXERIL) 10 MG tablet, Take 1 tablet by mouth 2 (Two) Times a Day As Needed., Disp: , Rfl:     docusate sodium (COLACE) 50 MG capsule, Take  by mouth As Needed for Constipation., Disp: , Rfl:     fenofibrate (TRICOR) 145 MG tablet, TAKE 1 TABLET BY MOUTH EVERY DAY, Disp: 90 tablet, Rfl: 1    furosemide (LASIX) 40 MG tablet, Take 1 tablet by mouth Daily., Disp: 90 tablet, Rfl: 3    gabapentin (NEURONTIN) 600 MG tablet, Take 1 tablet by mouth 3 (Three) Times a Day. (Patient not taking: Reported on 11/27/2023), Disp: , Rfl:     HYDROcodone-acetaminophen (NORCO)  MG per tablet, Take 1 tablet by mouth Every 4 (Four) Hours As Needed for Moderate Pain., Disp: , Rfl:     icosapent ethyl (Vascepa) 1 g capsule capsule, Take 2 g by mouth 2 (Two) Times a Day With Meals., Disp: 360 capsule, Rfl: 3    lisinopril (PRINIVIL,ZESTRIL) 2.5 MG tablet, Take 1 tablet by mouth Daily., Disp: 90 tablet, Rfl: 3    multivitamin with minerals tablet tablet, Take 1 tablet by mouth Daily., Disp: , Rfl:     omeprazole (priLOSEC) 20 MG capsule, Take 1 capsule by mouth 2 (two) times a day., Disp: , Rfl:     pantoprazole (PROTONIX) 40 MG EC tablet, Take 1 tablet by mouth Daily., Disp: , Rfl:     Current outpatient and discharge medications have been reconciled for the patient.  Reviewed by: Pedro Avila MD       Allergies   Allergen Reactions    Kefzol [Cefazolin] Itching    Niacin Itching       Family History   Problem Relation Age of Onset    Cancer Mother         pancreatic    Pancreatitis Sister        Past Surgical History:   Procedure Laterality Date    ANGIOPLASTY Right     right leg stent placed    APPENDECTOMY      BACK SURGERY      CARDIAC CATHETERIZATION      CARDIAC CATHETERIZATION Bilateral 5/11/2023    Procedure: Right and Left Heart Cath;  Surgeon: Pedro Avila MD;  Location: [facility] CATH INVASIVE LOCATION;  Service: Cardiovascular;  Laterality: Bilateral;    CARDIAC CATHETERIZATION N/A 5/11/2023    Procedure: Percutaneous  Coronary Intervention;  Surgeon: Pedro Avila MD;  Location: [facility] CATH INVASIVE LOCATION;  Service: Cardiovascular;  Laterality: N/A;    CHOLECYSTECTOMY WITH INTRAOPERATIVE CHOLANGIOGRAM N/A 11/6/2020    Procedure: CHOLECYSTECTOMY LAPAROSCOPIC INTRAOPERATIVE CHOLANGIOGRAM;  Surgeon: Dipesh Rodrigues MD;  Location: [facility] MAIN OR;  Service: General;  Laterality: N/A;  Gallbladder punctured during procedure    HYSTERECTOMY      INTERVENTIONAL RADIOLOGY PROCEDURE N/A 5/11/2023    Procedure: Intravascular Ultrasound;  Surgeon: Pedro Avila MD;  Location: [facility] CATH INVASIVE LOCATION;  Service: Cardiovascular;  Laterality: N/A;    ROTATOR CUFF REPAIR Right        Past Medical History:   Diagnosis Date    Arthritis     Chronic coronary artery disease 5/12/2023    Chronic low back pain 5/12/2023    Chronic narcotic dependence 5/12/2023    Coronary artery disease     GERD (gastroesophageal reflux disease)     GERD without esophagitis 5/12/2023    Hyperlipidemia     Hypertension     Obesity (BMI 30-39.9) 5/12/2023    PAD (peripheral artery disease)     S/P coronary artery stent placement 5/12/2023       Family History   Problem Relation Age of Onset    Cancer Mother         pancreatic    Pancreatitis Sister        Social History     Socioeconomic History    Marital status:    Tobacco Use    Smoking status: Every Day     Current packs/day: 1.00     Types: Cigarettes    Smokeless tobacco: Never   Vaping Use    Vaping status: Never Used   Substance and Sexual Activity    Alcohol use: Not Currently    Drug use: Never    Sexual activity: Defer               Objective:       Physical Exam    LMP  (LMP Unknown)   The patient is alert, oriented and in no distress.    Vital signs as noted above.    Head and neck revealed no carotid bruits or jugular venous distension.  No thyromegaly or lymphadenopathy is present.    Lungs clear.  No wheezing.  Breath sounds are normal bilaterally.    Heart normal first and second heart  sounds.  No murmur..  No pericardial rub is present.  No gallop is present.    Abdomen soft and nontender.  No organomegaly is present.    Extremities revealed good peripheral pulses without any pedal edema.    Skin warm and dry.    Musculoskeletal system is grossly normal.    CNS grossly normal.          No diagnosis found.LAB RESULTS (LAST 7 DAYS)    CBC        BMP        CMP         BNP        TROPONIN        CoAg        Creatinine Clearance  CrCl cannot be calculated (Patient's most recent lab result is older than the maximum 30 days allowed.).    ABG        Radiology  No radiology results for the last day    EKG    ECG 12 Lead    Date/Time: 5/31/2024 1:32 PM  Performed by: Pedro Avila MD    Authorized by: Pedro Avila MD  Comparison: compared with previous ECG   Similar to previous ECG  Comments: ECG shows normal sinus rhythm.  Heart rate 66, , QTc 479 and  ms.          Stress test      Echocardiogram  Results for orders placed during the hospital encounter of 05/10/23    Adult Transthoracic Echo Complete W/ Cont if Necessary Per Protocol    Interpretation Summary    Left ventricular ejection fraction appears to be 61 - 65%.    Left ventricular diastolic dysfunction is noted.    Estimated right ventricular systolic pressure from tricuspid regurgitation is normal (<35 mmHg).    No significant valvular abnormalities.      Cardiac catheterization  Results for orders placed during the hospital encounter of 05/10/23    Cardiac Catheterization/Vascular Study    Conclusion  OPERATORS  Pedro Avila M.D. (Attending Cardiologist)      PROCEDURE PERFORMED  Ultrasound guided vascular access  Right heart Catheterization  Left Heart Catheterization  Coronary Angiogram 10197  PCI with AIDA placement of culprit vessel RCA 98559  IVUS of RCA 56650  Moderate Sedation 75 minutes    INDICATIONS FOR PROCEDURE  72 years old woman with coronary artery disease, peripheral arterial disease, ongoing smoking presented with  acute MI, chest pain and troponin elevation.  She also has pulmonary hypertension.  After discussing the risk and benefit of cardiac catheterization she was brought into the Cath Lab for right and left heart cath.    PROCEDURE IN DETAIL  Informed consent was obtained from the patient after explaining the risks, benefits, and alternative options of the procedure. After obtaining informed consent, the patient was brought to the cath lab and was prepped in a sterile fashion. Lidocaine 1% was used for local anesthesia into the right femoral venous access site. Right femoral venous vein was accessed using the micropuncture needle under ultrasound guidance and micropuncture wire advanced under flouroscopy. A 7 Azeri vascular sheath was put into place percutaneously over guide-wire. Guide wires were removed. A 7Fr swan rosalee catheter was advanced to wedge position. RA, RV and PA and wedge pressures were recorded. PA sat and arterial sats recorded and the swan was subsequently removed in its entirety.  Of note the right common femoral arterial access was also obtained however there is 100% occlusion of iliac artery on the right side. Next, The right radial artery was accessed with an angiocath needle under ultrasound guidance. A 6F slendersheath was inserted successfully.  Afterwards, 6F JR4 diagnostic catheter was advanced over a wire into the ascending aorta and was used to cross the AV and obtain LV pressures.  Gradient across the AV measured via pullback technique.  Next, 6F JL3.5 and JR4 catheters were used to engage the ostia of the left main and RCA respectively. Images of the right and left coronary systems were obtained.    CORONARY ANGIOGRAM FINDINGS:  Dominance: Right    #Left main: Left main is a large vessel Which gives rise to the LAD left circumflex artery.  Left main has diffuse 20 to 30% disease.    #Left Anterior Descending Artery: LAD is a large caliber vessel which gives rise to several septal  perforators and several diagonal branches.  Mid LAD has diffuse disease including 70 to 80% stenosis in the midsegment.  First diagonal has proximal 50 to 60% stenosis, second diagonal has ostial 80% stenosis.    #Left Circumflex: The LCx is a small caliber vessel with diffuse nonobstructive disease    #Right Coronary Artery: The RCA is a large, dominant vessel which gives rise to several small caliber branches and the PDA and PLV.  Mid RCA has 99% thrombotic occlusion.  PARUL-3 flow      [facility] HEMODYNAMICS:  LVp 119/12, 20 mmHg  AOp 110/51, 73 mmHg    No significant gradient across the aortic valve during pullback of JR4 catheter.  LV gram was not performed due to pending echocardiogram    [facility] HEMODYNAMICS:  RA 13/11, 12 mmHg  RV 38/6, 13 mmHg  PA 39/18, 26 mmHg  PCW 24/19, 18 mmHg  AO Sat 93%  PA Sat 63%    Smith CO 5.22 L/min    Smith CI 2.67 L/min/m²    Percutaneous coronary intervention  100 units/kg of heparin was administered and ACT of more than 250 was documented.  A 6 Kinyarwanda JR4 guide with sideholes was used to engage the ostium of the right coronary artery.  I then advanced a 0.014 run-through wire into the distal RCA.  This was followed by predilation of mid RCA with two 5 x 15 mm compliant balloon.  After this advanced intravascular ultrasound catheter into the distal RCA and slow manual pullback was performed.  This showed mid RCA measurement 3.5 mm and proximal RCA 4 mm.  IVUS also confirmed thrombotic lesion with plaque rupture in the midsegment.  I then placed 2 overlapping stents 3.5 x 33 mm followed by 4 x 38 mm Xience peyman point stent both deployed at 12 jessica.  I then advanced a 4 mm x 15 mm noncompliant balloon and postdilated both stents at 16 jessica.  Final angiography showed 0% stenosis and PARUL-3 flow in the RCA.  Final IVUS run showed complete expansion of the stent and excellent apposition.    The catheters were exchanged over a wire and subsequently removed. The patient tolerated the procedure well  without any complications. The pictures were reviewed at the end of the procedure. A TR band was applied.    ESTIMATED BLOOD LOSS:  20 ml    COMPLICATIONS:  None    PROCEDURE DATA:  Sedation Time: 75 minutes    IMPRESSIONS  Two-vessel obstructive coronary disease involving LAD and RCA  IVUS guided PCI with drug-eluting stent placement of culprit vessel RCA  Mild pulmonary hypertension with elevated LVEDP    RECOMMENDATIONS  -- Dual antiplatelet therapy  -- Needs better blood pressure control  -- Smoking cessation and cardiac rehab  -Medical management of LAD disease.          Assessment and Plan       There are no diagnoses linked to this encounter.     NSTEMI  She has typical chest pain with ECG changes as well as mildly elevated troponin.  Echocardiogram shows EF of 61 to 65%, diastolic dysfunction.  Cardiac catheterization showed critical RCA disease which was treated with PCI and drug-eluting stent placement.  Medical management for LAD disease.  Can add Imdur if needed for chest pain.  Continue dual antiplatelet therapy, high intensity statin and beta-blocker  Currently chest pain-free.  Refills provided for prolonged dual antiplatelet therapy       Hypertension  We have started her on ACE inhibitor, beta-blocker  Blood pressure is now well controlled and heart rate   continue Lasix     HFpEF  LVEDP elevated at 20 mmHg.  Continue current doses of Lasix     Hyperlipidemia  , HDL 31, total cholesterol 210, VLDL 72.  Continue atorvastatin and fenofibrate  A1c is 5.3     Peripheral arterial disease  History of stents in bilateral lower extremities.  She will need elective LANDEN  Continue dual antiplatelet therapy and high intensity statin.     Obesity  BMI is 30. she weighs 175 pounds  She has lost 25 pounds  She will benefit from elective screening and treatment of sleep apnea  Diet, exercise, weight loss and lifestyle modification discussed with the patient     Chronic pain  Continue Norco.     GERD  She  reports a history of heartburn and acid reflux.  Continue PPI during inpatient stay.     Smoking  Smoking cessation counseling provided to the patient   She is not ready to quit yet.  She will start to taper down smoking  CT chest to follow-up on nodules.  She may have COPD as well

## 2024-05-31 ENCOUNTER — OFFICE VISIT (OUTPATIENT)
Dept: CARDIOLOGY | Facility: CLINIC | Age: 74
End: 2024-05-31
Payer: MEDICARE

## 2024-05-31 VITALS
HEIGHT: 64 IN | OXYGEN SATURATION: 96 % | DIASTOLIC BLOOD PRESSURE: 34 MMHG | WEIGHT: 175 LBS | BODY MASS INDEX: 29.88 KG/M2 | SYSTOLIC BLOOD PRESSURE: 115 MMHG | HEART RATE: 66 BPM

## 2024-05-31 DIAGNOSIS — E66.09 CLASS 1 OBESITY DUE TO EXCESS CALORIES WITHOUT SERIOUS COMORBIDITY WITH BODY MASS INDEX (BMI) OF 33.0 TO 33.9 IN ADULT: ICD-10-CM

## 2024-05-31 DIAGNOSIS — I25.10 CHRONIC CORONARY ARTERY DISEASE: ICD-10-CM

## 2024-05-31 DIAGNOSIS — F11.20 CHRONIC NARCOTIC DEPENDENCE: Chronic | ICD-10-CM

## 2024-05-31 DIAGNOSIS — E78.2 MIXED HYPERLIPIDEMIA: ICD-10-CM

## 2024-05-31 DIAGNOSIS — I70.211 ATHEROSCLEROSIS OF NATIVE ARTERIES OF EXTREMITIES WITH INTERMITTENT CLAUDICATION, RIGHT LEG: ICD-10-CM

## 2024-05-31 DIAGNOSIS — K21.9 CHRONIC GERD: ICD-10-CM

## 2024-05-31 DIAGNOSIS — I21.4 NSTEMI, INITIAL EPISODE OF CARE: Primary | ICD-10-CM

## 2024-05-31 DIAGNOSIS — I73.9 PAD (PERIPHERAL ARTERY DISEASE): ICD-10-CM

## 2024-05-31 DIAGNOSIS — I10 PRIMARY HYPERTENSION: ICD-10-CM

## 2024-05-31 RX ORDER — FUROSEMIDE 40 MG/1
40 TABLET ORAL DAILY
Qty: 90 TABLET | Refills: 3 | Status: SHIPPED | OUTPATIENT
Start: 2024-05-31

## 2024-05-31 RX ORDER — ATORVASTATIN CALCIUM 80 MG/1
80 TABLET, FILM COATED ORAL DAILY
Qty: 90 TABLET | Refills: 3 | Status: SHIPPED | OUTPATIENT
Start: 2024-05-31

## 2024-05-31 RX ORDER — ATENOLOL 50 MG/1
50 TABLET ORAL DAILY
Qty: 90 TABLET | Refills: 3 | Status: SHIPPED | OUTPATIENT
Start: 2024-05-31

## 2024-05-31 RX ORDER — FENOFIBRATE 145 MG/1
145 TABLET, COATED ORAL DAILY
Qty: 90 TABLET | Refills: 3 | Status: SHIPPED | OUTPATIENT
Start: 2024-05-31

## 2024-05-31 RX ORDER — LISINOPRIL 2.5 MG/1
2.5 TABLET ORAL
Qty: 90 TABLET | Refills: 3 | Status: SHIPPED | OUTPATIENT
Start: 2024-05-31

## 2024-05-31 RX ORDER — CLOPIDOGREL BISULFATE 75 MG/1
75 TABLET ORAL DAILY
Qty: 90 TABLET | Refills: 3 | Status: SHIPPED | OUTPATIENT
Start: 2024-05-31

## 2024-09-19 DIAGNOSIS — I73.9 PAD (PERIPHERAL ARTERY DISEASE): ICD-10-CM

## 2024-09-19 DIAGNOSIS — I21.4 NSTEMI, INITIAL EPISODE OF CARE: ICD-10-CM

## 2024-09-19 DIAGNOSIS — I25.10 CHRONIC CORONARY ARTERY DISEASE: ICD-10-CM

## 2024-09-19 DIAGNOSIS — I10 PRIMARY HYPERTENSION: ICD-10-CM

## 2024-09-19 DIAGNOSIS — E78.2 MIXED HYPERLIPIDEMIA: ICD-10-CM

## 2024-09-19 RX ORDER — ATORVASTATIN CALCIUM 80 MG/1
80 TABLET, FILM COATED ORAL DAILY
Qty: 90 TABLET | Refills: 3 | Status: SHIPPED | OUTPATIENT
Start: 2024-09-19

## 2024-09-19 RX ORDER — FUROSEMIDE 40 MG
40 TABLET ORAL DAILY
Qty: 90 TABLET | Refills: 3 | Status: SHIPPED | OUTPATIENT
Start: 2024-09-19

## 2024-09-19 RX ORDER — CLOPIDOGREL BISULFATE 75 MG/1
75 TABLET ORAL DAILY
Qty: 90 TABLET | Refills: 3 | Status: SHIPPED | OUTPATIENT
Start: 2024-09-19

## 2024-09-19 RX ORDER — LISINOPRIL 2.5 MG/1
2.5 TABLET ORAL DAILY
Qty: 90 TABLET | Refills: 3 | Status: SHIPPED | OUTPATIENT
Start: 2024-09-19

## 2024-10-01 RX ORDER — ICOSAPENT ETHYL 1 G/1
2 CAPSULE ORAL 2 TIMES DAILY WITH MEALS
Qty: 360 CAPSULE | Refills: 1 | Status: SHIPPED | OUTPATIENT
Start: 2024-10-01

## 2024-10-01 NOTE — TELEPHONE ENCOUNTER
Rx Refill Note  Requested Prescriptions     Pending Prescriptions Disp Refills    icosapent ethyl (VASCEPA) 1 g capsule capsule [Pharmacy Med Name: ICOSAPENT ETHYL 1GM CAPSULES] 360 capsule 1     Sig: TAKE 2 CAPSULES BY MOUTH TWICE DAILY WITH MEALS      Last office visit with prescribing clinician: Dr. Avila 5/31/24  Last telemedicine visit with prescribing clinician: Visit date not found   Next office visit with prescribing clinician: Dr. Avila 2/28/2025                        Would you like a call back once the refill request has been completed: [] Yes [] No    If the office needs to give you a call back, can they leave a voicemail: [] Yes [] No    Aydee De La Cruz MA  10/01/24, 10:06 EDT

## 2024-12-30 RX ORDER — ICOSAPENT ETHYL 1 G/1
2 CAPSULE ORAL 2 TIMES DAILY WITH MEALS
Qty: 360 CAPSULE | Refills: 1 | Status: SHIPPED | OUTPATIENT
Start: 2024-12-30

## 2024-12-30 NOTE — TELEPHONE ENCOUNTER
Patient called in about medication. She takes it four times a day and needs 360 capsules, but pharmacy only wants to give her 90.

## 2025-01-08 ENCOUNTER — TELEPHONE (OUTPATIENT)
Dept: NEUROSURGERY | Facility: CLINIC | Age: 75
End: 2025-01-08
Payer: MEDICARE

## 2025-01-08 NOTE — TELEPHONE ENCOUNTER
Called the patient to ask if she was able to get the imaging done at [facility] and she stated that the hospital never called her to get this completed. I stated I would resend it over to them and hopefully they will call so you can figure out a day to be able to complete this due to the weather. She stated she understood.

## 2025-01-21 ENCOUNTER — TELEPHONE (OUTPATIENT)
Dept: CARDIOLOGY | Facility: CLINIC | Age: 75
End: 2025-01-21
Payer: MEDICARE

## 2025-01-21 DIAGNOSIS — I21.4 NSTEMI, INITIAL EPISODE OF CARE: ICD-10-CM

## 2025-01-21 DIAGNOSIS — I73.9 PAD (PERIPHERAL ARTERY DISEASE): ICD-10-CM

## 2025-01-21 DIAGNOSIS — I25.10 CHRONIC CORONARY ARTERY DISEASE: ICD-10-CM

## 2025-01-21 DIAGNOSIS — I10 PRIMARY HYPERTENSION: ICD-10-CM

## 2025-01-21 DIAGNOSIS — E78.2 MIXED HYPERLIPIDEMIA: ICD-10-CM

## 2025-01-21 RX ORDER — LISINOPRIL 2.5 MG/1
2.5 TABLET ORAL DAILY
Qty: 90 TABLET | Refills: 3 | Status: SHIPPED | OUTPATIENT
Start: 2025-01-21

## 2025-01-21 RX ORDER — ATORVASTATIN CALCIUM 80 MG/1
80 TABLET, FILM COATED ORAL DAILY
Qty: 90 TABLET | Refills: 3 | Status: SHIPPED | OUTPATIENT
Start: 2025-01-21

## 2025-01-21 RX ORDER — ICOSAPENT ETHYL 1 G/1
2 CAPSULE ORAL 2 TIMES DAILY WITH MEALS
Qty: 360 CAPSULE | Refills: 3 | Status: SHIPPED | OUTPATIENT
Start: 2025-01-21

## 2025-01-21 RX ORDER — ATENOLOL 50 MG/1
50 TABLET ORAL DAILY
Qty: 90 TABLET | Refills: 3 | Status: SHIPPED | OUTPATIENT
Start: 2025-01-21

## 2025-01-21 RX ORDER — FUROSEMIDE 40 MG/1
40 TABLET ORAL DAILY
Qty: 90 TABLET | Refills: 3 | Status: SHIPPED | OUTPATIENT
Start: 2025-01-21

## 2025-01-21 RX ORDER — FENOFIBRATE 145 MG/1
145 TABLET, COATED ORAL DAILY
Qty: 90 TABLET | Refills: 3 | Status: SHIPPED | OUTPATIENT
Start: 2025-01-21

## 2025-01-21 RX ORDER — CLOPIDOGREL BISULFATE 75 MG/1
75 TABLET ORAL DAILY
Qty: 90 TABLET | Refills: 3 | Status: SHIPPED | OUTPATIENT
Start: 2025-01-21

## 2025-02-23 NOTE — PROGRESS NOTES
Encounter Date:02/28/2025        Patient ID: Radha Chang is a 74 y.o. female.      Chief Complaint:      History of Present Illness  74 years old woman presented to the hospital with chest pain in May 2023.  She ruled in for non-ST elevation MI followed by a cardiac catheterization which showed critical disease in the RCA which was treated with PCI and drug-eluting stent placement.  LAD was medically managed.  She comes in today and continues to smoke.  She reports compliance with her medications.  Current cardiac medications include aspirin, atenolol, atorvastatin, Plavix, fenofibrate, Lasix and lisinopril    She continues to smoke one third of pack per day.  She complains of cough and shortness of breath which she attributes to COPD.      The following portions of the patient's history were reviewed and updated as appropriate: allergies, current medications, past family history, past medical history, past social history, past surgical history, and problem list.    Review of Systems   Constitutional: Negative for malaise/fatigue.   Cardiovascular:  Negative for chest pain, dyspnea on exertion, leg swelling and palpitations.   Respiratory:  Negative for cough and shortness of breath.    Gastrointestinal:  Negative for abdominal pain, nausea and vomiting.   Neurological:  Negative for dizziness, focal weakness, headaches, light-headedness and numbness.   All other systems reviewed and are negative.      Current Outpatient Medications:     aspirin 81 MG chewable tablet, Chew 1 tablet Daily., Disp: , Rfl:     atenolol (TENORMIN) 50 MG tablet, Take 1 tablet by mouth Daily., Disp: 90 tablet, Rfl: 3    atorvastatin (LIPITOR) 80 MG tablet, Take 1 tablet by mouth Daily., Disp: 90 tablet, Rfl: 3    azithromycin (Zithromax) 250 MG tablet, Take 2 tablets the first day, then 1 tablet daily for 4 days., Disp: 6 tablet, Rfl: 0    clopidogrel (PLAVIX) 75 MG tablet, Take 1 tablet by mouth Daily., Disp: 90 tablet, Rfl: 3     cyclobenzaprine (FLEXERIL) 10 MG tablet, Take 1 tablet by mouth 2 (Two) Times a Day As Needed., Disp: , Rfl:     docusate sodium (COLACE) 50 MG capsule, Take  by mouth As Needed for Constipation., Disp: , Rfl:     fenofibrate (TRICOR) 145 MG tablet, Take 1 tablet by mouth Daily., Disp: 90 tablet, Rfl: 3    furosemide (LASIX) 40 MG tablet, Take 1 tablet by mouth Daily., Disp: 90 tablet, Rfl: 3    gabapentin (NEURONTIN) 600 MG tablet, Take 1 tablet by mouth 3 (Three) Times a Day., Disp: , Rfl:     HYDROcodone-acetaminophen (NORCO)  MG per tablet, Take 1 tablet by mouth Every 4 (Four) Hours As Needed for Moderate Pain., Disp: , Rfl:     icosapent ethyl (VASCEPA) 1 g capsule capsule, Take 2 g by mouth 2 (Two) Times a Day With Meals., Disp: 360 capsule, Rfl: 3    lisinopril (PRINIVIL,ZESTRIL) 2.5 MG tablet, Take 1 tablet by mouth Daily., Disp: 90 tablet, Rfl: 3    methocarbamol (ROBAXIN) 500 MG tablet, Take  by mouth., Disp: , Rfl:     multivitamin with minerals tablet tablet, Take 1 tablet by mouth Daily., Disp: , Rfl:     pantoprazole (PROTONIX) 40 MG EC tablet, Take 1 tablet by mouth Daily., Disp: , Rfl:     Current outpatient and discharge medications have been reconciled for the patient.  Reviewed by: Pedro Avila MD       Allergies   Allergen Reactions    Kefzol [Cefazolin] Itching    Niacin Itching       Family History   Problem Relation Age of Onset    Cancer Mother         pancreatic    Pancreatitis Sister        Past Surgical History:   Procedure Laterality Date    ANGIOPLASTY Right     right leg stent placed    APPENDECTOMY      BACK SURGERY      CARDIAC CATHETERIZATION      CARDIAC CATHETERIZATION Bilateral 5/11/2023    Procedure: Right and Left Heart Cath;  Surgeon: Pedro Avila MD;  Location: [facility] CATH INVASIVE LOCATION;  Service: Cardiovascular;  Laterality: Bilateral;    CARDIAC CATHETERIZATION N/A 5/11/2023    Procedure: Percutaneous Coronary Intervention;  Surgeon: Pedro Avila MD;  Location:  [facility] CATH INVASIVE LOCATION;  Service: Cardiovascular;  Laterality: N/A;    CHOLECYSTECTOMY WITH INTRAOPERATIVE CHOLANGIOGRAM N/A 11/6/2020    Procedure: CHOLECYSTECTOMY LAPAROSCOPIC INTRAOPERATIVE CHOLANGIOGRAM;  Surgeon: Dipesh Rodrigues MD;  Location: [facility] MAIN OR;  Service: General;  Laterality: N/A;  Gallbladder punctured during procedure    HYSTERECTOMY      INTERVENTIONAL RADIOLOGY PROCEDURE N/A 5/11/2023    Procedure: Intravascular Ultrasound;  Surgeon: Pedro Avila MD;  Location: [facility] CATH INVASIVE LOCATION;  Service: Cardiovascular;  Laterality: N/A;    ROTATOR CUFF REPAIR Right        Past Medical History:   Diagnosis Date    Arthritis     Chronic coronary artery disease 5/12/2023    Chronic low back pain 5/12/2023    Chronic narcotic dependence 5/12/2023    Coronary artery disease     GERD (gastroesophageal reflux disease)     GERD without esophagitis 5/12/2023    Hyperlipidemia     Hypertension     Obesity (BMI 30-39.9) 5/12/2023    PAD (peripheral artery disease)     S/P coronary artery stent placement 5/12/2023       Family History   Problem Relation Age of Onset    Cancer Mother         pancreatic    Pancreatitis Sister        Social History     Socioeconomic History    Marital status:    Tobacco Use    Smoking status: Every Day     Current packs/day: 1.00     Types: Cigarettes    Smokeless tobacco: Never   Vaping Use    Vaping status: Never Used   Substance and Sexual Activity    Alcohol use: Not Currently    Drug use: Never    Sexual activity: Defer               Objective:       Physical Exam    LMP  (LMP Unknown)   The patient is alert, oriented and in no distress.    Vital signs as noted above.    Head and neck revealed no carotid bruits or jugular venous distension.  No thyromegaly or lymphadenopathy is present.    Lungs clear.  No wheezing.  Breath sounds are normal bilaterally.    Heart normal first and second heart sounds.  No murmur..  No pericardial rub is present.  No gallop  is present.    Abdomen soft and nontender.  No organomegaly is present.    Extremities revealed good peripheral pulses without any pedal edema.    Skin warm and dry.    Musculoskeletal system is grossly normal.    CNS grossly normal.          No diagnosis found.LAB RESULTS (LAST 7 DAYS)    CBC        BMP        CMP         BNP        TROPONIN        CoAg        Creatinine Clearance  CrCl cannot be calculated (Patient's most recent lab result is older than the maximum 30 days allowed.).    ABG        Radiology  No radiology results for the last day    EKG  Procedures    Stress test      Echocardiogram  Results for orders placed during the hospital encounter of 05/10/23    Adult Transthoracic Echo Complete W/ Cont if Necessary Per Protocol    Interpretation Summary    Left ventricular ejection fraction appears to be 61 - 65%.    Left ventricular diastolic dysfunction is noted.    Estimated right ventricular systolic pressure from tricuspid regurgitation is normal (<35 mmHg).    No significant valvular abnormalities.      Cardiac catheterization  Results for orders placed during the hospital encounter of 05/10/23    Cardiac Catheterization/Vascular Study    Conclusion  OPERATORS  Pedro Avila M.D. (Attending Cardiologist)      PROCEDURE PERFORMED  Ultrasound guided vascular access  Right heart Catheterization  Left Heart Catheterization  Coronary Angiogram 60593  PCI with AIDA placement of culprit vessel RCA 42126  IVUS of RCA 39139  Moderate Sedation 75 minutes    INDICATIONS FOR PROCEDURE  72 years old woman with coronary artery disease, peripheral arterial disease, ongoing smoking presented with acute MI, chest pain and troponin elevation.  She also has pulmonary hypertension.  After discussing the risk and benefit of cardiac catheterization she was brought into the Cath Lab for right and left heart cath.    PROCEDURE IN DETAIL  Informed consent was obtained from the patient after explaining the risks, benefits, and  alternative options of the procedure. After obtaining informed consent, the patient was brought to the cath lab and was prepped in a sterile fashion. Lidocaine 1% was used for local anesthesia into the right femoral venous access site. Right femoral venous vein was accessed using the micropuncture needle under ultrasound guidance and micropuncture wire advanced under flouroscopy. A 7 Ukrainian vascular sheath was put into place percutaneously over guide-wire. Guide wires were removed. A 7Fr swan rosalee catheter was advanced to wedge position. RA, RV and PA and wedge pressures were recorded. PA sat and arterial sats recorded and the swan was subsequently removed in its entirety.  Of note the right common femoral arterial access was also obtained however there is 100% occlusion of iliac artery on the right side. Next, The right radial artery was accessed with an angiocath needle under ultrasound guidance. A 6F slendersheath was inserted successfully.  Afterwards, 6F JR4 diagnostic catheter was advanced over a wire into the ascending aorta and was used to cross the AV and obtain LV pressures.  Gradient across the AV measured via pullback technique.  Next, 6F JL3.5 and JR4 catheters were used to engage the ostia of the left main and RCA respectively. Images of the right and left coronary systems were obtained.    CORONARY ANGIOGRAM FINDINGS:  Dominance: Right    #Left main: Left main is a large vessel Which gives rise to the LAD left circumflex artery.  Left main has diffuse 20 to 30% disease.    #Left Anterior Descending Artery: LAD is a large caliber vessel which gives rise to several septal perforators and several diagonal branches.  Mid LAD has diffuse disease including 70 to 80% stenosis in the midsegment.  First diagonal has proximal 50 to 60% stenosis, second diagonal has ostial 80% stenosis.    #Left Circumflex: The LCx is a small caliber vessel with diffuse nonobstructive disease    #Right Coronary Artery: The RCA  is a large, dominant vessel which gives rise to several small caliber branches and the PDA and PLV.  Mid RCA has 99% thrombotic occlusion.  PARUL-3 flow      [facility] HEMODYNAMICS:  LVp 119/12, 20 mmHg  AOp 110/51, 73 mmHg    No significant gradient across the aortic valve during pullback of JR4 catheter.  LV gram was not performed due to pending echocardiogram    [facility] HEMODYNAMICS:  RA 13/11, 12 mmHg  RV 38/6, 13 mmHg  PA 39/18, 26 mmHg  PCW 24/19, 18 mmHg  AO Sat 93%  PA Sat 63%    Smith CO 5.22 L/min    Smith CI 2.67 L/min/m²    Percutaneous coronary intervention  100 units/kg of heparin was administered and ACT of more than 250 was documented.  A 6 Ethiopian JR4 guide with sideholes was used to engage the ostium of the right coronary artery.  I then advanced a 0.014 run-through wire into the distal RCA.  This was followed by predilation of mid RCA with two 5 x 15 mm compliant balloon.  After this advanced intravascular ultrasound catheter into the distal RCA and slow manual pullback was performed.  This showed mid RCA measurement 3.5 mm and proximal RCA 4 mm.  IVUS also confirmed thrombotic lesion with plaque rupture in the midsegment.  I then placed 2 overlapping stents 3.5 x 33 mm followed by 4 x 38 mm Xience peyman point stent both deployed at 12 jessica.  I then advanced a 4 mm x 15 mm noncompliant balloon and postdilated both stents at 16 jessica.  Final angiography showed 0% stenosis and PARUL-3 flow in the RCA.  Final IVUS run showed complete expansion of the stent and excellent apposition.    The catheters were exchanged over a wire and subsequently removed. The patient tolerated the procedure well without any complications. The pictures were reviewed at the end of the procedure. A TR band was applied.    ESTIMATED BLOOD LOSS:  20 ml    COMPLICATIONS:  None    PROCEDURE DATA:  Sedation Time: 75 minutes    IMPRESSIONS  Two-vessel obstructive coronary disease involving LAD and RCA  IVUS guided PCI with drug-eluting stent  placement of culprit vessel RCA  Mild pulmonary hypertension with elevated LVEDP    RECOMMENDATIONS  -- Dual antiplatelet therapy  -- Needs better blood pressure control  -- Smoking cessation and cardiac rehab  -Medical management of LAD disease.          Assessment and Plan       There are no diagnoses linked to this encounter.     NSTEMI  She has typical chest pain with ECG changes as well as mildly elevated troponin.  Echocardiogram shows EF of 61 to 65%, diastolic dysfunction.  Cardiac catheterization showed critical RCA disease which was treated with PCI and drug-eluting stent placement.  Medical management for LAD disease.  Can add Imdur if needed for chest pain.  Stop aspirin  Continue Plavix, high intensity statin and beta-blocker  Currently chest pain-free.  Will prescribe nitroglycerin as needed     Hypertension  We have started her on ACE inhibitor, beta-blocker  Blood pressure is now well controlled and heart rate   continue Lasix     HFpEF  LVEDP elevated at 20 mmHg.  Continue current doses of Lasix     Hyperlipidemia  , HDL 31, total cholesterol 210, VLDL 72.  Continue atorvastatin and fenofibrate  A1c is 5.3     Peripheral arterial disease  History of stents in bilateral lower extremities.  She will need elective LANDEN  Continue d Plavix and high intensity statin.     Obesity  BMI is 30. she weighs 177 pounds  She has lost 25 pounds  She will benefit from elective screening and treatment of sleep apnea  Diet, exercise, weight loss and lifestyle modification discussed with the patient     Chronic pain  Continue Norco.     GERD  She reports a history of heartburn and acid reflux.  Continue PPI during inpatient stay.     Smoking  Smoking cessation counseling provided to the patient   She is not ready to quit yet.  She certainly has underlying COPD  CT chest to follow-up on nodules.

## 2025-02-28 ENCOUNTER — OFFICE VISIT (OUTPATIENT)
Dept: CARDIOLOGY | Facility: CLINIC | Age: 75
End: 2025-02-28
Payer: MEDICARE

## 2025-02-28 VITALS
SYSTOLIC BLOOD PRESSURE: 133 MMHG | HEART RATE: 61 BPM | WEIGHT: 177.2 LBS | OXYGEN SATURATION: 97 % | BODY MASS INDEX: 30.25 KG/M2 | DIASTOLIC BLOOD PRESSURE: 37 MMHG | HEIGHT: 64 IN

## 2025-02-28 DIAGNOSIS — Z95.5 S/P CORONARY ARTERY STENT PLACEMENT: Chronic | ICD-10-CM

## 2025-02-28 DIAGNOSIS — I25.10 CHRONIC CORONARY ARTERY DISEASE: ICD-10-CM

## 2025-02-28 DIAGNOSIS — I10 PRIMARY HYPERTENSION: ICD-10-CM

## 2025-02-28 DIAGNOSIS — I70.211 ATHEROSCLEROSIS OF NATIVE ARTERIES OF EXTREMITIES WITH INTERMITTENT CLAUDICATION, RIGHT LEG: ICD-10-CM

## 2025-02-28 DIAGNOSIS — E66.9 OBESITY (BMI 30-39.9): Chronic | ICD-10-CM

## 2025-02-28 DIAGNOSIS — I73.9 PAD (PERIPHERAL ARTERY DISEASE): ICD-10-CM

## 2025-02-28 DIAGNOSIS — E78.2 MIXED HYPERLIPIDEMIA: ICD-10-CM

## 2025-02-28 DIAGNOSIS — I21.4 NSTEMI, INITIAL EPISODE OF CARE: Primary | ICD-10-CM

## 2025-02-28 DIAGNOSIS — K21.9 CHRONIC GERD: ICD-10-CM

## 2025-02-28 DIAGNOSIS — F11.20 CHRONIC NARCOTIC DEPENDENCE: ICD-10-CM

## 2025-02-28 DIAGNOSIS — E66.811 CLASS 1 OBESITY DUE TO EXCESS CALORIES WITHOUT SERIOUS COMORBIDITY WITH BODY MASS INDEX (BMI) OF 33.0 TO 33.9 IN ADULT: ICD-10-CM

## 2025-02-28 DIAGNOSIS — E66.09 CLASS 1 OBESITY DUE TO EXCESS CALORIES WITHOUT SERIOUS COMORBIDITY WITH BODY MASS INDEX (BMI) OF 33.0 TO 33.9 IN ADULT: ICD-10-CM

## 2025-02-28 RX ORDER — NITROGLYCERIN 0.4 MG/1
TABLET SUBLINGUAL
Qty: 100 TABLET | Refills: 0 | Status: SHIPPED | OUTPATIENT
Start: 2025-02-28

## 2025-03-24 DIAGNOSIS — E78.2 MIXED HYPERLIPIDEMIA: Primary | ICD-10-CM

## 2025-03-24 RX ORDER — ICOSAPENT ETHYL 1 G/1
2 CAPSULE ORAL 2 TIMES DAILY WITH MEALS
Qty: 360 CAPSULE | Refills: 3 | Status: SHIPPED | OUTPATIENT
Start: 2025-03-24 | End: 2025-03-25 | Stop reason: SDUPTHER

## 2025-03-24 NOTE — TELEPHONE ENCOUNTER
Rx Refill Note  Requested Prescriptions     Pending Prescriptions Disp Refills    icosapent ethyl (VASCEPA) 1 g capsule capsule 360 capsule 3     Sig: Take 2 g by mouth 2 (Two) Times a Day With Meals.      Last office visit with prescribing clinician: 2/28/2025   Last telemedicine visit with prescribing clinician: Visit date not found   Next office visit with prescribing clinician: 12/5/2025                         Would you like a call back once the refill request has been completed: [] Yes [] No    If the office needs to give you a call back, can they leave a voicemail: [] Yes [] No    Kalie Rosas MA  03/24/25, 11:05 EDT

## 2025-03-25 DIAGNOSIS — E78.2 MIXED HYPERLIPIDEMIA: ICD-10-CM

## 2025-03-25 RX ORDER — ICOSAPENT ETHYL 1 G/1
2 CAPSULE ORAL 2 TIMES DAILY WITH MEALS
Qty: 360 CAPSULE | Refills: 3 | Status: SHIPPED | OUTPATIENT
Start: 2025-03-25

## 2025-03-25 NOTE — TELEPHONE ENCOUNTER
Rx Refill Note  Requested Prescriptions     Pending Prescriptions Disp Refills    icosapent ethyl (VASCEPA) 1 g capsule capsule 360 capsule 3     Sig: Take 2 g by mouth 2 (Two) Times a Day With Meals.      Last office visit with prescribing clinician: 2/28/2025   Last telemedicine visit with prescribing clinician: Visit date not found   Next office visit with prescribing clinician: 12/5/2025                         Would you like a call back once the refill request has been completed: [] Yes [] No    If the office needs to give you a call back, can they leave a voicemail: [] Yes [] No    Linsey Henning MA  03/25/25, 16:28 EDT

## (undated) DEVICE — DECANTER: Brand: UNBRANDED

## (undated) DEVICE — CATH DIAG IMPULSE FL3.5 6F 100CM

## (undated) DEVICE — TBG LAP CONN MEGADYNE EVAC SMOKE STRL

## (undated) DEVICE — TBG NAMIC PRESS MONTR A/ F/M 12IN

## (undated) DEVICE — UNDERGLV SURG BIOGEL INDICATOR LTX PF 7

## (undated) DEVICE — SKIN AFFIX SURG ADHESIVE 72/CS 0.55ML: Brand: MEDLINE

## (undated) DEVICE — GW EMR FIX EXCHG J STD .035 3MM 260CM

## (undated) DEVICE — SYR LL TP 10ML STRL

## (undated) DEVICE — SWAN-GANZ THERMODILUTION CATHETER: Brand: SWAN-GANZ

## (undated) DEVICE — ENDOPATH XCEL WITH OPTIVIEW TECHNOLOGY BLADELESS TROCARS WITH STABILITY SLEEVES: Brand: ENDOPATH XCEL OPTIVIEW

## (undated) DEVICE — GLV SURG BIOGEL LTX PF 7

## (undated) DEVICE — NC TREK NEO™ CORONARY DILATATION CATHETER 4.00 MM X 15 MM / RAPID-EXCHANGE: Brand: NC TREK NEO™

## (undated) DEVICE — CATH DIAG IMPULSE PIG .056 6F 110CM

## (undated) DEVICE — CVR PROB ULTRASND CIVFLEX GEN/PURP TELESCP/FOLD 5.5X24IN LF

## (undated) DEVICE — GLIDESHEATH SLENDER ACCESS KIT: Brand: GLIDESHEATH SLENDER

## (undated) DEVICE — KT PK ANGIOPLASTY ACC 9 MERIT

## (undated) DEVICE — CORONARY IMAGING CATHETER: Brand: OPTICROSS 6

## (undated) DEVICE — ENDOPATH 5MM CURVED SCISSORS WITH MONOPOLAR CAUTERY: Brand: ENDOPATH

## (undated) DEVICE — CUFF SCD HEMOFORCE SEQ CALF STD MD

## (undated) DEVICE — PK TRY HEART CATH 50

## (undated) DEVICE — MYNXGRIP 6F/7F: Brand: MYNXGRIP

## (undated) DEVICE — RETRV BG SPECI GENISTRONG MD 240ML

## (undated) DEVICE — 2, DISPOSABLE SUCTION/IRRIGATOR WITH DISPOSABLE TIP: Brand: STRYKEFLOW

## (undated) DEVICE — ENDOPATH XCEL BLUNT TIP TROCARS WITH SMOOTH SLEEVES: Brand: ENDOPATH XCEL

## (undated) DEVICE — SOL NACL 0.9PCT 1000ML

## (undated) DEVICE — PINNACLE INTRODUCER SHEATH: Brand: PINNACLE

## (undated) DEVICE — KT SURG TURNOVER 050

## (undated) DEVICE — SYR LUERLOK 20CC BX/50

## (undated) DEVICE — BOWL PLSTC MD 16OZ BLU STRL

## (undated) DEVICE — CATH DIAG IMPULSE FL4 6F 100CM

## (undated) DEVICE — DEV INFL COMPAK W/ACCESSPLUS IN4530

## (undated) DEVICE — LAPAROSCOPIC GAS CONDITIONING DEVICE.: Brand: INSUFLOW

## (undated) DEVICE — ST ACC MICROPUNCTURE STFF/CANN PLAT/TP 4F 21G 40CM

## (undated) DEVICE — ELECTRD DEFIB M/FUNC PROPADZ RADIOL 2PK

## (undated) DEVICE — CATH DIAG IMPULSE FR4 6F 100CM

## (undated) DEVICE — TREK CORONARY DILATATION CATHETER 2.50 MM X 15 MM / RAPID-EXCHANGE: Brand: TREK

## (undated) DEVICE — MEDICINE CUP, GRADUATED, STER: Brand: MEDLINE

## (undated) DEVICE — SOL IRRIG NACL 9PCT 1000ML BTL

## (undated) DEVICE — SOL IRR H2O BTL 1000ML STRL

## (undated) DEVICE — INTENDED FOR TISSUE SEPARATION, AND OTHER PROCEDURES THAT REQUIRE A SHARP SURGICAL BLADE TO PUNCTURE OR CUT.: Brand: BARD-PARKER ® CARBON RIB-BACK BLADES

## (undated) DEVICE — SUT VIC 0/0 UR6 27IN DYED J603H

## (undated) DEVICE — GW RUNTHROUGH NS HYPERCOAT .014 3X180CM

## (undated) DEVICE — GENERAL LAPAROSCOPY CDS: Brand: MEDLINE INDUSTRIES, INC.

## (undated) DEVICE — THE KUMAR CATHETER®, USED IN CONJUNCTION WITH KUMAR CHOLANGIOGRAPHY® CLAMP, IS MEANT TO PROVIDE A MEANS OF LAPAROSCOPIC CHOLANGIOGRAPHY. IT COMPRISES A TRANSLUCENT TUBING ( 76 CM. LENGTH AND 16 GA. ) THAT CARRIES A 19 GA., 1.25 CM LONG NEEDLE AT THE END. THE KUMAR CATHETER® IS USED TO PUNCTURE THE HARTMANN'S POUCH OF THE GALLBLADDER FOR BILIARY ACCESS AND / OR ASPIRATION. PRODUCT IS LATEX FREE.: Brand: KUMAR CATHETER®

## (undated) DEVICE — PRESSURE MONITORING ACCESSORY: Brand: TRUWAVE

## (undated) DEVICE — 3M™ WARMING BLANKET, UPPER BODY, 10 PER CASE, 42268: Brand: BAIR HUGGER™

## (undated) DEVICE — APPL HEMO SURG ARISTA/AH/FLEXITIP XLR 38CM

## (undated) DEVICE — DEV COND GAS LAP INSUFLOW W/LUER CONN

## (undated) DEVICE — ENDOPATH XCEL WITH OPTIVIEW TECHNOLOGY UNIVERSAL TROCAR STABILITY SLEEVES: Brand: ENDOPATH XCEL OPTIVIEW

## (undated) DEVICE — GUIDE CATHETER: Brand: MACH1™

## (undated) DEVICE — CONTRST ISOVUE300 61PCT 50ML

## (undated) DEVICE — UNDYED BRAIDED (POLYGLACTIN 910), SYNTHETIC ABSORBABLE SUTURE: Brand: COATED VICRYL